# Patient Record
Sex: FEMALE | Race: WHITE | NOT HISPANIC OR LATINO | Employment: UNEMPLOYED | URBAN - METROPOLITAN AREA
[De-identification: names, ages, dates, MRNs, and addresses within clinical notes are randomized per-mention and may not be internally consistent; named-entity substitution may affect disease eponyms.]

---

## 2017-06-27 ENCOUNTER — ALLSCRIPTS OFFICE VISIT (OUTPATIENT)
Dept: OTHER | Facility: OTHER | Age: 32
End: 2017-06-27

## 2017-10-17 ENCOUNTER — TRANSCRIBE ORDERS (OUTPATIENT)
Dept: ADMINISTRATIVE | Facility: HOSPITAL | Age: 32
End: 2017-10-17

## 2017-10-17 ENCOUNTER — ALLSCRIPTS OFFICE VISIT (OUTPATIENT)
Dept: OTHER | Facility: OTHER | Age: 32
End: 2017-10-17

## 2017-10-17 ENCOUNTER — APPOINTMENT (OUTPATIENT)
Dept: LAB | Facility: MEDICAL CENTER | Age: 32
End: 2017-10-17
Payer: COMMERCIAL

## 2017-10-17 DIAGNOSIS — R50.9 FEVER: ICD-10-CM

## 2017-10-17 DIAGNOSIS — F41.9 ANXIETY DISORDER: ICD-10-CM

## 2017-10-17 DIAGNOSIS — R00.2 PALPITATIONS: ICD-10-CM

## 2017-10-17 DIAGNOSIS — R53.83 OTHER FATIGUE: ICD-10-CM

## 2017-10-17 LAB
ALBUMIN SERPL BCP-MCNC: 4.3 G/DL (ref 3.5–5)
ALP SERPL-CCNC: 50 U/L (ref 46–116)
ALT SERPL W P-5'-P-CCNC: 33 U/L (ref 12–78)
ANION GAP SERPL CALCULATED.3IONS-SCNC: 10 MMOL/L (ref 4–13)
AST SERPL W P-5'-P-CCNC: 31 U/L (ref 5–45)
BASOPHILS # BLD AUTO: 0.03 THOUSANDS/ΜL (ref 0–0.1)
BASOPHILS NFR BLD AUTO: 1 % (ref 0–1)
BILIRUB SERPL-MCNC: 0.83 MG/DL (ref 0.2–1)
BILIRUB UR QL STRIP: NEGATIVE
BUN SERPL-MCNC: 13 MG/DL (ref 5–25)
CALCIUM SERPL-MCNC: 9 MG/DL (ref 8.3–10.1)
CHLORIDE SERPL-SCNC: 103 MMOL/L (ref 100–108)
CLARITY UR: CLEAR
CO2 SERPL-SCNC: 26 MMOL/L (ref 21–32)
COLOR UR: YELLOW
CREAT SERPL-MCNC: 0.76 MG/DL (ref 0.6–1.3)
EOSINOPHIL # BLD AUTO: 0.3 THOUSAND/ΜL (ref 0–0.61)
EOSINOPHIL NFR BLD AUTO: 5 % (ref 0–6)
ERYTHROCYTE [DISTWIDTH] IN BLOOD BY AUTOMATED COUNT: 12.5 % (ref 11.6–15.1)
GFR SERPL CREATININE-BSD FRML MDRD: 103 ML/MIN/1.73SQ M
GLUCOSE SERPL-MCNC: 81 MG/DL (ref 65–140)
GLUCOSE UR STRIP-MCNC: NEGATIVE MG/DL
HCT VFR BLD AUTO: 43.7 % (ref 34.8–46.1)
HGB BLD-MCNC: 14.7 G/DL (ref 11.5–15.4)
HGB UR QL STRIP.AUTO: NEGATIVE
KETONES UR STRIP-MCNC: ABNORMAL MG/DL
LEUKOCYTE ESTERASE UR QL STRIP: NEGATIVE
LYMPHOCYTES # BLD AUTO: 2.02 THOUSANDS/ΜL (ref 0.6–4.47)
LYMPHOCYTES NFR BLD AUTO: 35 % (ref 14–44)
MAGNESIUM SERPL-MCNC: 2.3 MG/DL (ref 1.6–2.6)
MCH RBC QN AUTO: 31.2 PG (ref 26.8–34.3)
MCHC RBC AUTO-ENTMCNC: 33.6 G/DL (ref 31.4–37.4)
MCV RBC AUTO: 93 FL (ref 82–98)
MONOCYTES # BLD AUTO: 0.52 THOUSAND/ΜL (ref 0.17–1.22)
MONOCYTES NFR BLD AUTO: 9 % (ref 4–12)
NEUTROPHILS # BLD AUTO: 2.89 THOUSANDS/ΜL (ref 1.85–7.62)
NEUTS SEG NFR BLD AUTO: 50 % (ref 43–75)
NITRITE UR QL STRIP: NEGATIVE
NRBC BLD AUTO-RTO: 0 /100 WBCS
PH UR STRIP.AUTO: 6 [PH] (ref 4.5–8)
PLATELET # BLD AUTO: 185 THOUSANDS/UL (ref 149–390)
PMV BLD AUTO: 10.4 FL (ref 8.9–12.7)
POTASSIUM SERPL-SCNC: 4.3 MMOL/L (ref 3.5–5.3)
PROT SERPL-MCNC: 7.9 G/DL (ref 6.4–8.2)
PROT UR STRIP-MCNC: NEGATIVE MG/DL
RBC # BLD AUTO: 4.71 MILLION/UL (ref 3.81–5.12)
SODIUM SERPL-SCNC: 139 MMOL/L (ref 136–145)
SP GR UR STRIP.AUTO: 1.01 (ref 1–1.03)
TSH SERPL DL<=0.05 MIU/L-ACNC: 2.21 UIU/ML (ref 0.36–3.74)
UROBILINOGEN UR QL STRIP.AUTO: 0.2 E.U./DL
WBC # BLD AUTO: 5.78 THOUSAND/UL (ref 4.31–10.16)

## 2017-10-17 PROCEDURE — 83735 ASSAY OF MAGNESIUM: CPT

## 2017-10-17 PROCEDURE — 36415 COLL VENOUS BLD VENIPUNCTURE: CPT

## 2017-10-17 PROCEDURE — 86618 LYME DISEASE ANTIBODY: CPT

## 2017-10-17 PROCEDURE — 84443 ASSAY THYROID STIM HORMONE: CPT

## 2017-10-17 PROCEDURE — 80053 COMPREHEN METABOLIC PANEL: CPT

## 2017-10-17 PROCEDURE — 81003 URINALYSIS AUTO W/O SCOPE: CPT

## 2017-10-17 PROCEDURE — 85025 COMPLETE CBC W/AUTO DIFF WBC: CPT

## 2017-10-18 ENCOUNTER — TRANSCRIBE ORDERS (OUTPATIENT)
Dept: ADMINISTRATIVE | Facility: HOSPITAL | Age: 32
End: 2017-10-18

## 2017-10-18 DIAGNOSIS — R00.2 PALPITATIONS: Primary | ICD-10-CM

## 2017-10-18 NOTE — PROGRESS NOTES
Assessment  1  Heart palpitations (785 1) (R00 2)   2  Fever (780 60) (R50 9)   3  Fatigue (780 79) (R53 83)   4  Anxiety disorder (300 00) (F41 9)   5  Asthma (493 90) (J45 909)   6  Change in hearing of left ear (389 9) (H91 92)   7  Former smoker (W55 61) (N07 150)    Plan  Anxiety disorder, Fatigue, Fever, Heart palpitations    · (1) CBC/PLT/DIFF; Status:Active; Requested for:17Oct2017;    · (1) COMPREHENSIVE METABOLIC PANEL; Status:Active; Requested for:17Oct2017;    · (1) LYME ANTIBODY PROFILE W/REFLEX TO WESTERN BLOT; Status:Active; Requested for:17Oct2017;    · (1) MAGNESIUM; Status:Active; Requested for:17Oct2017;    · (1) TSH; Status:Active; Requested for:17Oct2017;   Depression with anxiety    · *VB-Depression Screening; Status:Complete - Retrospective By Protocol Authorization;    Done: 73VPD0244 03:40PM  Fever    · (1) URINALYSIS w URINE C/S REFLEX (will reflex a microscopy if leukocytes, occult  blood, or nitrites are not within normal limits); Status:Active; Requested for:17Oct2017;    · (1) URINE CULTURE; Source:Urine, Clean Catch; Status:Active; Requested  IPA:[id number]; Heart palpitations    · ECHO COMPLETE WITH CONTRAST IF INDICATED; Status:Need Information -  Financial Authorization; Requested HTK:[id number];    · EKG/ECG- POC; Status:Complete - Retrospective By Protocol Authorization;   Done:  86DTD7726 04:13PM    Discussion/Summary    1 : Palpitations: On exam today, there was no abnormalities noted  This was a historic issue, the patient did mention that her mother had MVP as well  Based on this, check echo  This may also find vegetations for the fever of unknown origin, so be entirely reasonable to continue on this course of diagnostic imaging  Fever: Patient does have a fever of unknown origin at the moment  Not sure why she is having this  Check Lyme, CBC, CMP, TSH, urinalysis  Follow-up in the future  Fatigue: Again, unsure as to cause  Check labs  Follow up after that   Anxiety: Patient does have a significant amount of anxiety at the moment, but it is likely adjustment reaction rather than true anxiety disorder  For the moment, she seems to be tolerating things relatively well, and did not wish to start medication  I would concur, and we will re-evaluate in the future  Asthma: Patient has intermittent asthma  Currently, she has not needed the Proventil in quite some time  No changes with this  Flu shot would be recommended  This is something that she can schedule in the future, as I would not wish to give her any vaccinations during a workup for fever of unknown origin  Decreased hearing on the left: Patient does not have any abnormalities in the ear canal  Will check tympanogram, but after this consider seeing audiology  Possible side effects of new medications were reviewed with the patient/guardian today  The treatment plan was reviewed with the patient/guardian  The patient/guardian understands and agrees with the treatment plan      Chief Complaint  Feeling run down lately, running low grade fever over past few days  Feels weak and shaky  Under a lot stress due to a recent unexpected death in the family  Started smoking after that but has since quit    Experiencing a lot of anxiety  positive for moderated depression  History of Present Illness  HPI: Fevers, chills, shakes at times  No SOB  No urine problems  No face tooth pain  No rashes  inconsistancies in bowels  Not regular: Loose at times, and frequent at times  There was only 1 day of constipation recently  has had significant stress due to the passing of her brother in law  She has been to a grief counselor  was 6 weeks ago  Review of Systems    Constitutional: No fever, no chills, feels well, no tiredness, no recent weight gain or loss  ENT: no ear ache, no loss of hearing, no nosebleeds or nasal discharge, no sore throat or hoarseness  Cardiovascular: Has had some symptoms in the last 6 weeks  Is arround anxiety   Has had some palpitations as well  Respiratory: no complaints of shortness of breath, no wheezing, no dyspnea on exertion, no orthopnea or PND  Active Problems  1  Allergic rhinitis (477 9) (J30 9)   2  Anxiety disorder (300 00) (F41 9)   3  Asthma (493 90) (J45 909)   4  Need for Tdap vaccination (V06 1) (Z23)    Past Medical History  1  Acute sinusitis (461 9) (J01 90)   2  History of Acute upper respiratory infection (465 9) (J06 9)   3  History of Acute viral pharyngitis (462) (J02 8,B97 89)   4  History of Fever and chills (780 60) (R50 9)   5  History of Flank pain (789 09) (R10 9)   6  History of acute bronchitis (V12 69) (Z87 09)   7  History of acute pharyngitis (V12 69) (Z87 09)   8  History of back pain (V13 59) (Z87 39)   9  History of cough   10  History of eating disorder (V11 8) (Z86 59)   11  History of headache (V13 89) (Z87 898)   12  History of pyelonephritis (V13 02) (Z87 448)   13  History of sinusitis (V12 69) (Z87 09)   14  History of sore throat (V12 60) (Z87 09)   15  History of urinary tract infection (V13 02) (Z87 440)   16  History of Puncture wound of foot (892 0) (L28 115V)  Active Problems And Past Medical History Reviewed: The active problems and past medical history were reviewed and updated today  Family History  Mother    1  Family history of mitral valve prolapse (V17 49) (Z82 49)  Father    2  Family history of Asthma (V17 5)   3  Family history of Hyperlipidemia   4  Family history of Hypertension (V17 49)  Paternal Grandfather    11  Family history of Prostate Cancer (B47 50)  Family History Reviewed: The family history was reviewed and updated today  Social History   · Former smoker (D72 07) (A71 166)   · No secondhand smoke exposure (V49 89) (Z78 9)  The social history was reviewed and updated today  The social history was reviewed and is unchanged  Surgical History  Surgical History Reviewed: The surgical history was reviewed and updated today  Current Meds   1  Azelastine HCl - 0 1 % Nasal Solution; INSTILL 2 SPRAYS IN EACH NOSTRIL ONCE   DAILY; Therapy: 69XPO2846 to (Last Rx:27Jun2017) Ordered   2  PriLOSEC OTC 20 MG Oral Tablet Delayed Release; Therapy: 59LHD4539 to Recorded   3  ProAir  (90 Base) MCG/ACT Inhalation Aerosol Solution; Therapy: (Recorded:17Oct2017) to Recorded   4  Sprintec 28 0 25-35 MG-MCG Oral Tablet; Therapy: 02XWB9686 to Recorded    The medication list was reviewed and updated today  Allergies  1  Percocet TABS    Vitals   Recorded: 19CDO2859 03:31PM   Temperature 99 3 F, Tympanic   Heart Rate 80, L Radial   Pulse Quality Regular, L Radial   Systolic 999, LUE, Sitting   Diastolic 90, LUE, Sitting   Height 5 ft 8 in   Weight 135 lb 3 2 oz   BMI Calculated 20 56   BSA Calculated 1 73     Physical Exam    Constitutional   General appearance: No acute distress, well appearing and well nourished  Pulmonary   Respiratory effort: No increased work of breathing or signs of respiratory distress  Auscultation of lungs: Clear to auscultation  Cardiovascular   Auscultation of heart: Normal rate and rhythm, normal S1 and S2, without murmurs  Carotid pulses: Normal     Abdomen   Abdomen: Non-tender, no masses  Liver and spleen: No hepatomegaly or splenomegaly  Musculoskeletal   Gait and station: Normal     Digits and nails: Normal without clubbing or cyanosis  Inspection/palpation of joints, bones, and muscles: Normal     Neurologic   Cranial nerves: Cranial nerves 2-12 intact  Reflexes: 2+ and symmetric  Sensation: No sensory loss      Psychiatric   Orientation to person, place, and time: Normal     Mood and affect: Normal          Results/Data  EKG/ECG- POC 91UPY3314 04:13PM [facility]     Test Name Result Flag Reference   EKG/ECG see scanned report       *VB-Depression Screening 90GAW6392 03:40PM [facility]     Test Name Result Flag Reference   Depression Scale Result      Depression Screen - Positive Findings     PHQ-9 Adult Depression Screening 24KEP0685 03:39PM User, Mike     Test Name Result Flag Reference   PHQ-9 Adult Depression Score 13     Over the last two weeks, how often have you been bothered by any of the following problems? Little interest or pleasure in doing things: More than half the days - 2  Feeling down, depressed, or hopeless: More than half the days - 2  Trouble falling or staying asleep, or sleeping too much: More than half the days - 2  Feeling tired or having little energy: Nearly every day - 3  Poor appetite or over eating: Nearly every day - 3  Feeling bad about yourself - or that you are a failure or have let yourself or your family down: Not at all - 0  Trouble concentrating on things, such as reading the newspaper or watching television: Several days - 1  Moving or speaking so slowly that other people could have noticed  Or the opposite -  being so fidgety or restless that you have been moving around a lot more than usual: Not at all - 0  Thoughts that you would be better off dead, or of hurting yourself in some way: Not at all - 0   PHQ-9 Adult Depression Screening Positive     PHQ-9 Difficulty Level Somewhat difficult     PHQ-9 Severity Moderate Depression         Signatures   Electronically signed by :  TAB Gill ; Oct 17 2017  4:28PM EST                       (Author)

## 2017-10-19 ENCOUNTER — GENERIC CONVERSION - ENCOUNTER (OUTPATIENT)
Dept: OTHER | Facility: OTHER | Age: 32
End: 2017-10-19

## 2017-10-19 LAB
B BURGDOR IGG SER IA-ACNC: 0.11
B BURGDOR IGM SER IA-ACNC: 0.21

## 2017-10-25 ENCOUNTER — HOSPITAL ENCOUNTER (OUTPATIENT)
Dept: NON INVASIVE DIAGNOSTICS | Facility: CLINIC | Age: 32
Discharge: HOME | End: 2017-10-25
Payer: COMMERCIAL

## 2017-10-25 ENCOUNTER — GENERIC CONVERSION - ENCOUNTER (OUTPATIENT)
Dept: OTHER | Facility: OTHER | Age: 32
End: 2017-10-25

## 2017-10-25 DIAGNOSIS — R00.2 PALPITATIONS: ICD-10-CM

## 2017-10-25 PROCEDURE — 93306 TTE W/DOPPLER COMPLETE: CPT

## 2017-10-30 ENCOUNTER — GENERIC CONVERSION - ENCOUNTER (OUTPATIENT)
Dept: OTHER | Facility: OTHER | Age: 32
End: 2017-10-30

## 2017-10-30 ENCOUNTER — GENERIC CONVERSION - ENCOUNTER (OUTPATIENT)
Dept: FAMILY MEDICINE CLINIC | Facility: CLINIC | Age: 32
End: 2017-10-30

## 2017-12-11 ENCOUNTER — GENERIC CONVERSION - ENCOUNTER (OUTPATIENT)
Dept: OTHER | Facility: OTHER | Age: 32
End: 2017-12-11

## 2018-01-08 DIAGNOSIS — M54.50 LOW BACK PAIN: ICD-10-CM

## 2018-01-09 NOTE — RESULT NOTES
Verified Results  (1) CBC/PLT/DIFF 72FRK2486 05:02PM Amos Gonzalez Order Number: [id number]     Test Name Result Flag Reference   WBC COUNT 5 78 Thousand/uL  4 31-10 16   RBC COUNT 4 71 Million/uL  3 81-5 12   HEMOGLOBIN 14 7 g/dL  11 5-15 4   HEMATOCRIT 43 7 %  34 8-46  1   MCV 93 fL  82-98   MCH 31 2 pg  26 8-34 3   MCHC 33 6 g/dL  31 4-37 4   RDW 12 5 %  11 6-15 1   MPV 10 4 fL  8 9-12 7   PLATELET COUNT 896 Thousands/uL  149-390   nRBC AUTOMATED 0 /100 WBCs     NEUTROPHILS RELATIVE PERCENT 50 %  43-75   LYMPHOCYTES RELATIVE PERCENT 35 %  14-44   MONOCYTES RELATIVE PERCENT 9 %  4-12   EOSINOPHILS RELATIVE PERCENT 5 %  0-6   BASOPHILS RELATIVE PERCENT 1 %  0-1   NEUTROPHILS ABSOLUTE COUNT 2 89 Thousands/? ??L  1 85-7 62   LYMPHOCYTES ABSOLUTE COUNT 2 02 Thousands/? ??L  0 60-4 47   MONOCYTES ABSOLUTE COUNT 0 52 Thousand/? ??L  0 17-1 22   EOSINOPHILS ABSOLUTE COUNT 0 30 Thousand/? ??L  0 00-0 61   BASOPHILS ABSOLUTE COUNT 0 03 Thousands/? ??L  0 00-0 10     (1) COMPREHENSIVE METABOLIC PANEL 02BXK9593 67:46TP Tori Hunt    Order Number: [id number]     Test Name Result Flag Reference   GLUCOSE,RANDM 81 mg/dL     If the patient is fasting, the ADA then defines impaired fasting glucose as > 100 mg/dL and diabetes as > or equal to 123 mg/dL  Specimen collection should occur prior to Sulfasalazine administration due to the potential for falsely depressed results  Specimen collection should occur prior to Sulfapyridine administration due to the potential for falsely elevated results     SODIUM 139 mmol/L  136-145   POTASSIUM 4 3 mmol/L  3 5-5 3   CHLORIDE 103 mmol/L  100-108   CARBON DIOXIDE 26 mmol/L  21-32   ANION GAP (CALC) 10 mmol/L  4-13   BLOOD UREA NITROGEN 13 mg/dL  5-25   CREATININE 0 76 mg/dL  0 60-1 30   Standardized to IDMS reference method   CALCIUM 9 0 mg/dL  8 3-10 1   BILI, TOTAL 0 83 mg/dL  0 20-1 00   ALK PHOSPHATAS 50 U/L     ALT (SGPT) 33 U/L  12-78   Specimen collection should occur prior to Sulfasalazine and/or Sulfapyridine administration due to the potential for falsely depressed results  AST(SGOT) 31 U/L  5-45   Specimen collection should occur prior to Sulfasalazine administration due to the potential for falsely depressed results  ALBUMIN 4 3 g/dL  3 5-5 0   TOTAL PROTEIN 7 9 g/dL  6 4-8 2   eGFR 103 ml/min/1 73sq m     [facility] Disease Education Program recommendations are as follows:  GFR calculation is accurate only with a steady state creatinine  Chronic Kidney disease less than 60 ml/min/1 73 sq  meters  Kidney failure less than 15 ml/min/1 73 sq  meters  (1) TSH 17Oct2017 05:02PM Davina Led   TW Order Number: [id number]     Test Name Result Flag Reference   TSH 2 210 uIU/mL  0 358-3 740   Patients undergoing fluorescein dye angiography may retain small amounts of fluorescein in the body for 48-72 hours post procedure  Samples containing fluorescein can produce falsely depressed TSH values  If the patient had this procedure,a specimen should be resubmitted post fluorescein clearance            The recommended reference ranges for TSH during pregnancy are as follows:  First trimester 0 1 to 2 5 uIU/mL  Second trimester  0 2 to 3 0 uIU/mL  Third trimester 0 3 to 3 0 uIU/m     (1) MAGNESIUM 17Oct2017 05:02PM Davina Led   TW Order Number: [id number]     Test Name Result Flag Reference   MAGNESIUM 2 3 mg/dL  1 6-2 6     (1) URINALYSIS w URINE C/S REFLEX (will reflex a microscopy if leukocytes, occult blood, or nitrites are not within normal limits) 53FXC1071 05:02PM Cosme Semen Order Number: [id number]     Test Name Result Flag Reference   COLOR Yellow     CLARITY Clear     PH UA 6 0  4 5-8 0   LEUKOCYTE ESTERASE UA Negative  Negative   NITRITE UA Negative  Negative   PROTEIN UA Negative mg/dl  Negative   GLUCOSE UA Negative mg/dl  Negative   KETONES UA Trace mg/dl A Negative   UROBILINOGEN UA 0 2 E U /dl 0  2, 1 0 E U /dl   BILIRUBIN UA Negative  Negative   BLOOD UA Negative  Negative   SPECIFIC GRAVITY UA 1 008  1 003-1 030       Plan  Anxiety disorder, Fatigue, Fever, Heart palpitations    · (1) CBC/PLT/DIFF; Status:Complete;   Done: 34MGO5767 05:02PM   · (1) COMPREHENSIVE METABOLIC PANEL; Status:Complete;   Done: 29SJQ1117  05:02PM   · (1) LYME ANTIBODY PROFILE W/REFLEX TO WESTERN BLOT; Status:Active; Requested for:17Oct2017;    · (1) MAGNESIUM; Status:Complete;   Done: 22ORL7288 05:02PM   · (1) TSH; Status:Complete;   Done: 91VOD8513 05:02PM  Depression with anxiety    · *VB-Depression Screening; Status:Complete;   Done: 88FGR7416 03:40PM  Fever    · (1) URINALYSIS w URINE C/S REFLEX (will reflex a microscopy if leukocytes, occult  blood, or nitrites are not within normal limits); Status:Complete;   Done: 85RBL6809  05:02PM   · (1) URINE CULTURE; Source:Urine, Clean Catch; Status:Active; Requested  SFJ:[id number]; Heart palpitations    · ECHO COMPLETE WITH CONTRAST IF INDICATED; Status:Need Information -  Financial Authorization;  Requested OEM:[id number];    · EKG/ECG- POC; Status:Complete;   Done: 66FFP0928 04:13PM

## 2018-01-13 VITALS
DIASTOLIC BLOOD PRESSURE: 90 MMHG | TEMPERATURE: 99.3 F | BODY MASS INDEX: 20.49 KG/M2 | HEART RATE: 80 BPM | HEIGHT: 68 IN | SYSTOLIC BLOOD PRESSURE: 136 MMHG | WEIGHT: 135.2 LBS

## 2018-01-13 VITALS
HEART RATE: 72 BPM | SYSTOLIC BLOOD PRESSURE: 108 MMHG | BODY MASS INDEX: 21.28 KG/M2 | DIASTOLIC BLOOD PRESSURE: 60 MMHG | WEIGHT: 140.38 LBS | HEIGHT: 68 IN

## 2018-01-13 NOTE — MISCELLANEOUS
Message  Pt called c/o sore throat and loss of voice  She was told to follow up in 5 days if not better  She states her fever has resolved and her SOB is no longer a issue  Per Brett Spangler and DM note pt may have Z-Eddie and start OTC Flonase as well  Pt agrees and RX sent to [phone]  kw      Active Problems    1  Acute viral pharyngitis (462) (J02 8,B97 89)   2  Allergic rhinitis (477 9) (J30 9)   3  Anxiety disorder (300 00) (F41 9)   4  Asthma (493 90) (J45 909)   5  Back pain (724 5) (M54 9)   6  Cough (786 2) (R05)   7  Depression with anxiety (300 4) (F41 8)   8  Headache (784 0) (R51)   9  Personality disorder (301 9) (F60 9)   10  Sore throat (462) (J02 9)    Current Meds   1  Benzonatate 200 MG Oral Capsule; Take one three times a day as needed for cough; Therapy: 20KUI7510 to (Evaluate:21Mar2016)  Requested for: 66TNZ2834; Last   Rx:01Mar2016 Ordered   2  PriLOSEC OTC 20 MG Oral Tablet Delayed Release; Therapy: 60KTX5603 to Recorded   3  SLPG Magic Mouthwash 1:1:1 maalox/diphenhydramine/lidocaine; 5 ml gargle q 6-8 hr   prn Sore throat; Therapy: 21UMD7655 to (Last Rx:01Mar2016) Ordered    Allergies    1   No Known Drug Allergies    Signatures   Electronically signed by : Samuel Catalan, ; Mar 10 2016  8:29AM EST                       (Author)

## 2018-01-15 ENCOUNTER — APPOINTMENT (OUTPATIENT)
Dept: PHYSICAL THERAPY | Facility: CLINIC | Age: 33
End: 2018-01-15
Payer: COMMERCIAL

## 2018-01-15 NOTE — RESULT NOTES
Verified Results  (1) LYME ANTIBODY PROFILE [facility] TO WESTERN BLOT 90JOM9123 05:02PM Abrahan Palm Order Number: [id number]     Test Name Result Flag Reference   LYME IGG 0 11  0 00-0 79   NEGATIVE(0 00-0 79)-Absence of detectable Borrelia IgG Antibodies  A negative result does not exclude the possibility of Borrelia infection  If early Lyme disease is suspected,a second sample should be collected & tested 4 weeks after initial testing  LYME IGM 0 21  0 00-0 79   NEGATIVE (0 00-0 79)-Absence of detectable Borrelia IgM antibodies  A negative result does not exclude the possibility of Borrelia infection  If early lyme disease is suspected, a second sample should be collected & tested 4 weeks after initial testing

## 2018-01-16 ENCOUNTER — GENERIC CONVERSION - ENCOUNTER (OUTPATIENT)
Dept: OTHER | Facility: OTHER | Age: 33
End: 2018-01-16

## 2018-01-16 ENCOUNTER — APPOINTMENT (OUTPATIENT)
Dept: PHYSICAL THERAPY | Facility: CLINIC | Age: 33
End: 2018-01-16
Payer: COMMERCIAL

## 2018-01-16 PROCEDURE — G8991 OTHER PT/OT GOAL STATUS: HCPCS

## 2018-01-16 PROCEDURE — G8990 OTHER PT/OT CURRENT STATUS: HCPCS

## 2018-01-16 PROCEDURE — 97140 MANUAL THERAPY 1/> REGIONS: CPT

## 2018-01-16 PROCEDURE — 97161 PT EVAL LOW COMPLEX 20 MIN: CPT

## 2018-01-17 NOTE — RESULT NOTES
Verified Results  ECHO COMPLETE WITH CONTRAST IF INDICATED 2017 06:51AM Vinod Crandall     Test Name Result Flag Reference   ECHO COMPLETE WITH CONTRAST IF INDICATED (Report)     [address]   [address], [address] [facility]   [phone]     Transthoracic Echocardiogram   2D, M-mode, Doppler, and Color Doppler     Study date: 25-Oct-2017     Patient: Chuy Giron   MR number: [id number]   Account number: [de-identified]   : 1985   Age: 27 years   Gender: Female   Status: Outpatient   Location: [address] [place] [address] and [facility]   Height: 68 in   Weight: 135 lb   BP: 136/ 90 mmHg     Indications: Palpitations     Diagnoses: R00 2 - Palpitations     Sonographer: CHELE Morales   Referring Physician: Dayana Crow MD   Group: [facility]   Interpreting Physician: Remington Hicks MD     SUMMARY     LEFT VENTRICLE:   Size was normal    Systolic function was normal  Ejection fraction was estimated to be 60 %  There were no regional wall motion abnormalities  Wall thickness was normal    Left ventricular diastolic function parameters were normal      RIGHT VENTRICLE:   The size was normal    Systolic function was normal      TRICUSPID VALVE:   There was mild regurgitation  IVC, HEPATIC VEINS:   The inferior vena cava was mildly dilated  The respirophasic change in diameter was more than 50%  HISTORY: PRIOR HISTORY: Asthma, former smoker     PROCEDURE: The study was performed in the [address]  This was a routine study  The transthoracic approach was used  The study included complete 2D imaging, M-mode, complete spectral Doppler, and color Doppler  Image   quality was adequate  LEFT VENTRICLE: Size was normal  Systolic function was normal  Ejection fraction was estimated to be 60 %  There were no regional wall motion abnormalities   Wall thickness was normal  DOPPLER: Left ventricular diastolic function parameters   were normal      RIGHT VENTRICLE: The size was normal  Systolic function was normal  Wall thickness was normal      LEFT ATRIUM: Size was normal      RIGHT ATRIUM: Size was normal      MITRAL VALVE: Valve structure was normal  There was normal leaflet separation  DOPPLER: The transmitral velocity was within the normal range  There was no evidence for stenosis  There was no significant regurgitation  AORTIC VALVE: The valve was trileaflet  Leaflets exhibited normal thickness and normal cuspal separation  DOPPLER: Transaortic velocity was within the normal range  There was no evidence for stenosis  There was no significant   regurgitation  TRICUSPID VALVE: The valve structure was normal  There was normal leaflet separation  DOPPLER: The transtricuspid velocity was within the normal range  There was no evidence for stenosis  There was mild regurgitation  Pulmonary artery   systolic pressure was within the normal range  Estimated peak PA pressure was 29 mmHg  PULMONIC VALVE: Leaflets exhibited normal thickness, no calcification, and normal cuspal separation  DOPPLER: The transpulmonic velocity was within the normal range  There was no significant regurgitation  PERICARDIUM: There was no pericardial effusion  The pericardium was normal in appearance  AORTA: The root exhibited normal size  SYSTEMIC VEINS: IVC: The inferior vena cava was mildly dilated  The respirophasic change in diameter was more than 50%       SYSTEM MEASUREMENT TABLES     2D   %FS: 30 07 %   AV Diam: 3 51 cm   EDV(Teich): 97 81 ml   EF(Cube): 65 8 %   EF(Teich): 57 37 %   ESV(Cube): 33 49 ml   ESV(Teich): 41 69 ml   IVSd: 0 74 cm   LA Area: 13 19 cm2   LA Diam: 3 14 cm   LVEDV MOD A4C: 113 3 ml   LVEF MOD A4C: 60 19 %   LVESV MOD A4C: 45 11 ml   LVIDd: 4 61 cm   LVIDs: 3 22 cm   LVLd A4C: 8 21 cm   LVLs A4C: 6 68 cm   LVPWd: 0 83 cm   RA Area: 14 17 cm2   RV Diam : 3 52 cm   SV MOD A4C: 68 19 ml   SV(Cube): 64 45 ml   SV(Teich): 56 12 ml     CW   TR Vmax: 2 33 m/s   TR maxP 71 mmHg     MM   TAPSE: 2 39 cm     PW   E': 0 11 m/s   E/E': 7 08   MV A [place]: 0 48 m/s   MV Dec [place]: 3 85 m/s2   MV DecT: 194 54 ms   MV E Los: 0 75 m/s   MV E/A Ratio: 1 55     [employer] Accredited Echocardiography Laboratory     Prepared and electronically signed by     Remington Hicks MD   Signed 25-Oct-2017 09:01:09

## 2018-01-22 ENCOUNTER — APPOINTMENT (OUTPATIENT)
Dept: PHYSICAL THERAPY | Facility: CLINIC | Age: 33
End: 2018-01-22
Payer: COMMERCIAL

## 2018-01-22 VITALS
DIASTOLIC BLOOD PRESSURE: 92 MMHG | BODY MASS INDEX: 20.52 KG/M2 | HEIGHT: 68 IN | SYSTOLIC BLOOD PRESSURE: 142 MMHG | HEART RATE: 80 BPM | WEIGHT: 135.4 LBS

## 2018-01-22 VITALS — DIASTOLIC BLOOD PRESSURE: 78 MMHG | SYSTOLIC BLOOD PRESSURE: 112 MMHG

## 2018-01-22 PROCEDURE — 97110 THERAPEUTIC EXERCISES: CPT

## 2018-01-22 PROCEDURE — 97140 MANUAL THERAPY 1/> REGIONS: CPT

## 2018-01-23 ENCOUNTER — APPOINTMENT (OUTPATIENT)
Dept: PHYSICAL THERAPY | Facility: CLINIC | Age: 33
End: 2018-01-23
Payer: COMMERCIAL

## 2018-01-23 PROCEDURE — 97140 MANUAL THERAPY 1/> REGIONS: CPT

## 2018-01-23 PROCEDURE — 97110 THERAPEUTIC EXERCISES: CPT

## 2018-01-23 PROCEDURE — 97112 NEUROMUSCULAR REEDUCATION: CPT

## 2018-01-24 VITALS
DIASTOLIC BLOOD PRESSURE: 80 MMHG | SYSTOLIC BLOOD PRESSURE: 132 MMHG | TEMPERATURE: 99.1 F | HEIGHT: 68 IN | HEART RATE: 68 BPM | WEIGHT: 143 LBS | OXYGEN SATURATION: 98 % | BODY MASS INDEX: 21.67 KG/M2

## 2018-01-24 VITALS — DIASTOLIC BLOOD PRESSURE: 70 MMHG | SYSTOLIC BLOOD PRESSURE: 100 MMHG

## 2018-01-29 ENCOUNTER — OFFICE VISIT (OUTPATIENT)
Dept: PHYSICAL THERAPY | Facility: CLINIC | Age: 33
End: 2018-01-29
Payer: COMMERCIAL

## 2018-01-29 DIAGNOSIS — M54.40 MIDLINE LOW BACK PAIN WITH SCIATICA, SCIATICA LATERALITY UNSPECIFIED, UNSPECIFIED CHRONICITY: Primary | ICD-10-CM

## 2018-01-29 PROCEDURE — 97110 THERAPEUTIC EXERCISES: CPT | Performed by: PHYSICAL MEDICINE & REHABILITATION

## 2018-01-29 PROCEDURE — 97112 NEUROMUSCULAR REEDUCATION: CPT | Performed by: PHYSICAL MEDICINE & REHABILITATION

## 2018-01-29 PROCEDURE — 97140 MANUAL THERAPY 1/> REGIONS: CPT | Performed by: PHYSICAL MEDICINE & REHABILITATION

## 2018-01-29 NOTE — PROGRESS NOTES
Daily Note     Today's date: 2018  Patient name: Berta Hackett  : 1985  MRN: [id number]  Referring provider: Melissa Ibarra MD  Dx:   Encounter Diagnosis   Name Primary?  Midline low back pain with sciatica, sciatica laterality unspecified, unspecified chronicity Yes       Start Time: 1100  Stop Time: 1200  Total time in clinic (min): 60 minutes    Subjective: Pt reports slight discomfort in the lumbosacral region  She reports increased activity at work over the weekend  Objective: See treatment diary below  Precautions: none    Daily Treatment Diary     Manual              Lumbosacral mob progressive oscillations Gr III 4 min            Lumbar rotational mob Gr V 4 min            Side lying QL stretch over bolster 2 min                                          Exercise Diary              Bike 8 min            Prayer stretch  R-C-L 3 x 30"            Cat/Cow  10 ea 5" hold            TB multifidi rotation  Green TB 10 ea, 3" hold            Multifidi walkouts Green Tb, 5" hold, x 5 ea            SLR ABD/Flex c TA contraction 15 ea            Bridges c TA contraction, hip ADD X 15, 3 sec hold                                                                                                                                                                                         Modalities                                                               Assessment: Tolerated treatment well  Patient exhibited good technqiue with therapeutic exercises and could benefit from continued PT  Reduction in Sx post tx, improved quality and quantity of movement  NM control improved when performing spinal stabilization exercise in the mirror for visual biofeedback  Plan: Continue per plan of care  and Progress treatment as tolerated

## 2018-01-30 ENCOUNTER — APPOINTMENT (OUTPATIENT)
Dept: PHYSICAL THERAPY | Facility: CLINIC | Age: 33
End: 2018-01-30
Payer: COMMERCIAL

## 2018-01-30 ENCOUNTER — OFFICE VISIT (OUTPATIENT)
Dept: PHYSICAL THERAPY | Facility: CLINIC | Age: 33
End: 2018-01-30
Payer: COMMERCIAL

## 2018-01-30 DIAGNOSIS — M54.40 MIDLINE LOW BACK PAIN WITH SCIATICA, SCIATICA LATERALITY UNSPECIFIED, UNSPECIFIED CHRONICITY: Primary | ICD-10-CM

## 2018-01-30 PROCEDURE — 97110 THERAPEUTIC EXERCISES: CPT | Performed by: PHYSICAL MEDICINE & REHABILITATION

## 2018-01-30 PROCEDURE — 97112 NEUROMUSCULAR REEDUCATION: CPT | Performed by: PHYSICAL MEDICINE & REHABILITATION

## 2018-01-30 PROCEDURE — 97140 MANUAL THERAPY 1/> REGIONS: CPT | Performed by: PHYSICAL MEDICINE & REHABILITATION

## 2018-01-30 NOTE — PROGRESS NOTES
Daily Note     Today's date: 2018  Patient name: Nataliya Obando  : 1985  MRN: [id number]  Referring provider: Silvina Mg MD  Dx:   Encounter Diagnosis   Name Primary?  Midline low back pain with sciatica, sciatica laterality unspecified, unspecified chronicity Yes       Start Time: 07  Stop Time: 0800  Total time in clinic (min): 35 minutes    Subjective: The pt reports muscular soreness in B hip flexors, however overall she is feeling good  0/10 LBP         Objective: See treatment diary below  Daily Treatment Diary      Manual                     Lumbosacral mob progressive oscillations Gr III 4 min                     Lumbar rotational mob Gr V 4 min 3 min                   Side lying QL stretch over bolster 2 min                      T/S P/A Gr IV  T4/5-T8/9    5min                                                 Exercise Diary                     Bike 8 min  8 min                   Prayer stretch  R-C-L 3 x 30"  3x 30"                   Cat/Cow  10 ea 5" hold  5 ea  5" hold                   TB multifidi rotation  Green TB 10 ea, 3" hold  blueTB 10 ea, 3" hold                   Multifidi walkouts Green Tb, 5" hold, x 5 ea  green Tb, 5" hold x 5 ea                   SLR ABD/Flex c TA contraction 15 ea  NV                   Bridges c TA contraction, hip ADD X 15, 3 sec hold  x 15, 3 sec hold                   Seated QL stretch   3 x 30"                                                                                                                                                                                                                                                                                                                         Modalities                                                                                                   Hypomobility noted in the T/S, specifically T4/5-T8/9, significant improvement in quality of movement noted post manual  No billing from 8:00-8:15 due to overlap  Assessment: Tolerated treatment well  Patient exhibited good technqiue with therapeutic exercises and could benefit from continued PT      Plan: Continue per plan of care  and continue T/S mobs

## 2018-02-05 ENCOUNTER — OFFICE VISIT (OUTPATIENT)
Dept: PHYSICAL THERAPY | Facility: CLINIC | Age: 33
End: 2018-02-05
Payer: COMMERCIAL

## 2018-02-05 DIAGNOSIS — M54.40 MIDLINE LOW BACK PAIN WITH SCIATICA, SCIATICA LATERALITY UNSPECIFIED, UNSPECIFIED CHRONICITY: Primary | ICD-10-CM

## 2018-02-05 PROCEDURE — 97110 THERAPEUTIC EXERCISES: CPT

## 2018-02-05 PROCEDURE — 97112 NEUROMUSCULAR REEDUCATION: CPT

## 2018-02-05 NOTE — PROGRESS NOTES
Daily Note     Today's date: 2018  Patient name: Farrah Whitaker  : 1985  MRN: [id number]  Referring provider: Carmen Barbour MD  Dx:   Encounter Diagnosis   Name Primary?  Midline low back pain with sciatica, sciatica laterality unspecified, unspecified chronicity Yes                  Subjective: Pt  C/o no pain pre and post treatment  Objective: See treatment diary below    Daily Treatment Diary      Manual    2/5                 Lumbosacral mob progressive oscillations Gr III 4 min                     Lumbar rotational mob Gr V 4 min 3 min Not needed                 Side lying QL stretch over bolster 2 min                      T/S P/A Gr IV  T4/5-T8/9    5min not needed                                               Exercise Diary    2/5                 Bike 8 min  8 min  8 min                 Prayer stretch  R-C-L 3 x 30"  3x 30"  3x 30"                 Cat/Cow  10 ea 5" hold  5 ea  5" hold   10ea  5" hold                 TB multifidi rotation  Green TB 10 ea, 3" hold  blueTB 10 ea, 3" hold  blueTB 10 ea, 3" hold                 Multifidi walkouts Green Tb, 5" hold, x 5 ea  green Tb, 5" hold x 5 ea  green Tb, 5" hold x 5 ea                 SLR ABD/Flex c TA contraction 15 ea  NV  15ea                 Bridges c TA contraction, hip ADD X 15, 3 sec hold  x 15, 3 sec hold   x 15, 5 sec hold                 Seated QL stretch   3 x 30"  3 x 30"                                                                                                                                                                                                                                                                                                                       Modalities                                                                                                         Assessment: Tolerated treatment well   Patient stated no back pain  for past week,  PT NAGI  assessed patient  no need for mobs today  Patient reported that she is feeling better and  that she would be able to continue with exercises at home  Consider discussing with patient NV about possible discharge and goals  Plan: Progress treatment as tolerated

## 2018-02-06 ENCOUNTER — EVALUATION (OUTPATIENT)
Dept: PHYSICAL THERAPY | Facility: CLINIC | Age: 33
End: 2018-02-06
Payer: COMMERCIAL

## 2018-02-06 DIAGNOSIS — M54.40 MIDLINE LOW BACK PAIN WITH SCIATICA, SCIATICA LATERALITY UNSPECIFIED, UNSPECIFIED CHRONICITY: Primary | ICD-10-CM

## 2018-02-06 PROCEDURE — G8990 OTHER PT/OT CURRENT STATUS: HCPCS | Performed by: PHYSICAL MEDICINE & REHABILITATION

## 2018-02-06 PROCEDURE — 97110 THERAPEUTIC EXERCISES: CPT | Performed by: PHYSICAL MEDICINE & REHABILITATION

## 2018-02-06 PROCEDURE — G8991 OTHER PT/OT GOAL STATUS: HCPCS | Performed by: PHYSICAL MEDICINE & REHABILITATION

## 2018-02-06 PROCEDURE — G8992 OTHER PT/OT  D/C STATUS: HCPCS | Performed by: PHYSICAL MEDICINE & REHABILITATION

## 2018-02-06 RX ORDER — METOPROLOL SUCCINATE AND HYDROCHLOROTHIAZIDE 12.5; 1 MG/1; MG/1
TABLET ORAL
COMMUNITY
End: 2018-02-27

## 2018-02-06 NOTE — PROGRESS NOTES
PT Re-Evaluation  and PT Discharge    Today's date: 2018  Patient name: Matilda Rowell  : 1985  MRN: [id number]  Referring provider: Darlyn Medina MD  Dx:   Encounter Diagnosis   Name Primary?  Midline low back pain with sciatica, sciatica laterality unspecified, unspecified chronicity Yes       Start Time: 1030  Stop Time: 1115  Total time in clinic (min): 45 minutes    Assessment    Assessment details: Angelo Carvajal has been attending outpatient physical therapy with Midline low back pain with sciatica, sciatica laterality unspecified, unspecified chronicity  Since the last assessment, patient demonstrates decreased pain levels, improved range of motion, improved strength, increased perception of functional ability, and increased tolerance to activity  Understanding of Dx/Px/POC: excellent  Goals  ST  Patient will report 25% decrease in pain in 4 weeks  2  Patient will demonstrate 25% improvement in ROM in 4 weeks  3  Patient will demonstrate 1/2 grade improvement in strength in 4 weeks  LT  Patient will be able to perform IADLS without restriction or pain by discharge  2  Patient will be independent in HEP by discharge  3  Patient will be able to return to recreational/work duties without restriction or pain by discharge        Plan  Patient would benefit from: skilled PT  Planned modality interventions: biofeedback, cryotherapy, TENS and thermotherapy: hydrocollator packs  Planned therapy interventions: balance, coordination, gait training, home exercise program, therapeutic training, therapeutic exercise, therapeutic activities, stretching, strengthening, patient education, neuromuscular re-education, manual therapy and joint mobilization  Treatment plan discussed with: patient  Plan details: Pt has reached maximal benefit of skilled physical therapy and will be discharged at this time to home exercise program         Subjective    Objective     Active Range of Motion Lumbar   Flexion: 78 degrees   Extension: 30 degrees   Left lateral flexion: 35 degrees   Right lateral flexion: 35 degrees     Additional Active Range of Motion Details  (bubble inclinometer at L1 spinous process)    Joint Play Hypomobile: T5, T6, T7, T8 and T9     Strength/Myotome Testing     Left Hip   Planes of Motion   Flexion: 4+  Abduction: 4+    Right Hip   Planes of Motion   Flexion: 4+  Abduction: 4+    Tests     Lumbar   Negative prone instability   Left   Negative passive SLR  Right   Negative passive SLR       Left Pelvic Girdle/Sacrum   Negative: sacral spring and thigh thrust      Additional Tests Details  SLR: 80 degrees B      Precautions: none   Daily Treatment Diary      Manual  1/29 1/30  2/5                 Lumbosacral mob progressive oscillations Gr III 4 min                     Lumbar rotational mob Gr V 4 min 3 min Not needed                 Side lying QL stretch over bolster 2 min                      T/S P/A Gr IV  T4/5-T8/9    5min not needed                                               Exercise Diary  1/29 1/30  2/5                 Bike 8 min  8 min  8 min                 Prayer stretch  R-C-L 3 x 30"  3x 30"  3x 30"                 Cat/Cow  10 ea 5" hold  5 ea  5" hold   10ea  5" hold                 TB multifidi rotation  Green TB 10 ea, 3" hold  blueTB 10 ea, 3" hold  blueTB 10 ea, 3" hold                 Multifidi walkouts Green Tb, 5" hold, x 5 ea  green Tb, 5" hold x 5 ea  green Tb, 5" hold x 5 ea                 SLR ABD/Flex c TA contraction 15 ea  NV  15ea                 Bridges c TA contraction, hip ADD X 15, 3 sec hold  x 15, 3 sec hold   x 15, 5 sec hold                 Seated QL stretch   3 x 30"  3 x 30"                                                                                                                                                                                                                                                                                                                       Modalities                                                                                                                  Flowsheet Rows    Flowsheet Row Most Recent Value   PT/OT G-Codes   Current Score  94   Projected Score  77   FOTO information reviewed  Yes   Assessment Type  Evaluation & Discharge same visit   G code set  Other PT/OT Primary   Other PT Primary Current Status ()  CJ   Other PT Primary Goal Status ()  CI

## 2018-02-12 ENCOUNTER — APPOINTMENT (OUTPATIENT)
Dept: PHYSICAL THERAPY | Facility: CLINIC | Age: 33
End: 2018-02-12
Payer: COMMERCIAL

## 2018-02-12 ENCOUNTER — TELEPHONE (OUTPATIENT)
Dept: FAMILY MEDICINE CLINIC | Facility: CLINIC | Age: 33
End: 2018-02-12

## 2018-02-12 NOTE — TELEPHONE ENCOUNTER
Pt is on Beta blocker, and does not believe is it that effect anymore  Thinks she may need to bump up dosage  She wanted to speak with a nurse  Please call Pt   Thank you

## 2018-02-12 NOTE — TELEPHONE ENCOUNTER
Spoke with pt  Pt  Was advised that we can not change this medication without an Ov  Pt  Was advised to bring her B/P readings along to OV to review   OV scheduled for 2/21/18

## 2018-02-13 ENCOUNTER — APPOINTMENT (OUTPATIENT)
Dept: PHYSICAL THERAPY | Facility: CLINIC | Age: 33
End: 2018-02-13
Payer: COMMERCIAL

## 2018-02-27 ENCOUNTER — OFFICE VISIT (OUTPATIENT)
Dept: FAMILY MEDICINE CLINIC | Facility: CLINIC | Age: 33
End: 2018-02-27
Payer: COMMERCIAL

## 2018-02-27 VITALS
BODY MASS INDEX: 21.46 KG/M2 | HEART RATE: 72 BPM | SYSTOLIC BLOOD PRESSURE: 110 MMHG | HEIGHT: 68 IN | WEIGHT: 141.6 LBS | DIASTOLIC BLOOD PRESSURE: 80 MMHG

## 2018-02-27 DIAGNOSIS — M54.5 ACUTE LOW BACK PAIN, UNSPECIFIED BACK PAIN LATERALITY, WITH SCIATICA PRESENCE UNSPECIFIED: Primary | ICD-10-CM

## 2018-02-27 DIAGNOSIS — R00.2 HEART PALPITATIONS: ICD-10-CM

## 2018-02-27 DIAGNOSIS — J45.20 MILD INTERMITTENT ASTHMA WITHOUT COMPLICATION: ICD-10-CM

## 2018-02-27 PROBLEM — H91.92 CHANGE IN HEARING OF LEFT EAR: Status: ACTIVE | Noted: 2017-10-17

## 2018-02-27 PROBLEM — M54.50 LOW BACK PAIN: Status: ACTIVE | Noted: 2018-01-08

## 2018-02-27 PROBLEM — R53.83 FATIGUE: Status: ACTIVE | Noted: 2017-10-17

## 2018-02-27 PROCEDURE — 99213 OFFICE O/P EST LOW 20 MIN: CPT | Performed by: FAMILY MEDICINE

## 2018-02-27 RX ORDER — NORGESTIMATE AND ETHINYL ESTRADIOL 0.25-0.035
1 KIT ORAL DAILY
COMMUNITY
End: 2019-12-09

## 2018-02-27 RX ORDER — AZELASTINE 1 MG/ML
2 SPRAY, METERED NASAL AS NEEDED
COMMUNITY
Start: 2017-06-27

## 2018-02-27 RX ORDER — METOPROLOL SUCCINATE 25 MG/1
25 TABLET, EXTENDED RELEASE ORAL DAILY
Qty: 90 TABLET | Refills: 3 | Status: SHIPPED | OUTPATIENT
Start: 2018-02-27 | End: 2019-03-19 | Stop reason: SDUPTHER

## 2018-02-27 RX ORDER — DIPHENOXYLATE HYDROCHLORIDE AND ATROPINE SULFATE 2.5; .025 MG/1; MG/1
1 TABLET ORAL
COMMUNITY

## 2018-02-27 RX ORDER — ALBUTEROL SULFATE 90 UG/1
AEROSOL, METERED RESPIRATORY (INHALATION)
COMMUNITY

## 2018-02-27 RX ORDER — OMEPRAZOLE 20 MG/1
TABLET, DELAYED RELEASE ORAL
COMMUNITY
Start: 2015-03-16 | End: 2018-06-28

## 2018-02-27 RX ORDER — METOPROLOL SUCCINATE 25 MG/1
TABLET, EXTENDED RELEASE ORAL
COMMUNITY
Start: 2018-01-22 | End: 2018-02-27 | Stop reason: SDUPTHER

## 2018-02-27 NOTE — ASSESSMENT & PLAN NOTE
Patient is not currently having any palpitations, however she is having some symptoms that could be part of this  Recommend increase the metoprolol to 25 mg daily  Re-evaluate in approximately 1-2 months  She has an appointment in June, that would be fine  Also, she did mention that there was some increase in reflux symptoms, so I would recommend that she change the omeprazole to daily

## 2018-02-27 NOTE — ASSESSMENT & PLAN NOTE
Low back pain is much improved after physical therapy  At this point, no further treatment  Continue with home exercises

## 2018-02-27 NOTE — PATIENT INSTRUCTIONS
Problem List Items Addressed This Visit     Asthma     Asthma is doing extremely well at this point  Continue with p r n  albuterol  Follow-up as needed  Recommend yearly flu shot  Recommend pneumonia vaccine  Relevant Medications    albuterol (PROAIR HFA) 90 mcg/act inhaler    Heart palpitations     Patient is not currently having any palpitations, however she is having some symptoms that could be part of this  Recommend increase the metoprolol to 25 mg daily  Re-evaluate in approximately 1-2 months  She has an appointment in June, that would be fine  Also, she did mention that there was some increase in reflux symptoms, so I would recommend that she change the omeprazole to daily  Relevant Medications    metoprolol succinate (TOPROL-XL) 25 mg 24 hr tablet    Low back pain - Primary     Low back pain is much improved after physical therapy  At this point, no further treatment  Continue with home exercises

## 2018-02-27 NOTE — ASSESSMENT & PLAN NOTE
Asthma is doing extremely well at this point  Continue with p r n  albuterol  Follow-up as needed  Recommend yearly flu shot  Recommend pneumonia vaccine

## 2018-02-27 NOTE — PROGRESS NOTES
Assessment/Plan:    Asthma  Asthma is doing extremely well at this point  Continue with p r n  albuterol  Follow-up as needed  Recommend yearly flu shot  Recommend pneumonia vaccine  Heart palpitations  Patient is not currently having any palpitations, however she is having some symptoms that could be part of this  Recommend increase the metoprolol to 25 mg daily  Re-evaluate in approximately 1-2 months  She has an appointment in June, that would be fine  Also, she did mention that there was some increase in reflux symptoms, so I would recommend that she change the omeprazole to daily  Low back pain  Low back pain is much improved after physical therapy  At this point, no further treatment  Continue with home exercises  Anxiety disorder  She does have stress, but has not been severe  Will follow as needed  Diagnoses and all orders for this visit:    Acute low back pain, unspecified back pain laterality, with sciatica presence unspecified    Heart palpitations    Mild intermittent asthma without complication    Other orders  -     norgestimate-ethinyl estradiol (ORTHO-CYCLEN) 0 25-35 MG-MCG per tablet; Take 1 tablet by mouth daily  -     azelastine (ASTELIN) 0 1 % nasal spray; 2 sprays into each nostril daily  -     metoprolol succinate (TOPROL-XL) 25 mg 24 hr tablet;   -     multivitamin (THERAGRAN) TABS; Take 1 tablet by mouth  -     omeprazole (PRILOSEC OTC) 20 MG tablet; Take by mouth  -     albuterol (PROAIR HFA) 90 mcg/act inhaler; Inhale          Subjective:      Patient ID: Marylene Alken is a 26 y o  female  Pt states that she feels like her BP meds are not working as well as they should, Pt states she has some issues with highs and lows  Patient reports that after physical therapy her low back pain is much better than what it was  She is noting that she has fatigue at times, especially after work and in the evenings    She also noted that her blood pressure is elevated at that time, and she wondered what to do about this  She feels like the veins in neck are constricting, and head pounding  No palpitations  With regard to her asthma, that has been extremely well controlled  No wheezing  She is not using the rescue inhaler  She does have reflux, but uses Prilosec occasionally  She is using it 2-3 times per week, but reports that she is still having some symptoms  The following portions of the patient's history were reviewed and updated as appropriate: allergies, current medications, past family history, past medical history, past social history, past surgical history and problem list     Review of Systems   Constitutional: Negative  HENT: Negative  Respiratory: Negative  Cardiovascular:        Per HPI   Gastrointestinal:        Continues with some GERD   Psychiatric/Behavioral:        Per HPI         Objective:    Vitals:    02/27/18 0801   BP: 110/80   Pulse: 72   Weight: 64 2 kg (141 lb 9 6 oz)   Height: 5' 8" (1 727 m)         /80   Pulse 72   Ht 5' 8" (1 727 m)   Wt 64 2 kg (141 lb 9 6 oz)   BMI 21 53 kg/m²          Physical Exam   Constitutional: She appears well-developed and well-nourished  HENT:   Head: Normocephalic and atraumatic  Eyes: Pupils are equal, round, and reactive to light  Neck: Normal range of motion  Cardiovascular: Normal rate and regular rhythm  Pulmonary/Chest: Effort normal and breath sounds normal  She has no wheezes  She has no rales  Nursing note and vitals reviewed

## 2018-03-13 ENCOUNTER — OFFICE VISIT (OUTPATIENT)
Dept: FAMILY MEDICINE CLINIC | Facility: CLINIC | Age: 33
End: 2018-03-13
Payer: COMMERCIAL

## 2018-03-13 VITALS
SYSTOLIC BLOOD PRESSURE: 100 MMHG | WEIGHT: 142.4 LBS | HEART RATE: 76 BPM | HEIGHT: 68 IN | BODY MASS INDEX: 21.58 KG/M2 | DIASTOLIC BLOOD PRESSURE: 70 MMHG | TEMPERATURE: 98.2 F

## 2018-03-13 DIAGNOSIS — J01.01 ACUTE RECURRENT MAXILLARY SINUSITIS: Primary | ICD-10-CM

## 2018-03-13 DIAGNOSIS — J30.89 CHRONIC NON-SEASONAL ALLERGIC RHINITIS, UNSPECIFIED TRIGGER: ICD-10-CM

## 2018-03-13 PROCEDURE — 99213 OFFICE O/P EST LOW 20 MIN: CPT | Performed by: FAMILY MEDICINE

## 2018-03-13 RX ORDER — SULFAMETHOXAZOLE AND TRIMETHOPRIM 800; 160 MG/1; MG/1
1 TABLET ORAL EVERY 12 HOURS SCHEDULED
Qty: 20 TABLET | Refills: 0 | Status: SHIPPED | OUTPATIENT
Start: 2018-03-13 | End: 2018-03-23

## 2018-03-13 RX ORDER — CETIRIZINE HYDROCHLORIDE 10 MG/1
10 TABLET ORAL DAILY
Qty: 90 TABLET | Refills: 3
Start: 2018-03-13 | End: 2022-04-22

## 2018-03-13 RX ORDER — TRIAMCINOLONE ACETONIDE 55 UG/1
2 SPRAY, METERED NASAL DAILY
Qty: 60 ACT | Refills: 0 | Status: SHIPPED | OUTPATIENT
Start: 2018-03-13 | End: 2018-04-12

## 2018-03-13 NOTE — PROGRESS NOTES
Assessment/Plan:    Allergic rhinitis  For her allergies, would recommend that she use nasal spray, continue the Zyrtec daily  With regard to nasal spray, she was given Astelin by her allergist, and also is using Ocean nasal spray  I would recommend that she add Nasacort in the short term  I would not use it beyond 1 month  We did discuss the possibility of adding Singulair if the Zyrtec and Astelin did not cover her symptoms  Diagnoses and all orders for this visit:    Acute recurrent maxillary sinusitis  Comments:  Bactrim DS, Mucinex, continue Zyrtec  Orders:  -     sulfamethoxazole-trimethoprim (BACTRIM DS) 800-160 mg per tablet; Take 1 tablet by mouth every 12 (twelve) hours for 10 days    Chronic non-seasonal allergic rhinitis, unspecified trigger  -     cetirizine (ZyrTEC) 10 mg tablet; Take 1 tablet (10 mg total) by mouth daily  -     Triamcinolone Acetonide 55 MCG/ACT AERO; 2 Act (110 mcg total) into each nostril daily for 30 days          Subjective:      Patient ID: Da Flores is a 26 y o  female  CC:  Pt c/o inflamed sinuses, nose bleeds, crackling in (l) ear, facial and teeth pain  Symptoms present off and on for 2 months  Please see the cc  She has had symptoms on and off for the last 2 months  Lasts about 2 weeks, then gets better  Dust seems to start it  She has been to allergist, and was told to take Zyrtec, but she didn't know how long she could take it  The following portions of the patient's history were reviewed and updated as appropriate: allergies, current medications, past medical history, past social history and problem list     Review of Systems   Constitutional:        Per HPI   HENT: Positive for nosebleeds, postnasal drip, rhinorrhea, sinus pain and sinus pressure  Negative for sore throat  Per HPI   Respiratory: Negative for cough and shortness of breath            Objective:    Vitals:    03/13/18 0830   BP: 100/70   BP Location: Left arm   Patient Position: Sitting   Cuff Size: Standard   Pulse: 76   Temp: 98 2 °F (36 8 °C)   TempSrc: Tympanic   Weight: 64 6 kg (142 lb 6 4 oz)   Height: 5' 8" (1 727 m)     /70 (BP Location: Left arm, Patient Position: Sitting, Cuff Size: Standard)   Pulse 76   Temp 98 2 °F (36 8 °C) (Tympanic)   Ht 5' 8" (1 727 m)   Wt 64 6 kg (142 lb 6 4 oz)   BMI 21 65 kg/m²          Physical Exam   Constitutional: She appears well-developed and well-nourished  HENT:   Head: Normocephalic and atraumatic  Right Ear: Hearing, tympanic membrane, external ear and ear canal normal    Left Ear: Hearing, tympanic membrane, external ear and ear canal normal    Nose: Mucosal edema and rhinorrhea present  No nose lacerations, sinus tenderness, nasal deformity, septal deviation or nasal septal hematoma  No epistaxis  No foreign bodies  Right sinus exhibits maxillary sinus tenderness  Right sinus exhibits no frontal sinus tenderness  Left sinus exhibits maxillary sinus tenderness  Left sinus exhibits no frontal sinus tenderness  Cardiovascular: Normal rate, regular rhythm and normal heart sounds  Pulses:       Carotid pulses are 2+ on the right side, and 2+ on the left side  Pulmonary/Chest: Effort normal and breath sounds normal  She has no wheezes  She has no rales  She exhibits no tenderness  Lymphadenopathy:     She has no cervical adenopathy  Nursing note and vitals reviewed

## 2018-03-13 NOTE — PATIENT INSTRUCTIONS
Problem List Items Addressed This Visit     Allergic rhinitis     For her allergies, would recommend that she use nasal spray, continue the Zyrtec daily  With regard to nasal spray, she was given Astelin by her allergist, and also is using Ocean nasal spray  I would recommend that she add Nasacort in the short term  I would not use it beyond 1 month  We did discuss the possibility of adding Singulair if the Zyrtec and Astelin did not cover her symptoms  Relevant Medications    cetirizine (ZyrTEC) 10 mg tablet    Triamcinolone Acetonide 55 MCG/ACT AERO      Other Visit Diagnoses     Acute recurrent maxillary sinusitis    -  Primary    Bactrim DS, Mucinex, continue Zyrtec      Relevant Medications    sulfamethoxazole-trimethoprim (BACTRIM DS) 800-160 mg per tablet

## 2018-03-13 NOTE — ASSESSMENT & PLAN NOTE
For her allergies, would recommend that she use nasal spray, continue the Zyrtec daily  With regard to nasal spray, she was given Astelin by her allergist, and also is using Ocean nasal spray  I would recommend that she add Nasacort in the short term  I would not use it beyond 1 month  We did discuss the possibility of adding Singulair if the Zyrtec and Astelin did not cover her symptoms

## 2018-04-13 ENCOUNTER — TELEPHONE (OUTPATIENT)
Dept: FAMILY MEDICINE CLINIC | Facility: CLINIC | Age: 33
End: 2018-04-13

## 2018-04-13 NOTE — TELEPHONE ENCOUNTER
SHE IS ON METATOPROLOL STARTED NEW WORK OUT PROGRAM AND WANTED TO KNOW IF THERE ARE ANY RESTRICTIONS GETTING HER HEART RATE UP ON THIS MEDICATION, PLEASE CALL PT BACK

## 2018-06-18 ENCOUNTER — OFFICE VISIT (OUTPATIENT)
Dept: FAMILY MEDICINE CLINIC | Facility: CLINIC | Age: 33
End: 2018-06-18
Payer: COMMERCIAL

## 2018-06-18 VITALS
BODY MASS INDEX: 20.16 KG/M2 | HEART RATE: 76 BPM | HEIGHT: 68 IN | SYSTOLIC BLOOD PRESSURE: 112 MMHG | WEIGHT: 133 LBS | DIASTOLIC BLOOD PRESSURE: 82 MMHG

## 2018-06-18 DIAGNOSIS — R00.2 HEART PALPITATIONS: Primary | ICD-10-CM

## 2018-06-18 DIAGNOSIS — K21.9 GASTROESOPHAGEAL REFLUX DISEASE WITHOUT ESOPHAGITIS: ICD-10-CM

## 2018-06-18 PROCEDURE — 99213 OFFICE O/P EST LOW 20 MIN: CPT | Performed by: FAMILY MEDICINE

## 2018-06-18 RX ORDER — OMEPRAZOLE 40 MG/1
40 CAPSULE, DELAYED RELEASE ORAL DAILY
Qty: 90 CAPSULE | Refills: 1 | Status: SHIPPED | OUTPATIENT
Start: 2018-06-18 | End: 2018-12-20 | Stop reason: SDUPTHER

## 2018-06-18 NOTE — PATIENT INSTRUCTIONS
Problem List Items Addressed This Visit        Digestive    GERD (gastroesophageal reflux disease)     Patient does have a significant amount of reflux symptoms  She is using omeprazole over-the-counter daily  She still has some breakthrough with that  She is talking with Gastroenterology about different diet changes, and perhaps some other follow-up that would be needed such as EGD  At the moment, would recommend increasing omeprazole to 40 mg daily  Prescription will be sent to the pharmacy  Other    Heart palpitations - Primary     Stable at the moment  She is improved versus before  Continue on the metoprolol without change  Re-evaluate in the future in approximately 6 months

## 2018-06-18 NOTE — PROGRESS NOTES
Assessment/Plan:    No problem-specific Assessment & Plan notes found for this encounter  Diagnoses and all orders for this visit:    Heart palpitations    Gastroesophageal reflux disease without esophagitis          Subjective: Follow up to chronic conditions  mjs     Patient ID: Lee Gipson is a 26 y o  female  Patient reports that she is not currently having any palpitations symptoms  She is on the metoprolol, and doing extremely well  No orthostasis, no other problems with the metoprolol  The following portions of the patient's history were reviewed and updated as appropriate: allergies, current medications and problem list     Review of Systems   Constitutional: Negative  HENT: Negative  Respiratory: Negative  Cardiovascular: Negative  Objective:      /82   Pulse 76   Ht 5' 8" (1 727 m)   Wt 60 3 kg (133 lb)   BMI 20 22 kg/m²          Physical Exam   Constitutional: She appears well-developed and well-nourished  HENT:   Head: Normocephalic and atraumatic  Cardiovascular: Normal rate, regular rhythm and normal heart sounds  Exam reveals no gallop and no friction rub  No murmur heard  Pulses:       Carotid pulses are 2+ on the right side, and 2+ on the left side  Pulmonary/Chest: Effort normal and breath sounds normal  She has no wheezes  She has no rales  She exhibits no tenderness  Nursing note and vitals reviewed

## 2018-06-18 NOTE — ASSESSMENT & PLAN NOTE
Patient does have a significant amount of reflux symptoms  She is using omeprazole over-the-counter daily  She still has some breakthrough with that  She is talking with Gastroenterology about different diet changes, and perhaps some other follow-up that would be needed such as EGD  At the moment, would recommend increasing omeprazole to 40 mg daily  Prescription will be sent to the pharmacy

## 2018-06-28 ENCOUNTER — HOSPITAL ENCOUNTER (EMERGENCY)
Facility: HOSPITAL | Age: 33
Discharge: HOME | End: 2018-06-28
Attending: EMERGENCY MEDICINE | Admitting: EMERGENCY MEDICINE
Payer: COMMERCIAL

## 2018-06-28 VITALS
DIASTOLIC BLOOD PRESSURE: 65 MMHG | OXYGEN SATURATION: 100 % | SYSTOLIC BLOOD PRESSURE: 109 MMHG | TEMPERATURE: 97 F | RESPIRATION RATE: 16 BRPM | HEART RATE: 76 BPM

## 2018-06-28 DIAGNOSIS — R55 NEAR SYNCOPE: Primary | ICD-10-CM

## 2018-06-28 LAB
ALBUMIN SERPL BCP-MCNC: 4.1 G/DL (ref 3.5–5)
ALP SERPL-CCNC: 36 U/L (ref 46–116)
ALT SERPL W P-5'-P-CCNC: 26 U/L (ref 12–78)
ANION GAP SERPL CALCULATED.3IONS-SCNC: 10 MMOL/L (ref 4–13)
AST SERPL W P-5'-P-CCNC: 26 U/L (ref 5–45)
ATRIAL RATE: 96 BPM
BASOPHILS # BLD AUTO: 0.03 THOUSANDS/ΜL (ref 0–0.1)
BASOPHILS NFR BLD AUTO: 1 % (ref 0–1)
BILIRUB DIRECT SERPL-MCNC: 0.11 MG/DL (ref 0–0.2)
BILIRUB SERPL-MCNC: 0.4 MG/DL (ref 0.2–1)
BILIRUB UR QL STRIP: NEGATIVE
BUN SERPL-MCNC: 21 MG/DL (ref 5–25)
CALCIUM SERPL-MCNC: 8.8 MG/DL (ref 8.3–10.1)
CHLORIDE SERPL-SCNC: 99 MMOL/L (ref 100–108)
CLARITY UR: CLEAR
CO2 SERPL-SCNC: 26 MMOL/L (ref 21–32)
COLOR UR: YELLOW
COLOR, POC: YELLOW
CREAT SERPL-MCNC: 0.96 MG/DL (ref 0.6–1.3)
EOSINOPHIL # BLD AUTO: 0.16 THOUSAND/ΜL (ref 0–0.61)
EOSINOPHIL NFR BLD AUTO: 3 % (ref 0–6)
ERYTHROCYTE [DISTWIDTH] IN BLOOD BY AUTOMATED COUNT: 12.9 % (ref 11.6–15.1)
EXT PREG TEST URINE: NEGATIVE
GFR SERPL CREATININE-BSD FRML MDRD: 77 ML/MIN/1.73SQ M
GLUCOSE SERPL-MCNC: 104 MG/DL (ref 65–140)
GLUCOSE UR STRIP-MCNC: NEGATIVE MG/DL
HCT VFR BLD AUTO: 41.3 % (ref 34.8–46.1)
HGB BLD-MCNC: 14 G/DL (ref 11.5–15.4)
HGB UR QL STRIP.AUTO: NEGATIVE
KETONES UR STRIP-MCNC: NEGATIVE MG/DL
LEUKOCYTE ESTERASE UR QL STRIP: NEGATIVE
LYMPHOCYTES # BLD AUTO: 1.56 THOUSANDS/ΜL (ref 0.6–4.47)
LYMPHOCYTES NFR BLD AUTO: 26 % (ref 14–44)
MAGNESIUM SERPL-MCNC: 1.6 MG/DL (ref 1.6–2.6)
MCH RBC QN AUTO: 31.8 PG (ref 26.8–34.3)
MCHC RBC AUTO-ENTMCNC: 33.9 G/DL (ref 31.4–37.4)
MCV RBC AUTO: 94 FL (ref 82–98)
MONOCYTES # BLD AUTO: 0.53 THOUSAND/ΜL (ref 0.17–1.22)
MONOCYTES NFR BLD AUTO: 9 % (ref 4–12)
NEUTROPHILS # BLD AUTO: 3.68 THOUSANDS/ΜL (ref 1.85–7.62)
NEUTS SEG NFR BLD AUTO: 62 % (ref 43–75)
NITRITE UR QL STRIP: NEGATIVE
NRBC BLD AUTO-RTO: 0 /100 WBCS
P AXIS: 33 DEGREES
PH UR STRIP.AUTO: 6.5 [PH] (ref 4.5–8)
PLATELET # BLD AUTO: 164 THOUSANDS/UL (ref 149–390)
PMV BLD AUTO: 9.9 FL (ref 8.9–12.7)
POTASSIUM SERPL-SCNC: 3.9 MMOL/L (ref 3.5–5.3)
PR INTERVAL: 100 MS
PROT SERPL-MCNC: 7.6 G/DL (ref 6.4–8.2)
PROT UR STRIP-MCNC: NEGATIVE MG/DL
QRS AXIS: 80 DEGREES
QRSD INTERVAL: 80 MS
QT INTERVAL: 360 MS
QTC INTERVAL: 454 MS
RBC # BLD AUTO: 4.4 MILLION/UL (ref 3.81–5.12)
SODIUM SERPL-SCNC: 135 MMOL/L (ref 136–145)
SP GR UR STRIP.AUTO: 1.02 (ref 1–1.03)
T WAVE AXIS: 39 DEGREES
TSH SERPL DL<=0.05 MIU/L-ACNC: 2.74 UIU/ML (ref 0.36–3.74)
UROBILINOGEN UR QL STRIP.AUTO: 0.2 E.U./DL
VENTRICULAR RATE: 96 BPM
WBC # BLD AUTO: 5.96 THOUSAND/UL (ref 4.31–10.16)

## 2018-06-28 PROCEDURE — 81025 URINE PREGNANCY TEST: CPT | Performed by: EMERGENCY MEDICINE

## 2018-06-28 PROCEDURE — 85025 COMPLETE CBC W/AUTO DIFF WBC: CPT | Performed by: EMERGENCY MEDICINE

## 2018-06-28 PROCEDURE — 36415 COLL VENOUS BLD VENIPUNCTURE: CPT | Performed by: EMERGENCY MEDICINE

## 2018-06-28 PROCEDURE — 81003 URINALYSIS AUTO W/O SCOPE: CPT

## 2018-06-28 PROCEDURE — 83735 ASSAY OF MAGNESIUM: CPT | Performed by: EMERGENCY MEDICINE

## 2018-06-28 PROCEDURE — 93010 ELECTROCARDIOGRAM REPORT: CPT | Performed by: INTERNAL MEDICINE

## 2018-06-28 PROCEDURE — 96361 HYDRATE IV INFUSION ADD-ON: CPT

## 2018-06-28 PROCEDURE — 80076 HEPATIC FUNCTION PANEL: CPT | Performed by: EMERGENCY MEDICINE

## 2018-06-28 PROCEDURE — 93005 ELECTROCARDIOGRAM TRACING: CPT

## 2018-06-28 PROCEDURE — 99285 EMERGENCY DEPT VISIT HI MDM: CPT

## 2018-06-28 PROCEDURE — 96360 HYDRATION IV INFUSION INIT: CPT

## 2018-06-28 PROCEDURE — 84443 ASSAY THYROID STIM HORMONE: CPT | Performed by: EMERGENCY MEDICINE

## 2018-06-28 PROCEDURE — 80048 BASIC METABOLIC PNL TOTAL CA: CPT | Performed by: EMERGENCY MEDICINE

## 2018-06-28 RX ADMIN — SODIUM CHLORIDE 1000 ML: 0.9 INJECTION, SOLUTION INTRAVENOUS at 16:13

## 2018-06-28 NOTE — ED PROVIDER NOTES
History  Chief Complaint   Patient presents with    Palpitations     approx 15 min ago patient at work sitting when she developed shortness of breath, lightheadedness, palpitations, and tingling down b/l arms  History provided by:  Patient   used: No    Medical Problem - Major   Location:  Near syncope while sitting at her desk  Severity:  Severe  Onset quality:  Sudden  Duration: About an hour prior to arrival   Timing:  Constant  Progression:  Partially resolved  Chronicity:  New  Relieved by:  Nothing  Worsened by:  Nothing  Ineffective treatments:  None tried  Associated symptoms: abdominal pain and diarrhea    Associated symptoms: no chest pain, no congestion, no cough, no fever, no headaches, no nausea, no shortness of breath, no sore throat and no vomiting     Patient has been having some digestive issues recently and has changed her diet  She also drinks at least 3-4 drinks a night  She has hypertension and takes Toprol XL for that  Denies drug use  States has been eating which she feels is fairly normally and did eat today  She was sitting at her desk at work  The work of arm it is rather stressful but she did not really feel stressed out  She denied chest pain or shortness of breath but did feel like her heart was beating fast   It felt worse when she stood up that she was feeling like she was going to pass out  She is complaining of numbness and tingling to the bilateral upper lower extremities  Prior to Admission Medications   Prescriptions Last Dose Informant Patient Reported? Taking?    albuterol (PROAIR HFA) 90 mcg/act inhaler  Self Yes Yes   Sig: Inhale   azelastine (ASTELIN) 0 1 % nasal spray  Self Yes Yes   Si sprays into each nostril as needed     cetirizine (ZyrTEC) 10 mg tablet  Self No Yes   Sig: Take 1 tablet (10 mg total) by mouth daily   Patient taking differently: Take 10 mg by mouth as needed     metoprolol succinate (TOPROL-XL) 25 mg 24 hr tablet Self No Yes   Sig: Take 1 tablet (25 mg total) by mouth daily   multivitamin (THERAGRAN) TABS  Self Yes Yes   Sig: Take 1 tablet by mouth   norgestimate-ethinyl estradiol (ORTHO-CYCLEN) 0 25-35 MG-MCG per tablet  Self Yes Yes   Sig: Take 1 tablet by mouth daily   omeprazole (PriLOSEC) 40 MG capsule   No Yes   Sig: Take 1 capsule (40 mg total) by mouth daily for 180 days      Facility-Administered Medications: None       Past Medical History:   Diagnosis Date    Eating disorder        History reviewed  No pertinent surgical history  Family History   Problem Relation Age of Onset    Irregular heart beat Mother         Proxysmal Atrial Tachycardia, PVC    Asthma Father     Hyperlipidemia Father     Hypertension Father     Prostate cancer Paternal Grandfather      I have reviewed and agree with the history as documented  Social History   Substance Use Topics    Smoking status: Former Smoker    Smokeless tobacco: Never Used    Alcohol use Yes        Review of Systems   Constitutional: Negative for chills and fever  HENT: Negative for congestion and sore throat  Respiratory: Negative for cough, chest tightness and shortness of breath  Cardiovascular: Positive for palpitations  Negative for chest pain and leg swelling  Gastrointestinal: Positive for abdominal pain and diarrhea  Negative for nausea and vomiting  Genitourinary: Negative for dysuria and flank pain  Neurological: Positive for light-headedness  Negative for dizziness, facial asymmetry, weakness, numbness and headaches  All other systems reviewed and are negative  Physical Exam  Physical Exam   Constitutional: She appears well-developed and well-nourished  She is cooperative  Non-toxic appearance  She does not have a sickly appearance  She does not appear ill  No distress  HENT:   Head: Normocephalic and atraumatic  Right Ear: Hearing normal  No drainage or swelling  Left Ear: Hearing normal  No drainage or swelling  Mouth/Throat: Mucous membranes are normal    Eyes: Conjunctivae and lids are normal  Right eye exhibits no discharge  Left eye exhibits no discharge  Neck: Trachea normal and normal range of motion  No JVD present  Cardiovascular: Normal rate, regular rhythm, normal heart sounds, intact distal pulses and normal pulses  Exam reveals no gallop and no friction rub  No murmur heard  Pulmonary/Chest: Effort normal and breath sounds normal  No stridor  No respiratory distress  She has no wheezes  She has no rales  Abdominal: Soft  Normal appearance and bowel sounds are normal  She exhibits no distension, no ascites and no mass  There is no hepatosplenomegaly  There is no tenderness  There is no rebound, no guarding and no CVA tenderness  No hernia  Musculoskeletal: Normal range of motion  She exhibits no edema or tenderness  Lymphadenopathy:     She has no cervical adenopathy  Right: No inguinal adenopathy present  Left: No inguinal adenopathy present  Neurological: She is alert  She has normal strength  No cranial nerve deficit or sensory deficit  She exhibits normal muscle tone  Coordination and gait normal  GCS eye subscore is 4  GCS verbal subscore is 5  GCS motor subscore is 6  Skin: Skin is warm, dry and intact  No rash noted  She is not diaphoretic  No pallor  Psychiatric: She has a normal mood and affect  Her speech is normal  Cognition and memory are normal    Nursing note and vitals reviewed        Vital Signs  ED Triage Vitals   Temperature Pulse Respirations Blood Pressure SpO2   06/28/18 1458 06/28/18 1458 06/28/18 1458 06/28/18 1458 06/28/18 1458   (!) 97 °F (36 1 °C) (!) 109 20 (!) 172/109 100 %      Temp src Heart Rate Source Patient Position - Orthostatic VS BP Location FiO2 (%)   -- 06/28/18 1458 06/28/18 1604 06/28/18 1604 --    Monitor Lying Right arm       Pain Score       06/28/18 1458       No Pain           Vitals:    06/28/18 1458 06/28/18 1604   BP: (!) 172/109 137/82   Pulse: (!) 109 89   Patient Position - Orthostatic VS:  Lying       Visual Acuity      ED Medications  Medications   sodium chloride 0 9 % bolus 1,000 mL (1,000 mL Intravenous New Bag 6/28/18 1613)       Diagnostic Studies  Results Reviewed     Procedure Component Value Units Date/Time    Basic metabolic panel [[id number]]  (Abnormal) Collected:  06/28/18 1613    Lab Status:  Final result Specimen:  Blood from Arm, Right Updated:  06/28/18 1710     Sodium 135 (L) mmol/L      Potassium 3 9 mmol/L      Chloride 99 (L) mmol/L      CO2 26 mmol/L      Anion Gap 10 mmol/L      BUN 21 mg/dL      Creatinine 0 96 mg/dL      Glucose 104 mg/dL      Calcium 8 8 mg/dL      eGFR 77 ml/min/1 73sq m     Narrative:         National Kidney Disease Education Program recommendations are as follows:  GFR calculation is accurate only with a steady state creatinine  Chronic Kidney disease less than 60 ml/min/1 73 sq  meters  Kidney failure less than 15 ml/min/1 73 sq  meters  TSH [[id number]]  (Normal) Collected:  06/28/18 1613    Lab Status:  Final result Specimen:  Blood from Arm, Right Updated:  06/28/18 1710     TSH 3RD GENERATON 2 736 uIU/mL     Narrative:         Patients undergoing fluorescein dye angiography may retain small amounts of fluorescein in the body for 48-72 hours post procedure  Samples containing fluorescein can produce falsely depressed TSH values  If the patient had this procedure,a specimen should be resubmitted post fluorescein clearance            The recommended reference ranges for TSH during pregnancy are as follows:  First trimester 0 1 to 2 5 uIU/mL  Second trimester  0 2 to 3 0 uIU/mL  Third trimester 0 3 to 3 0 uIU/m      Magnesium [[id number]]  (Normal) Collected:  06/28/18 1613    Lab Status:  Final result Specimen:  Blood from Arm, Right Updated:  06/28/18 1710     Magnesium 1 6 mg/dL     Hepatic function panel [[id number]]  (Abnormal) Collected:  06/28/18 1613    Lab Status:  Final result Specimen: Blood from Arm, Right Updated:  06/28/18 1710     Total Bilirubin 0 40 mg/dL      Bilirubin, Direct 0 11 mg/dL      Alkaline Phosphatase 36 (L) U/L      AST 26 U/L      ALT 26 U/L      Total Protein 7 6 g/dL      Albumin 4 1 g/dL     CBC and differential [[id number]] Collected:  06/28/18 1613    Lab Status:  Final result Specimen:  Blood from Arm, Right Updated:  06/28/18 1619     WBC 5 96 Thousand/uL      RBC 4 40 Million/uL      Hemoglobin 14 0 g/dL      Hematocrit 41 3 %      MCV 94 fL      MCH 31 8 pg      MCHC 33 9 g/dL      RDW 12 9 %      MPV 9 9 fL      Platelets 861 Thousands/uL      nRBC 0 /100 WBCs      Neutrophils Relative 62 %      Lymphocytes Relative 26 %      Monocytes Relative 9 %      Eosinophils Relative 3 %      Basophils Relative 1 %      Neutrophils Absolute 3 68 Thousands/µL      Lymphocytes Absolute 1 56 Thousands/µL      Monocytes Absolute 0 53 Thousand/µL      Eosinophils Absolute 0 16 Thousand/µL      Basophils Absolute 0 03 Thousands/µL     POCT pregnancy, urine [[id number]]  (Normal) Resulted:  06/28/18 1618    Lab Status:  Final result Updated:  06/28/18 1618     EXT PREG TEST UR (Ref: Negative) negative    POCT urinalysis dipstick [[id number]]  (Normal) Resulted:  06/28/18 1618    Lab Status:  Final result Specimen:  Urine Updated:  06/28/18 1618     Color, UA yellow    ED Urine Macroscopic [[id number]] Collected:  06/28/18 1622    Lab Status:  Final result Specimen:  Urine Updated:  06/28/18 1617     Color, UA Yellow     Clarity, UA Clear     pH, UA 6 5     Leukocytes, UA Negative     Nitrite, UA Negative     Protein, UA Negative mg/dl      Glucose, UA Negative mg/dl      Ketones, UA Negative mg/dl      Urobilinogen, UA 0 2 E U /dl      Bilirubin, UA Negative     Blood, UA Negative     Specific Gravity, UA 1 025    Narrative:       CLINITEK RESULT                 No orders to display              Procedures  ECG 12 Lead Documentation  Date/Time: 6/28/2018 3:25 PM  Performed by: Sonali Bear FARHAD FLOR  Authorized by: Flores Castellon     Indications / Diagnosis:  Palpitations  ECG reviewed by me, the ED Provider: yes    Patient location:  ED  Interpretation:     Interpretation: non-specific    Rate:     ECG rate:  96    ECG rate assessment: normal    Rhythm:     Rhythm: sinus rhythm    Ectopy:     Ectopy: none    QRS:     QRS axis:  Normal    QRS intervals:  Normal  Conduction:     Conduction: normal    ST segments:     ST segments:  Non-specific  T waves:     T waves: non-specific             Phone Contacts  ED Phone Contact    ED Course                               MDM  Number of Diagnoses or Management Options  Near syncope:   Diagnosis management comments: Vital signs of improved  Will fluid hydrate  I did discuss with her eliminating the alcohol portion as 4 drinks per evening may be too many for her and this could be contributing to some of her digestive issues  EKG and laboratory studies are unremarkable and I did discuss all these with her  She does work in a very stressful environment also mentioned trying to do some other alternative methods for stress relief  Amount and/or Complexity of Data Reviewed  Clinical lab tests: reviewed and ordered  Tests in the medicine section of CPT®: ordered and reviewed    Patient Progress  Patient progress: stable    CritCare Time    Disposition  Final diagnoses:   Near syncope     Time reflects when diagnosis was documented in both MDM as applicable and the Disposition within this note     Time User Action Codes Description Comment    6/28/2018  5:27 PM Tuan Amezquita Add [R55] Near syncope       ED Disposition     ED Disposition Condition Comment    Discharge  [address] discharge to home/self care      Condition at discharge: Good        Follow-up Information     Follow up With Specialties Details Why Yarelis Miller MD [facility] Schedule an appointment as soon as possible for a visit in 2 days If symptoms worsen [address]  [facility]  [phone]            Patient's Medications   Discharge Prescriptions    No medications on file     No discharge procedures on file      ED Provider  Electronically Signed by           Tracie Christine MD  06/28/18 7093

## 2018-06-28 NOTE — DISCHARGE INSTRUCTIONS
Syncope   WHAT YOU NEED TO KNOW:   Syncope is also called fainting or passing out  Syncope is a sudden, temporary loss of consciousness, followed by a fall from a standing or sitting position  Syncope ranges from not serious to a sign of a more serious condition that needs to be treated  You can control some health conditions that cause syncope  Your healthcare providers can help you create a plan to manage syncope and prevent episodes  DISCHARGE INSTRUCTIONS:   Seek care immediately if:   · You are bleeding because you hit your head when you fainted  · You suddenly have double vision, difficulty speaking, numbness, and cannot move your arms or legs  · You have chest pain and trouble breathing  · You vomit blood or material that looks like coffee grounds  · You see blood in your bowel movement  Contact your healthcare provider if:   · You have new or worsening symptoms  · You have another syncope episode  · You have a headache, fast heartbeat, or feel too dizzy to stand up  · You have questions or concerns about your condition or care  Follow up with your healthcare provider as directed:  Write down your questions so you remember to ask them during your visits  Manage syncope:   · Keep a record of your syncope episodes  Include your symptoms and your activity before and after the episode  The record can help your healthcare provider find the cause of your syncope and help you manage episodes  · Sit or lie down when needed  This includes when you feel dizzy, your throat is getting tight, and your vision changes  Raise your legs above the level of your heart  · Take slow, deep breaths if you start to breathe faster with anxiety or fear  This can help decrease dizziness and the feeling that you might faint  · Check your blood pressure often  This is important if you take medicine to lower your blood pressure   Check your blood pressure when you are lying down and when you are standing  Ask how often to check during the day  Keep a record of your blood pressure numbers  Your healthcare provider may use the record to help plan your treatment  Prevent a syncope episode:   · Move slowly and let yourself get used to one position before you move to another position  This is very important when you change from a lying or sitting position to a standing position  Take some deep breaths before you stand up from a lying position  Stand up slowly  Sudden movements may cause a fainting spell  Sit on the side of the bed or couch for a few minutes before you stand up  If you are on bedrest, try to be upright for about 2 hours each day, or as directed  Do not lock your legs if you are standing for a long period of time  Move your legs and bend your knees to keep blood flowing  · Follow your healthcare provider's recommendations  Your provider may  recommend that you drink more liquids to prevent dehydration  You may also need to have more salt to keep your blood pressure from dropping too low and causing syncope  Your provider will tell you how much liquid and sodium to have each day  · Watch for signs of low blood sugar  These include hunger, nervousness, sweating, and fast or fluttery heartbeats  Talk with your healthcare provider about ways to keep your blood sugar level steady  · Do not strain if you are constipated  You may faint if you strain to have a bowel movement  Walking is the best way to get your bowels moving  Eat foods high in fiber to make it easier to have a bowel movement  Good examples are high-fiber cereals, beans, vegetables, and whole-grain breads  Prune juice may help make bowel movements softer  · Be careful in hot weather  Heat can cause a syncope episode  Limit activity done outside on hot days  Physical activity in hot weather can lead to dehydration  This can cause an episode    © 2017 [employer] Information is for End User's use only and may not be sold, redistributed or otherwise used for commercial purposes  All illustrations and images included in CareNotes® are the copyrighted property of [employer]  or [address]  The above information is an  only  It is not intended as medical advice for individual conditions or treatments  Talk to your doctor, nurse or pharmacist before following any medical regimen to see if it is safe and effective for you

## 2018-08-27 ENCOUNTER — TELEPHONE (OUTPATIENT)
Dept: PSYCHIATRY | Facility: CLINIC | Age: 33
End: 2018-08-27

## 2018-08-27 ENCOUNTER — OFFICE VISIT (OUTPATIENT)
Dept: FAMILY MEDICINE CLINIC | Facility: CLINIC | Age: 33
End: 2018-08-27
Payer: COMMERCIAL

## 2018-08-27 VITALS
BODY MASS INDEX: 20.61 KG/M2 | SYSTOLIC BLOOD PRESSURE: 110 MMHG | DIASTOLIC BLOOD PRESSURE: 84 MMHG | WEIGHT: 136 LBS | HEIGHT: 68 IN | HEART RATE: 88 BPM

## 2018-08-27 DIAGNOSIS — F41.1 GENERALIZED ANXIETY DISORDER: Primary | ICD-10-CM

## 2018-08-27 PROCEDURE — 3008F BODY MASS INDEX DOCD: CPT | Performed by: FAMILY MEDICINE

## 2018-08-27 PROCEDURE — 99213 OFFICE O/P EST LOW 20 MIN: CPT | Performed by: FAMILY MEDICINE

## 2018-08-27 PROCEDURE — 1036F TOBACCO NON-USER: CPT | Performed by: FAMILY MEDICINE

## 2018-08-27 RX ORDER — ESCITALOPRAM OXALATE 10 MG/1
10 TABLET ORAL DAILY
Qty: 30 TABLET | Refills: 5 | Status: SHIPPED | OUTPATIENT
Start: 2018-08-27 | End: 2018-12-20 | Stop reason: SDUPTHER
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
## 2018-08-27 NOTE — PATIENT INSTRUCTIONS
Problem List Items Addressed This Visit        Other    Anxiety disorder - Primary     Patient is having significant amount of anxiety recently, including increased panic symptoms  She is not currently on medications  I recommend that she start Lexapro at 10 mg daily  Follow-up in approximately 2 weeks  She can also follow with Psychiatry or Psychology in the future  I would prefer that she see Psychology initially             Relevant Medications    escitalopram (LEXAPRO) 10 mg tablet    Other Relevant Orders    Ambulatory referral to Social Work

## 2018-08-27 NOTE — ASSESSMENT & PLAN NOTE
Patient is having significant amount of anxiety recently, including increased panic symptoms  She is not currently on medications  I recommend that she start Lexapro at 10 mg daily  Follow-up in approximately 2 weeks  She can also follow with Psychiatry or Psychology in the future  I would prefer that she see Psychology initially

## 2018-08-27 NOTE — TELEPHONE ENCOUNTER
Behavorial Health Outpatient Intake Questions    Referred by: DR ALEYDA GALLARDO    Check with provider before scheduling    Are there any developmental disabilities? No    Does the patient have hearing impairment? No    Does the patient have ICM or CTT? No    Taking injectable psychiatric medications? NoIf yes, patient can not be seen here  Has the patient ever seen or currently see a psychiatrist? Yes If yes who/when? 12 YRS AGO,[facility] DEVLIN,DEPRESSION X 1 YR    Has the patient ever seen or currently see a therapist? Yes If yes who/when? 3 YRS AGO,[facility]ARTE NICOLE X ON AND OFF X 4 YRS    How many visits did the pt have for previous psychiatric treatment?  History    Has the patient served in the [place] [address]? No    If yes, have you had combat services? No    Was the patient activated into federal active duty as a member of the national guard or reserve? No    Minor Child    Who has custody of the child? Is there a custody agreement? If there is a custody agreement remind parent that they must bring a copy to the first appt or they will not be seen  [facility] Outpatient Intake History     Presenting Problem (in patient's words) DEPRESSION,ANXIETY,PANIC ISSUES    Substance Abuse:No concerns of substance abuse are reported  Has the patient been seen here previously, either inpatient or outpatient? No outpatient    If seen as outpatient, what provider(s) did the patient see? N/A    A member of the patient's family has been in therapy here with NO    ACCEPTED as a patient Yes Appointment Date: 10/29/18  @ 10:00AM  [facility]    Referred Elsewhere?  No    Primary Care Physician: Zeferino Vega MD    PCP telephone number: [phone]    [facility] [address]  INS: [address]  ID: [id number]      GRP: [id number]  TEL: [phone]  SECONDARY:  SUB:RIKA  INS: [address]  ID: [id number]   UUR:[id number]  TEL: [phone]

## 2018-10-29 ENCOUNTER — OFFICE VISIT (OUTPATIENT)
Dept: BEHAVIORAL/MENTAL HEALTH CLINIC | Facility: CLINIC | Age: 33
End: 2018-10-29
Payer: COMMERCIAL

## 2018-10-29 DIAGNOSIS — F32.A DEPRESSION, UNSPECIFIED DEPRESSION TYPE: ICD-10-CM

## 2018-10-29 DIAGNOSIS — F41.1 GENERALIZED ANXIETY DISORDER: Primary | ICD-10-CM

## 2018-10-29 PROCEDURE — 90791 PSYCH DIAGNOSTIC EVALUATION: CPT | Performed by: SOCIAL WORKER
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
## 2018-10-29 NOTE — PSYCH
Jeannie Childersaixa Svrcek  1985       Date of Initial Treatment Plan: 10/29/18   Date of Current Treatment Plan: 10/29/18    Treatment Plan Number 1     Strengths/Personal Resources for Self Care: Resilient, creative    Diagnosis:   No diagnosis found  Area of Needs:   Grief/loss  Stress      Long Term Goal 1: Be able to cope with the losses    Target Date: 1/29/19  Completion Date: TBD         Short Term Objectives for Goal 1:       Be able to talk about it      Do not minimize what I am feeling      Use my support system    Long Term Goal 2: Be able to manage my stress    Target Date: 1/29/19  Completion Date: TBD    Short Term Objectives for Goal 2:   Set boundaries with others  Be able to identify my triggers  Use healthy coping mechanisms for stress         GOAL 1: Modality: Individual 1-2x per month   Completion Date TBD and The person(s) responsible for carrying out the plan is  Peace Shaw    GOAL 2: Modality: Individual 1-2x per month   Completion Date TBD and The person(s) responsible for carrying out the plan is  [employer]: Diagnosis and Treatment Plan explained to Ever Aguilera relates understanding diagnosis and is agreeable to Treatment Plan         Client Comments : Please share your thoughts, feelings, need and/or experiences regarding your treatment plan:       __________________________________________________________________    __________________________________________________________________    __________________________________________________________________    __________________________________________________________________    _______________________________________                Patient signature, Date Time: __________________________________________             Physician cosigner signature, Date, Time: ________________________________

## 2018-10-29 NOTE — PSYCH
Treatment Plan Tracking    # 1Treatment Plan not completed within required time limits due to: Treatment plan created verbally  PT will sign at the next session

## 2018-12-20 ENCOUNTER — OFFICE VISIT (OUTPATIENT)
Dept: FAMILY MEDICINE CLINIC | Facility: CLINIC | Age: 33
End: 2018-12-20
Payer: COMMERCIAL

## 2018-12-20 VITALS
DIASTOLIC BLOOD PRESSURE: 76 MMHG | HEART RATE: 64 BPM | SYSTOLIC BLOOD PRESSURE: 110 MMHG | HEIGHT: 68 IN | BODY MASS INDEX: 21.52 KG/M2 | WEIGHT: 142 LBS

## 2018-12-20 DIAGNOSIS — K21.9 GASTROESOPHAGEAL REFLUX DISEASE WITHOUT ESOPHAGITIS: ICD-10-CM

## 2018-12-20 DIAGNOSIS — J45.20 MILD INTERMITTENT ASTHMA WITHOUT COMPLICATION: ICD-10-CM

## 2018-12-20 DIAGNOSIS — Z23 NEED FOR INFLUENZA VACCINATION: ICD-10-CM

## 2018-12-20 DIAGNOSIS — R03.0 ELEVATED BLOOD PRESSURE READING: Primary | ICD-10-CM

## 2018-12-20 DIAGNOSIS — F41.1 GENERALIZED ANXIETY DISORDER: ICD-10-CM

## 2018-12-20 PROCEDURE — 90471 IMMUNIZATION ADMIN: CPT

## 2018-12-20 PROCEDURE — 99214 OFFICE O/P EST MOD 30 MIN: CPT | Performed by: FAMILY MEDICINE

## 2018-12-20 PROCEDURE — 90686 IIV4 VACC NO PRSV 0.5 ML IM: CPT

## 2018-12-20 PROCEDURE — 3008F BODY MASS INDEX DOCD: CPT | Performed by: FAMILY MEDICINE

## 2018-12-20 PROCEDURE — 1036F TOBACCO NON-USER: CPT | Performed by: FAMILY MEDICINE

## 2018-12-20 RX ORDER — OMEPRAZOLE 40 MG/1
40 CAPSULE, DELAYED RELEASE ORAL DAILY
Qty: 90 CAPSULE | Refills: 3 | Status: SHIPPED | OUTPATIENT
Start: 2018-12-20 | End: 2019-12-09

## 2018-12-20 RX ORDER — ESCITALOPRAM OXALATE 10 MG/1
10 TABLET ORAL DAILY
Qty: 90 TABLET | Refills: 1 | Status: SHIPPED | OUTPATIENT
Start: 2018-12-20 | End: 2019-08-03 | Stop reason: SDUPTHER

## 2018-12-20 NOTE — ASSESSMENT & PLAN NOTE
Patient's blood pressures at home with been elevated  In the office today they are quite good  Reviewed her old blood pressures, and most of those of also been excellent  Based on the fact that she is noting increased blood pressure in the evening, would recommend splitting the metoprolol to 1/2 in the morning, 1/2 in the evening  Re-evaluate in approximately 3 months  If she notices that it continues to be elevated, would consider formal diagnosis of hypertension, and consider increasing metoprolol or changing to other medications or adding other medications

## 2018-12-20 NOTE — PROGRESS NOTES
Assessment/Plan:    Anxiety disorder  Anxiety seems to be doing quite well at this point  Continue Lexapro  No changes  Asthma  Asthma is doing extremely well  She only uses the albuterol p r n     I did discuss with her about pneumonia vaccine  She declined today as she just received the flu vaccine, but will consider for the future  Elevated blood pressure reading  Patient's blood pressures at home with been elevated  In the office today they are quite good  Reviewed her old blood pressures, and most of those of also been excellent  Based on the fact that she is noting increased blood pressure in the evening, would recommend splitting the metoprolol to 1/2 in the morning, 1/2 in the evening  Re-evaluate in approximately 3 months  If she notices that it continues to be elevated, would consider formal diagnosis of hypertension, and consider increasing metoprolol or changing to other medications or adding other medications  GERD (gastroesophageal reflux disease)  Stable  Renewed omeprazole  Diagnoses and all orders for this visit:    Elevated blood pressure reading    Generalized anxiety disorder  -     escitalopram (LEXAPRO) 10 mg tablet; Take 1 tablet (10 mg total) by mouth daily for 180 days    Mild intermittent asthma without complication    Gastroesophageal reflux disease without esophagitis  -     omeprazole (PriLOSEC) 40 MG capsule; Take 1 capsule (40 mg total) by mouth daily for 180 days    Need for influenza vaccination  -     SYRINGE/SINGLE-DOSE VIAL: influenza vaccine, [id number], quadrivalent, 0 5 mL, preservative-free (AFLURIA, FLUARIX, FLULAVAL, FLUZONE)          Subjective: Follow up anxiety / depression, asthma, GERD  Pt notes that her BP has been elevated for the past week  She has been having tingling sensation in her neck which she attributes to the elevated BP  Flu immunization administered today  - [facility]     Patient ID: Jacky Chavez is a 26 y o  female  Patient is here to follow-up on Lexapro usage  Panic and anxiety of dramatically decreased while she is on the Lexapro  She did mention that occasionally she will have some symptoms at night, but they are not really problematic for her  She did mention that she notes her blood pressure to be elevated later in the day  She feels some changes during the day  Checks at night, and BP's are 140's/100's  She does take the metoprolol in the morning, does not have any problems taking it  Asthma: Has been doing well  No concerns  She has not had pneumovax  The following portions of the patient's history were reviewed and updated as appropriate: allergies, current medications, past family history, past medical history, past social history, past surgical history and problem list     Review of Systems   Constitutional: Negative  HENT: Negative  Respiratory: Negative  Cardiovascular: Negative  All other systems reviewed and are negative  Objective:      /76 (BP Location: Left arm, Patient Position: Sitting, Cuff Size: Large)   Pulse 64   Ht 5' 8" (1 727 m)   Wt 64 4 kg (142 lb)   BMI 21 59 kg/m²          Physical Exam   Constitutional: She appears well-developed and well-nourished  HENT:   Head: Normocephalic and atraumatic  Cardiovascular: Normal rate, regular rhythm and normal heart sounds  Pulses:       Carotid pulses are 2+ on the right side, and 2+ on the left side  Pulmonary/Chest: Effort normal and breath sounds normal  She has no wheezes  She has no rales  She exhibits no tenderness  Nursing note and vitals reviewed

## 2018-12-20 NOTE — PATIENT INSTRUCTIONS
Problem List Items Addressed This Visit        Digestive    GERD (gastroesophageal reflux disease)     Stable  Renewed omeprazole  Relevant Medications    omeprazole (PriLOSEC) 40 MG capsule       Respiratory    Asthma     Asthma is doing extremely well  She only uses the albuterol p r n     I did discuss with her about pneumonia vaccine  She declined today as she just received the flu vaccine, but will consider for the future  Other    Anxiety disorder     Anxiety seems to be doing quite well at this point  Continue Lexapro  No changes  Relevant Medications    escitalopram (LEXAPRO) 10 mg tablet    Elevated blood pressure reading - Primary     Patient's blood pressures at home with been elevated  In the office today they are quite good  Reviewed her old blood pressures, and most of those of also been excellent  Based on the fact that she is noting increased blood pressure in the evening, would recommend splitting the metoprolol to 1/2 in the morning, 1/2 in the evening  Re-evaluate in approximately 3 months  If she notices that it continues to be elevated, would consider formal diagnosis of hypertension, and consider increasing metoprolol or changing to other medications or adding other medications             Other Visit Diagnoses     Need for influenza vaccination        Relevant Orders    SYRINGE/SINGLE-DOSE VIAL: influenza vaccine, [id number], quadrivalent, 0 5 mL, preservative-free (AFLURIA, FLUARIX, FLULAVAL, FLUZONE) (Completed)

## 2018-12-20 NOTE — ASSESSMENT & PLAN NOTE
Asthma is doing extremely well  She only uses the albuterol p r n     I did discuss with her about pneumonia vaccine  She declined today as she just received the flu vaccine, but will consider for the future

## 2019-01-07 ENCOUNTER — OFFICE VISIT (OUTPATIENT)
Dept: FAMILY MEDICINE CLINIC | Facility: CLINIC | Age: 34
End: 2019-01-07
Payer: COMMERCIAL

## 2019-01-07 VITALS
WEIGHT: 148 LBS | HEIGHT: 68 IN | DIASTOLIC BLOOD PRESSURE: 62 MMHG | BODY MASS INDEX: 22.43 KG/M2 | SYSTOLIC BLOOD PRESSURE: 92 MMHG | HEART RATE: 72 BPM

## 2019-01-07 DIAGNOSIS — R03.0 ELEVATED BLOOD PRESSURE READING: Primary | ICD-10-CM

## 2019-01-07 PROCEDURE — 99213 OFFICE O/P EST LOW 20 MIN: CPT | Performed by: PHYSICIAN ASSISTANT

## 2019-01-07 PROCEDURE — 3008F BODY MASS INDEX DOCD: CPT | Performed by: PHYSICIAN ASSISTANT

## 2019-01-07 NOTE — ASSESSMENT & PLAN NOTE
BP improved back to the once daily usual dose of Toprol Xl 25 mg  Pt to watch her intake of salty foods as this may have been the holiday BP elevation reasoning

## 2019-01-07 NOTE — PROGRESS NOTES
Assessment/Plan:    Elevated blood pressure reading  BP improved back to the once daily usual dose of Toprol Xl 25 mg  Pt to watch her intake of salty foods as this may have been the holiday BP elevation reasoning  Diagnoses and all orders for this visit:    Elevated blood pressure reading          Subjective: Follow up re: HTN  Pt was taking 12 5 mg of Metoprolol BID but her reading were still up  She is currently taking 1 25 mg tablet and her numbers are better but she is still with the symptoms, such as flushing and feeling tight in her chest and neck  Her highest numbers are in the afternoon around 2-3pm  kw     Patient ID: Dina Goodpasture is a 26 y o  female  Patient here today to follow-up on her blood pressure readings  She did see Dr krish reno on the 20th of December with complaints of chest tightness and not feeling well with home blood pressures in the 130s over 90s  He had her start her metoprolol 25 mg bleeding and half instead of once daily to take it twice daily  She states that she did do this for period of a few weeks and it did not really seem to make a difference so she went to taking it once a day again in the morning and her blood pressure readings just the past week have come down into the 120s over 80s  She just wants to make sure that she is not missing upper body by doing the normal dosing again  She did admit to increase in weight with eating holiday foods that probably contained a lot more salt than she is used to taking in which likely did increase her blood pressure further short period of time in the past month  The following portions of the patient's history were reviewed and updated as appropriate: allergies, current medications, past family history, past medical history, past social history, past surgical history and problem list     Review of Systems   Constitutional: Negative  HENT: Negative  Eyes: Negative      Respiratory: Positive for chest tightness  Cardiovascular: Negative  Gastrointestinal: Negative  Endocrine: Negative  Genitourinary: Negative  Musculoskeletal: Negative  Skin: Negative  Allergic/Immunologic: Negative  Neurological: Negative  Hematological: Negative  Psychiatric/Behavioral: Negative  Objective:      BP 92/62 (BP Location: Left arm)   Pulse 72   Ht 5' 8" (1 727 m)   Wt 67 1 kg (148 lb)   BMI 22 50 kg/m²          Physical Exam   Constitutional: She is oriented to person, place, and time  She appears well-developed and well-nourished  No distress  HENT:   Head: Normocephalic and atraumatic  Eyes: Conjunctivae are normal  Right eye exhibits no discharge  Left eye exhibits no discharge  Neck: Neck supple  Carotid bruit is not present  Cardiovascular: Normal rate, regular rhythm and normal heart sounds  Exam reveals no gallop and no friction rub  No murmur heard  Pulmonary/Chest: Effort normal and breath sounds normal  No respiratory distress  She has no wheezes  She has no rales  Neurological: She is alert and oriented to person, place, and time  Skin: Skin is warm and dry  She is not diaphoretic  Psychiatric: She has a normal mood and affect  Judgment normal    Nursing note and vitals reviewed

## 2019-01-07 NOTE — PATIENT INSTRUCTIONS
Problem List Items Addressed This Visit        Other    Elevated blood pressure reading - Primary     BP improved back to the once daily usual dose of Toprol Xl 25 mg  Pt to watch her intake of salty foods as this may have been the holiday BP elevation reasoning

## 2019-01-14 ENCOUNTER — OFFICE VISIT (OUTPATIENT)
Dept: BEHAVIORAL/MENTAL HEALTH CLINIC | Facility: CLINIC | Age: 34
End: 2019-01-14
Payer: COMMERCIAL

## 2019-01-14 DIAGNOSIS — F41.1 GENERALIZED ANXIETY DISORDER: Primary | ICD-10-CM

## 2019-01-14 PROCEDURE — 90834 PSYTX W PT 45 MINUTES: CPT | Performed by: SOCIAL WORKER

## 2019-01-14 NOTE — PSYCH
Psychotherapy Provided: Individual Psychotherapy 45 minutes     Length of time in session: 45 minutes, follow up in 1 month    Goals addressed in session: Goal 1 and Goal 2     Pain:      none    0    Current suicide risk : Low     D- Winferd Karyna stated that she has been feeling more depressed due to the winter season  She also stated that she is having difficulty accepting her mother's boyfriend  She feels that her mother moved on too fast from her father  Processing her emotions and discussing ways for her to be able to communicate her feelings to her mother  Also discussing utilizing a light box and trying to get away one time during the winter season to help with the seasonal depression  Reviewing and signing her treatment plan  Giving supportive therapy  A- Progress - Continuing to process her emotions  P-Continue treatment    [address]: Diagnosis and Treatment Plan explained to Rosy Farrell relates understanding diagnosis and is agreeable to Treatment Plan   Yes

## 2019-01-16 ENCOUNTER — OFFICE VISIT (OUTPATIENT)
Dept: URGENT CARE | Facility: MEDICAL CENTER | Age: 34
End: 2019-01-16
Payer: COMMERCIAL

## 2019-01-16 VITALS
RESPIRATION RATE: 16 BRPM | OXYGEN SATURATION: 97 % | HEART RATE: 79 BPM | HEIGHT: 68 IN | WEIGHT: 140 LBS | SYSTOLIC BLOOD PRESSURE: 142 MMHG | TEMPERATURE: 98.5 F | BODY MASS INDEX: 21.22 KG/M2 | DIASTOLIC BLOOD PRESSURE: 96 MMHG

## 2019-01-16 DIAGNOSIS — J06.9 ACUTE URI: Primary | ICD-10-CM

## 2019-01-16 DIAGNOSIS — J02.9 SORE THROAT: ICD-10-CM

## 2019-01-16 LAB — S PYO AG THROAT QL: NEGATIVE

## 2019-01-16 PROCEDURE — S9088 SERVICES PROVIDED IN URGENT: HCPCS | Performed by: PHYSICIAN ASSISTANT

## 2019-01-16 PROCEDURE — 99213 OFFICE O/P EST LOW 20 MIN: CPT | Performed by: PHYSICIAN ASSISTANT

## 2019-01-16 PROCEDURE — 87430 STREP A AG IA: CPT | Performed by: PHYSICIAN ASSISTANT

## 2019-01-16 NOTE — PROGRESS NOTES
[address]        NAME: Kofi Jenkins is a [address] o  female  : 1985    MRN: [id number]  DATE: 2019  TIME: 4:17 PM    Assessment and Plan   Acute URI [J06 9]  1  Acute URI     2  Sore throat  POCT rapid strepA         Patient Instructions     Follow up with PCP in 3-5 days  Proceed to  ER if symptoms worsen  Chief Complaint     Chief Complaint   Patient presents with    Cold Like Symptoms     Patient relates has been sick for 3 days with a sore throat, cough, and congestion  Unsure of fever  Hoarness to voice  History of Present Illness       24-year-old female presents for 3 day complaint of cough, sore throat and congestion  Patient states her sore throat is worsening she has lost her voice  She is complaining of painful swallowing  She is not taking any over-the-counter medications  Review of Systems   Review of Systems   Constitutional: Negative  HENT: Positive for congestion and sore throat  Negative for dental problem, drooling, ear discharge, ear pain, facial swelling, hearing loss, mouth sores, nosebleeds, postnasal drip, rhinorrhea, sinus pain, sinus pressure, sneezing, tinnitus, trouble swallowing and voice change  Eyes: Negative  Respiratory: Positive for cough  Negative for apnea, choking, chest tightness, shortness of breath, wheezing and stridor  Gastrointestinal: Negative  Skin: Negative  Allergic/Immunologic: Negative            Current Medications       Current Outpatient Prescriptions:     albuterol (PROAIR HFA) 90 mcg/act inhaler, Inhale, Disp: , Rfl:     azelastine (ASTELIN) 0 1 % nasal spray, 2 sprays into each nostril as needed  , Disp: , Rfl:     cetirizine (ZyrTEC) 10 mg tablet, Take 1 tablet (10 mg total) by mouth daily (Patient taking differently: Take 10 mg by mouth as needed  ), Disp: 90 tablet, Rfl: 3    escitalopram (LEXAPRO) 10 mg tablet, Take 1 tablet (10 mg total) by mouth daily for 180 days, Disp: 90 tablet, Rfl: 1    metoprolol succinate (TOPROL-XL) 25 mg 24 hr tablet, Take 1 tablet (25 mg total) by mouth daily, Disp: 90 tablet, Rfl: 3    multivitamin (THERAGRAN) TABS, Take 1 tablet by mouth, Disp: , Rfl:     norgestimate-ethinyl estradiol (ORTHO-CYCLEN) 0 25-35 MG-MCG per tablet, Take 1 tablet by mouth daily, Disp: , Rfl:     omeprazole (PriLOSEC) 40 MG capsule, Take 1 capsule (40 mg total) by mouth daily for 180 days, Disp: 90 capsule, Rfl: 3    Current Allergies     Allergies as of 01/16/2019 - Reviewed 01/16/2019   Allergen Reaction Noted    Bee venom  02/06/2018    Codeine  07/10/2017    Oxycodone-acetaminophen Vomiting 03/25/2016            The following portions of the patient's history were reviewed and updated as appropriate: allergies, current medications, past family history, past medical history, past social history, past surgical history and problem list     Objective   /96   Pulse 79   Temp 98 5 °F (36 9 °C) (Tympanic)   Resp 16   Ht 5' 8" (1 727 m)   Wt 63 5 kg (140 lb)   LMP 01/15/2019   SpO2 97%   BMI 21 29 kg/m²        Physical Exam     Physical Exam   Constitutional: Vital signs are normal  She appears well-developed and well-nourished  No distress  HENT:   Head: Normocephalic and atraumatic  Right Ear: Hearing, tympanic membrane, external ear and ear canal normal    Left Ear: Hearing, tympanic membrane, external ear and ear canal normal    Nose: Nose normal  No mucosal edema or rhinorrhea  Right sinus exhibits no maxillary sinus tenderness and no frontal sinus tenderness  Left sinus exhibits no maxillary sinus tenderness and no frontal sinus tenderness  Mouth/Throat: Uvula is midline and mucous membranes are normal  Posterior oropharyngeal erythema present  No oropharyngeal exudate, posterior oropharyngeal edema or tonsillar abscesses  Eyes: Conjunctivae and lids are normal    Cardiovascular: Normal rate, regular rhythm and normal heart sounds      No murmur heard   Pulmonary/Chest: Effort normal and breath sounds normal  No respiratory distress  She has no decreased breath sounds  She has no wheezes  She has no rhonchi  She has no rales  Lymphadenopathy:        Head (right side): No submandibular and no tonsillar adenopathy present  Head (left side): No submandibular and no tonsillar adenopathy present  Skin: No rash noted  Nursing note and vitals reviewed

## 2019-01-16 NOTE — LETTER
January 16, 2019     Patient: Mary Penaloza   YOB: 1985   Date of Visit: 1/16/2019       To Whom It May Concern: It is my medical opinion that Edward Aleman may return to work on 01/17/2019  If you have any questions or concerns, please don't hesitate to call           Sincerely,        Rafiq Donaldson PA-C    CC: No Recipients

## 2019-01-16 NOTE — PATIENT INSTRUCTIONS
Rapid strep test is negative  Symptomatic treatment to include Tylenol or ibuprofen for pain or fever  Over-the-counter cough medication as needed  Cold Symptoms   WHAT YOU NEED TO KNOW:   A cold is an infection caused by a virus  The infection causes your upper respiratory system to become inflamed  Common symptoms of a cold include sneezing, dry throat, a stuffy nose, headache, watery eyes, and a cough  Your cough may be dry, or you may cough up mucus  You may also have muscle aches, joint pain, and tiredness  Rarely, you may have a fever  Most colds go away without treatment  DISCHARGE INSTRUCTIONS:   Return to the emergency department if:   · You have increased tiredness and weakness  · You are unable to eat  · Your heart is beating much faster than usual for you  · You see white spots in the back of your throat and your neck is swollen and sore to the touch  · You see pinpoint or larger reddish-purple dots on your skin  Contact your healthcare provider if:   · You have a fever higher than 102°F (38 9°C)  · You have new or worsening shortness of breath  · You have thick nasal drainage for more than 2 days  · Your symptoms do not improve or get worse within 5 days  · You have questions or concerns about your condition or care  Medicines: The following medicines may be suggested by your healthcare provider to decrease your cold symptoms  These medicines are available without a doctor's order  Ask which medicines to take and when to take them  Follow directions  · NSAIDs or acetaminophen  help to bring down a fever or decrease pain  · Decongestants  help decrease nasal stuffiness  · Antihistamines  help decrease sneezing and a runny nose  · Cough suppressants  help decrease how much you cough  · Expectorants  help loosen mucus so you can cough it up  · Take your medicine as directed    Contact your healthcare provider if you think your medicine is not helping or if you have side effects  Tell him of her if you are allergic to any medicine  Keep a list of the medicines, vitamins, and herbs you take  Include the amounts, and when and why you take them  Bring the list or the pill bottles to follow-up visits  Carry your medicine list with you in case of an emergency  Symptom relief: The following may help relieve cold symptoms, such as a dry throat and congestion:  · Gargle with mouthwash or warm salt water as directed  · Suck on throat lozenges or hard candy  · Use a cold or warm vaporizer or humidifier to ease your breathing  · Rest for at least 2 days and then as needed to decrease tiredness and weakness  · Use petroleum based jelly around your nostrils to decrease irritation from blowing your nose  Drink liquids:  Liquids will help thin and loosen thick mucus so you can cough it up  Liquids will also keep you hydrated  Ask your healthcare provider which liquids are best for you and how much to drink each day  Prevent the spread of germs: You can spread your cold germs to others for at least 3 days after your symptoms start  Wash your hands often  Do not share items, such as eating utensils  Cover your nose and mouth when you cough or sneeze using the crook of your elbow instead of your hands  Throw used tissues in the garbage  Do not smoke:  Smoking may worsen your symptoms and increase the length of time you feel sick  Talk with your healthcare provider if you need help to stop smoking  Follow up with your healthcare provider as directed:  Write down your questions so you remember to ask them during your visits  © 2017 [address]  Information is for End User's use only and may not be sold, redistributed or otherwise used for commercial purposes  All illustrations and images included in CareNotes® are the copyrighted property of [employer]  or Alan Hines  The above information is an  only   It is not intended as medical advice for individual conditions or treatments  Talk to your doctor, nurse or pharmacist before following any medical regimen to see if it is safe and effective for you

## 2019-02-11 ENCOUNTER — OFFICE VISIT (OUTPATIENT)
Dept: BEHAVIORAL/MENTAL HEALTH CLINIC | Facility: CLINIC | Age: 34
End: 2019-02-11
Payer: COMMERCIAL

## 2019-02-11 DIAGNOSIS — F41.1 GENERALIZED ANXIETY DISORDER: Primary | ICD-10-CM

## 2019-02-11 PROCEDURE — 90834 PSYTX W PT 45 MINUTES: CPT | Performed by: SOCIAL WORKER

## 2019-02-11 NOTE — PSYCH
Anahi Varela Marlon Rick  1985       Date of Initial Treatment Plan: 10/29/18   Date of Current Treatment Plan: 02/11/19    Treatment Plan Number  2     Strengths/Personal Resources for Self Care: Resilient, creative        Diagnosis:   No diagnosis found  Area of Needs:   Grief/loss  Stress        Long Term Goal 1: Be able to cope with the losses           Target Date: 5/11/19  Completion Date: TBD         Short Term Objectives for Goal 1:       Be able to talk about it      Do not minimize what I am feeling      Use my support system      Long Term Goal 2: Be able to manage my stress        Target Date: 5/11/19  Completion Date: TBD    Short Term Objectives for Goal 2:   Set boundaries with others  Be able to identify my triggers  Use healthy coping mechanisms for stress               GOAL 1: Modality: Individual 1-2x per month   Completion Date TBD and The person(s) responsible for carrying out the plan is  Kaitlynn Ly    GOAL 2: Modality: Individual 1-2x per month   Completion Date TBD and The person(s) responsible for carrying out the plan is  [employer]: Diagnosis and Treatment Plan explained to Chaim Ibarra relates understanding diagnosis and is agreeable to Treatment Plan         Client Comments : Please share your thoughts, feelings, need and/or experiences regarding your treatment plan:       __________________________________________________________________    __________________________________________________________________    __________________________________________________________________    __________________________________________________________________    _______________________________________                Patient signature, Date Time: __________________________________________             Physician cosigner signature, Date, Time: ________________________________

## 2019-02-11 NOTE — PSYCH
Treatment Plan Tracking    # 2Treatment Plan not completed within required time limits due to: Treatment plan created verbally, PT will sign at her next session

## 2019-02-20 DIAGNOSIS — K21.9 GASTROESOPHAGEAL REFLUX DISEASE WITHOUT ESOPHAGITIS: ICD-10-CM

## 2019-02-20 RX ORDER — OMEPRAZOLE 40 MG/1
40 CAPSULE, DELAYED RELEASE ORAL DAILY
Qty: 90 CAPSULE | Refills: 1 | Status: SHIPPED | OUTPATIENT
Start: 2019-02-20 | End: 2019-12-09 | Stop reason: SDUPTHER

## 2019-03-08 ENCOUNTER — TELEPHONE (OUTPATIENT)
Dept: PSYCHIATRY | Facility: CLINIC | Age: 34
End: 2019-03-08

## 2019-03-19 DIAGNOSIS — R00.2 HEART PALPITATIONS: ICD-10-CM

## 2019-03-19 RX ORDER — METOPROLOL SUCCINATE 25 MG/1
25 TABLET, EXTENDED RELEASE ORAL DAILY
Qty: 90 TABLET | Refills: 3 | Status: SHIPPED | OUTPATIENT
Start: 2019-03-19 | End: 2020-04-06 | Stop reason: SDUPTHER

## 2019-04-10 ENCOUNTER — TELEPHONE (OUTPATIENT)
Dept: FAMILY MEDICINE CLINIC | Facility: CLINIC | Age: 34
End: 2019-04-10

## 2019-05-06 ENCOUNTER — OFFICE VISIT (OUTPATIENT)
Dept: BEHAVIORAL/MENTAL HEALTH CLINIC | Facility: CLINIC | Age: 34
End: 2019-05-06
Payer: COMMERCIAL

## 2019-05-06 DIAGNOSIS — F41.1 GENERALIZED ANXIETY DISORDER: Primary | ICD-10-CM

## 2019-05-06 DIAGNOSIS — F33.0 MILD EPISODE OF RECURRENT MAJOR DEPRESSIVE DISORDER (HCC): ICD-10-CM

## 2019-05-06 PROCEDURE — 90834 PSYTX W PT 45 MINUTES: CPT | Performed by: SOCIAL WORKER

## 2019-07-08 ENCOUNTER — SOCIAL WORK (OUTPATIENT)
Dept: BEHAVIORAL/MENTAL HEALTH CLINIC | Facility: CLINIC | Age: 34
End: 2019-07-08

## 2019-07-08 DIAGNOSIS — F41.1 GENERALIZED ANXIETY DISORDER: Primary | ICD-10-CM

## 2019-07-08 PROCEDURE — 90834 PSYTX W PT 45 MINUTES: CPT | Performed by: SOCIAL WORKER

## 2019-07-08 NOTE — PSYCH
Psychotherapy Provided: Individual Psychotherapy 45 minutes     Length of time in session: 45 minutes, follow up in 1 month    Goals addressed in session: Goal 1 and Goal 2     Pain:      none    0    Current suicide risk : Low     D- Cody Gross presented for treatment as upset and tearful  She stated that things have been very stressful since her last session  She shared that they had to put their dog down of 15 years  She also shared that her job has been very stressful and that she has been having some issues with a coworker  She also shared that she has felt extremely depressed and has not had the desire to get out of bed although she has despite feeling this way  Discussing her feelings and the triggers that are involved  Discussing grief as a theme in her life and ways to recognized when and how she is being triggered  Also discussing use of healthy coping mechanisms for the stress in her life  She shared that on a positive note, they adopted a rescue dog which has helped with healing from her loss  reviewing and renewing her treatment plan  Giving supportive therapy  A- Progress - Continuing to process her emotions  P- Continue treatment    Behavioral Health Treatment Plan [facility]: Diagnosis and Treatment Plan explained to Shayla Sims relates understanding diagnosis and is agreeable to Treatment Plan   Yes

## 2019-08-03 DIAGNOSIS — F41.1 GENERALIZED ANXIETY DISORDER: ICD-10-CM

## 2019-08-03 RX ORDER — ESCITALOPRAM OXALATE 10 MG/1
10 TABLET ORAL DAILY
Qty: 90 TABLET | Refills: 1 | Status: SHIPPED | OUTPATIENT
Start: 2019-08-03 | End: 2019-09-03 | Stop reason: SDUPTHER

## 2019-08-12 ENCOUNTER — SOCIAL WORK (OUTPATIENT)
Dept: BEHAVIORAL/MENTAL HEALTH CLINIC | Facility: CLINIC | Age: 34
End: 2019-08-12

## 2019-08-12 DIAGNOSIS — F41.1 GENERALIZED ANXIETY DISORDER: Primary | ICD-10-CM

## 2019-08-12 PROCEDURE — 90834 PSYTX W PT 45 MINUTES: CPT | Performed by: SOCIAL WORKER

## 2019-08-12 NOTE — PSYCH
Treatment Plan Tracking    # 3Treatment Plan not completed within required time limits due to: Treatment plan created verbally  Will be signed at her next session

## 2019-08-12 NOTE — PSYCH
Psychotherapy Provided: Individual Psychotherapy 45 minutes     Length of time in session: 45 minutes, follow up in 2 week    Goals addressed in session: Goal 1 and Goal 2     Pain:      none    0    Current suicide risk : Low     D- cristy stated that she is very tired  She stated that she worked many hours last week  She also stated that work has been very stressful due to ongoing drama between her coworkers  In addition, she stated that her maternal grandmother is very ill and her 's grandmother is as well  Processing her emotions and discussing ways for her to cope with her grief  Also discussing ways for her to cope with the stress at work  Giving supportive therapy  A- Progress - Continuing to process her emotions  P-Continue treatment    [address]: Diagnosis and Treatment Plan explained to Lisha Crabtree relates understanding diagnosis and is agreeable to Treatment Plan   Yes

## 2019-08-12 NOTE — BH TREATMENT PLAN
Jeronimo Montero Svrcek  1985       Date of Initial Treatment Plan: 10/29/18   Date of Current Treatment Plan: 08/12/19    Treatment Plan Number 3     Strengths/Personal Resources for Self Care: Resilient, creative           Diagnosis:   No diagnosis found      Area of Needs:   Grief/loss  Stress           Long Term Goal 1: Be able to cope with the losses              Target Date: 5/11/19  Completion Date: TBD         Short Term Objectives for Goal 1:       Be able to talk about it      Do not minimize what I am feeling      Use my support system        Long Term Goal 2: Be able to manage my stress           Target Date: 5/11/19  Completion Date: TBD     Short Term Objectives for Goal 2:   Set boundaries with others  Be able to identify my triggers  Use healthy coping mechanisms for stress                 GOAL 1: Modality: Individual 1-2x per month   Completion Date TBD and The person(s) responsible for carrying out the plan is  Elodia Figueroa     GOAL 2: Modality: Individual 1-2x per month   Completion Date TBD and The person(s) responsible for carrying out the plan is  [address] [facility] Treatment Plan [facility]: Diagnosis and Treatment Plan explained to Bay Granados relates understanding diagnosis and is agreeable to Treatment Plan         Client Comments : Please share your thoughts, feelings, need and/or experiences regarding your treatment plan: None

## 2019-09-03 ENCOUNTER — TELEPHONE (OUTPATIENT)
Dept: FAMILY MEDICINE CLINIC | Facility: CLINIC | Age: 34
End: 2019-09-03

## 2019-09-03 DIAGNOSIS — F41.1 GENERALIZED ANXIETY DISORDER: ICD-10-CM

## 2019-09-04 RX ORDER — ESCITALOPRAM OXALATE 10 MG/1
10 TABLET ORAL DAILY
Qty: 90 TABLET | Refills: 0 | Status: SHIPPED | OUTPATIENT
Start: 2019-09-04 | End: 2019-12-09 | Stop reason: SDUPTHER

## 2019-09-16 ENCOUNTER — SOCIAL WORK (OUTPATIENT)
Dept: BEHAVIORAL/MENTAL HEALTH CLINIC | Facility: CLINIC | Age: 34
End: 2019-09-16

## 2019-09-16 DIAGNOSIS — F33.0 MILD EPISODE OF RECURRENT MAJOR DEPRESSIVE DISORDER (HCC): ICD-10-CM

## 2019-09-16 DIAGNOSIS — F41.1 GENERALIZED ANXIETY DISORDER: Primary | ICD-10-CM

## 2019-09-16 PROCEDURE — 90834 PSYTX W PT 45 MINUTES: CPT | Performed by: SOCIAL WORKER

## 2019-09-17 NOTE — PSYCH
Treatment Plan Tracking    # 3Treatment Plan not completed within required time limits due to: Treatment note not signed due to technical difficulties

## 2019-09-17 NOTE — PSYCH
Psychotherapy Provided: Individual Psychotherapy 45 minutes     Length of time in session: 45 minutes, follow up in 1 month    Goals addressed in session: Goal 1 and Goal 2     Pain:      none    0    Current suicide risk : Low     ADDY Vance stated that things have been very stressful over the last month  She stated that her maternal grandmother and her 's grandmother passed away in the same week  She stated that her employer was less than empathetic regarding this situation  She also stated that the dysfunction in the work place increased  She discussed that she was seeking other job opportunities and was able to begin a position that she thought would start later  She stated that after she gave her two weeks notice they dismissed her  Processing her emotions and discussing the dysfunction of the situation that she left  Also discussing her new opportunity and the positive aspects of it  Processing her grief an discussing ways for her to cope with her losses  Giving supportive therapy  A- progress - Josh Vance presented as somewhat stressed but upbeat  She continues to work towards completing her treatment goals  P-Continue treatment    Behavioral Health Treatment Plan [facility]: Diagnosis and Treatment Plan explained to Isaac Layne relates understanding diagnosis and is agreeable to Treatment Plan   Yes

## 2019-11-11 ENCOUNTER — SOCIAL WORK (OUTPATIENT)
Dept: BEHAVIORAL/MENTAL HEALTH CLINIC | Facility: CLINIC | Age: 34
End: 2019-11-11

## 2019-11-11 DIAGNOSIS — F41.1 GENERALIZED ANXIETY DISORDER: Primary | ICD-10-CM

## 2019-11-11 PROCEDURE — 90834 PSYTX W PT 45 MINUTES: CPT | Performed by: SOCIAL WORKER

## 2019-11-11 NOTE — PSYCH
Psychotherapy Provided: Individual Psychotherapy 45 minutes     Length of time in session: 45 minutes, follow up in 2 month    Goals addressed in session: Goal 1 and Goal 2     Pain:      none    0    Current suicide risk : Low     D- Herve Clarke stated that she has had a very difficult month  She stated that her 's grandmother passed away as well as her dog  She also stated that they were away for a wedding over the weekend which was somewhat stressful  In addition, she stated that her mother is selling her childhood home and being nasty towards her and her sister  She stated that this is typical behavior for her mother  Processing her emotions and discussing ways for her to cope with the recent losses and stressors in her life  Giving supportive therapy  A- Progress- Herve Clarke continues to work towards completing her treatment goals  P-Continue treatment    Behavioral Health Treatment Plan [facility]: Diagnosis and Treatment Plan explained to Radha Espinosa relates understanding diagnosis and is agreeable to Treatment Plan   Yes

## 2019-12-09 ENCOUNTER — OFFICE VISIT (OUTPATIENT)
Dept: FAMILY MEDICINE CLINIC | Facility: CLINIC | Age: 34
End: 2019-12-09

## 2019-12-09 VITALS
BODY MASS INDEX: 21.07 KG/M2 | WEIGHT: 139 LBS | HEIGHT: 68 IN | SYSTOLIC BLOOD PRESSURE: 104 MMHG | HEART RATE: 80 BPM | DIASTOLIC BLOOD PRESSURE: 74 MMHG

## 2019-12-09 DIAGNOSIS — K58.0 IRRITABLE BOWEL SYNDROME WITH DIARRHEA: ICD-10-CM

## 2019-12-09 DIAGNOSIS — F41.1 GENERALIZED ANXIETY DISORDER: Primary | ICD-10-CM

## 2019-12-09 DIAGNOSIS — J45.20 MILD INTERMITTENT ASTHMA WITHOUT COMPLICATION: ICD-10-CM

## 2019-12-09 DIAGNOSIS — K21.9 GASTROESOPHAGEAL REFLUX DISEASE WITHOUT ESOPHAGITIS: ICD-10-CM

## 2019-12-09 PROCEDURE — 99214 OFFICE O/P EST MOD 30 MIN: CPT | Performed by: FAMILY MEDICINE

## 2019-12-09 RX ORDER — ESCITALOPRAM OXALATE 10 MG/1
10 TABLET ORAL DAILY
Qty: 90 TABLET | Refills: 1 | Status: SHIPPED | OUTPATIENT
Start: 2019-12-09 | End: 2020-06-08

## 2019-12-09 RX ORDER — OMEPRAZOLE 40 MG/1
40 CAPSULE, DELAYED RELEASE ORAL DAILY
Qty: 90 CAPSULE | Refills: 1 | Status: SHIPPED | OUTPATIENT
Start: 2019-12-09 | End: 2022-04-22

## 2019-12-09 NOTE — ASSESSMENT & PLAN NOTE
Stable at the moment  Continue on Lexapro  Renewed prescription  Follow in 6 months  Negative SI, positive contract

## 2019-12-09 NOTE — ASSESSMENT & PLAN NOTE
Based on patient's descriptions, she does seem to have IBS with diarrhea  Would recommend consider Levsin, 1 every 4-6 hours as needed  This would be only as needed  Send in prescription for SL version

## 2019-12-09 NOTE — PATIENT INSTRUCTIONS
Problem List Items Addressed This Visit     Anxiety disorder - Primary     Stable at the moment  Continue on Lexapro  Renewed prescription  Follow in 6 months  Negative SI, positive contract  Relevant Medications    escitalopram (LEXAPRO) 10 mg tablet    Asthma     Stable at the moment  Continue on as needed inhaler  Of note, I would recommend the patient have Pneumovax  She will check her records, and then if it is over 10 years she will do that in the future  GERD (gastroesophageal reflux disease)     Stable with omeprazole  She does still have some symptoms at times  Tums as needed  Relevant Medications    omeprazole (PriLOSEC) 40 MG capsule    hyoscyamine (LEVSIN/SL) 0 125 mg SL tablet    Irritable bowel syndrome with diarrhea     Based on patient's descriptions, she does seem to have IBS with diarrhea  Would recommend consider Levsin, 1 every 4-6 hours as needed  This would be only as needed  Send in prescription for SL version           Relevant Medications    hyoscyamine (LEVSIN/SL) 0 125 mg SL tablet

## 2019-12-09 NOTE — PROGRESS NOTES
Assessment and Plan:    Problem List Items Addressed This Visit     Anxiety disorder - Primary     Stable at the moment  Continue on Lexapro  Renewed prescription  Follow in 6 months  Negative SI, positive contract  Relevant Medications    escitalopram (LEXAPRO) 10 mg tablet    Asthma     Stable at the moment  Continue on as needed inhaler  Of note, I would recommend the patient have Pneumovax  She will check her records, and then if it is over 10 years she will do that in the future  GERD (gastroesophageal reflux disease)     Stable with omeprazole  She does still have some symptoms at times  Tums as needed  Relevant Medications    omeprazole (PriLOSEC) 40 MG capsule    hyoscyamine (LEVSIN/SL) 0 125 mg SL tablet    Irritable bowel syndrome with diarrhea     Based on patient's descriptions, she does seem to have IBS with diarrhea  Would recommend consider Levsin, 1 every 4-6 hours as needed  This would be only as needed  Send in prescription for SL version  Relevant Medications    hyoscyamine (LEVSIN/SL) 0 125 mg SL tablet                 Diagnoses and all orders for this visit:    Generalized anxiety disorder  -     escitalopram (LEXAPRO) 10 mg tablet; Take 1 tablet (10 mg total) by mouth daily    Gastroesophageal reflux disease without esophagitis  -     omeprazole (PriLOSEC) 40 MG capsule; Take 1 capsule (40 mg total) by mouth daily    Mild intermittent asthma without complication    Irritable bowel syndrome with diarrhea  -     hyoscyamine (LEVSIN/SL) 0 125 mg SL tablet; Take 1 tablet (0 125 mg total) by mouth every 6 (six) hours as needed for cramping or diarrhea              Subjective:      Patient ID: Zulma Castro is a 37 y o  female  CC:    Chief Complaint   Patient presents with    Follow-up    Anxiety       HPI:    Patient is here to follow-up on multiple issues      With regard to anxiety, she has really not had any problems including panic attacks recently  She does have some minor underlying symptoms, but the Lexapro seems to be keeping things in check for the most part  With regard to her asthma, she reports she is doing relatively well  Denies any particular problems  Patient does have ProAir  She used the last time maybe a month ago  She has not needed any other inhalers in the meantime  GERD: Omeprazole  Seems to help  IBS: She did try to change diet  When diet returned, it is more issues  She is not trying medications  Gluten limited  The following portions of the patient's history were reviewed and updated as appropriate: allergies, current medications, past family history, past medical history, past social history, past surgical history and problem list       Review of Systems   Constitutional: Negative  HENT: Negative  Eyes: Negative  Respiratory: Negative  Cardiovascular: Negative  Gastrointestinal: Positive for diarrhea  Endocrine: Negative  Genitourinary: Negative  Musculoskeletal: Negative  Skin: Negative  Allergic/Immunologic: Negative  Neurological: Negative  Hematological: Negative  Psychiatric/Behavioral: Negative  Negative for suicidal ideas  Data to review:       Objective:    Vitals:    12/09/19 1303   BP: 104/74   Pulse: 80   Weight: 63 kg (139 lb)   Height: 5' 8" (1 727 m)        Physical Exam   Constitutional: She appears well-developed and well-nourished  HENT:   Head: Normocephalic and atraumatic  Cardiovascular: Normal rate, regular rhythm and normal heart sounds  Pulses:       Carotid pulses are 2+ on the right side, and 2+ on the left side  Pulmonary/Chest: Effort normal and breath sounds normal  She has no wheezes  She has no rales  She exhibits no tenderness  Nursing note and vitals reviewed

## 2019-12-09 NOTE — ASSESSMENT & PLAN NOTE
Stable at the moment  Continue on as needed inhaler  Of note, I would recommend the patient have Pneumovax  She will check her records, and then if it is over 10 years she will do that in the future

## 2019-12-10 ENCOUNTER — TELEPHONE (OUTPATIENT)
Dept: ADMINISTRATIVE | Facility: OTHER | Age: 34
End: 2019-12-10

## 2019-12-10 NOTE — TELEPHONE ENCOUNTER
----- Message from Valentine Boas, MD sent at 12/9/2019  1:29 PM EST -----  Regarding: GYN  EDEN Maldonado    Had GYN in 2018 at [facility]  Pap result, please!!     Thanks!!

## 2020-04-06 DIAGNOSIS — R00.2 HEART PALPITATIONS: ICD-10-CM

## 2020-04-06 RX ORDER — METOPROLOL SUCCINATE 25 MG/1
25 TABLET, EXTENDED RELEASE ORAL DAILY
Qty: 90 TABLET | Refills: 3 | Status: SHIPPED | OUTPATIENT
Start: 2020-04-06 | End: 2021-04-15

## 2020-06-05 DIAGNOSIS — F41.1 GENERALIZED ANXIETY DISORDER: ICD-10-CM

## 2020-06-08 RX ORDER — ESCITALOPRAM OXALATE 10 MG/1
TABLET ORAL
Qty: 90 TABLET | Refills: 1 | Status: SHIPPED | OUTPATIENT
Start: 2020-06-08 | End: 2020-12-14

## 2020-09-02 ENCOUNTER — OFFICE VISIT (OUTPATIENT)
Dept: URGENT CARE | Facility: MEDICAL CENTER | Age: 35
End: 2020-09-02
Payer: COMMERCIAL

## 2020-09-02 VITALS
TEMPERATURE: 98.9 F | BODY MASS INDEX: 22.73 KG/M2 | SYSTOLIC BLOOD PRESSURE: 146 MMHG | HEART RATE: 74 BPM | RESPIRATION RATE: 16 BRPM | WEIGHT: 150 LBS | HEIGHT: 68 IN | OXYGEN SATURATION: 96 % | DIASTOLIC BLOOD PRESSURE: 88 MMHG
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
## 2020-09-02 PROCEDURE — 12001 RPR S/N/AX/GEN/TRNK 2.5CM/<: CPT | Performed by: PHYSICIAN ASSISTANT

## 2020-09-02 PROCEDURE — G0382 LEV 3 HOSP TYPE B ED VISIT: HCPCS | Performed by: PHYSICIAN ASSISTANT

## 2020-09-02 RX ORDER — AMOXICILLIN AND CLAVULANATE POTASSIUM 875; 125 MG/1; MG/1
1 TABLET, FILM COATED ORAL EVERY 12 HOURS SCHEDULED
Qty: 14 TABLET | Refills: 0 | Status: SHIPPED | OUTPATIENT
Start: 2020-09-02 | End: 2020-09-09

## 2020-09-03 NOTE — PROGRESS NOTES
[facility]        NAME: Siri Noland is a 38 y o  female  : 1985    MRN: [id number]  DATE: 2020  TIME: 11:38 PM    Assessment and Plan   Dog bite, initial encounter [W54  0XXA]  1  Dog bite, initial encounter  amoxicillin-clavulanate (AUGMENTIN) 875-125 mg per tablet    Laceration repair         Patient Instructions     Small, 0 25 cm puncture wound was closed in office with one suture  Patient tolerated procedure well without difficulty  Patient was prescribed augmentin  Advised to take in entirety  One tablet, twice daily (every 12 hours) for the next 7 days  Take with food to prevent stomach upset  TDap UTD  Advised to monitor for signs of increasing infection  Take tylenol or ibuprofen for pain  Ice the area for swelling and pain  Follow up with PCP in 3-5 days  Proceed to  ER if symptoms worsen  Chief Complaint     Chief Complaint   Patient presents with    Dog Bite     Patient presents with a dog bite on her right wrist that happened around 9 pm  She notes that she moved suddenly and the dog bit her  History of Present Illness       UTD with tetanus  Denies numbness, tingling, decreased ROM, drainage from wound, redness, red streaking, fevers, chills  Dog is her friend's dog  UTD with vaccines  Dog Bite    The incident occurred just prior to arrival  Incident location: at friend's house  She came to the ER via personal transport  There is an injury to the right wrist  Pertinent negatives include no numbness  She is right-handed  She has received no recent medical care  Review of Systems   Review of Systems   Constitutional: Negative for chills and fever  Skin: Positive for wound (positive for puncture wound of the right wrist)  Negative for color change, pallor and rash  Neurological: Negative for numbness           Current Medications       Current Outpatient Medications:     albuterol (PROAIR HFA) 90 mcg/act inhaler, Inhale, Disp: , Rfl:   azelastine (ASTELIN) 0 1 % nasal spray, 2 sprays into each nostril as needed  , Disp: , Rfl:     escitalopram (LEXAPRO) 10 mg tablet, TAKE ONE TABLET BY MOUTH EVERY DAY, Disp: 90 tablet, Rfl: 1    metoprolol succinate (TOPROL-XL) 25 mg 24 hr tablet, Take 1 tablet (25 mg total) by mouth daily, Disp: 90 tablet, Rfl: 3    multivitamin (THERAGRAN) TABS, Take 1 tablet by mouth, Disp: , Rfl:     amoxicillin-clavulanate (AUGMENTIN) 875-125 mg per tablet, Take 1 tablet by mouth every 12 (twelve) hours for 7 days, Disp: 14 tablet, Rfl: 0    cetirizine (ZyrTEC) 10 mg tablet, Take 1 tablet (10 mg total) by mouth daily (Patient taking differently: Take 10 mg by mouth as needed  ), Disp: 90 tablet, Rfl: 3    hyoscyamine (LEVSIN/SL) 0 125 mg SL tablet, Take 1 tablet (0 125 mg total) by mouth every 6 (six) hours as needed for cramping or diarrhea, Disp: 60 tablet, Rfl: 5    omeprazole (PriLOSEC) 40 MG capsule, Take 1 capsule (40 mg total) by mouth daily, Disp: 90 capsule, Rfl: 1    Current Allergies     Allergies as of 09/02/2020 - Reviewed 09/02/2020   Allergen Reaction Noted    Bee venom  02/06/2018    Codeine  07/10/2017    Oxycodone-acetaminophen Vomiting 03/25/2016            The following portions of the patient's history were reviewed and updated as appropriate: allergies, current medications, past family history, past medical history, past social history, past surgical history and problem list      Past Medical History:   Diagnosis Date    Anxiety     Depression     Eating disorder        Past Surgical History:   Procedure Laterality Date    WISDOM TOOTH EXTRACTION         Family History   Problem Relation Age of Onset    Irregular heart beat Mother         Proxysmal Atrial Tachycardia, PVC    Asthma Father     Hyperlipidemia Father     Hypertension Father     Anxiety disorder Father     Prostate cancer Paternal Grandfather          Medications have been verified          Objective   /88 Pulse 74   Temp 98 9 °F (37 2 °C) (Tympanic)   Resp 16   Ht 5' 8" (1 727 m)   Wt 68 kg (150 lb)   SpO2 96%   BMI 22 81 kg/m²          Physical Exam     Physical Exam  Vitals signs and nursing note reviewed  Constitutional:       General: She is not in acute distress  Appearance: Normal appearance  She is not ill-appearing or toxic-appearing  Musculoskeletal:      Right wrist: She exhibits normal range of motion, no tenderness, no bony tenderness, no swelling and no effusion  Skin:     General: Skin is warm  Findings: Wound (positive for puncture wound to the right wrist  Flexor tendon exposed  Pt has TTP over wound  Actively bleeding  Sensation intact  Radial pulse +2 b/l  ) present  Comments: No surrounding erythema or edema noted  Multiple superficial excoriations noted to the radial aspect of the dorsal wrist      Neurological:      General: No focal deficit present  Mental Status: She is alert and oriented to person, place, and time  Psychiatric:         Mood and Affect: Mood normal          Behavior: Behavior normal          Laceration repair    Date/Time: 9/2/2020 11:36 PM  Performed by: Kin Gonzalez PA-C  Authorized by: Kin Gonzalez PA-C   Consent: Verbal consent obtained  Consent given by: patient  Patient identity confirmed: verbally with patient  Body area: upper extremity  Location details: right wrist  Laceration length: 0 3 cm  Foreign bodies: no foreign bodies  Tendon involvement: superficial  Nerve involvement: none  Vascular damage: no  Anesthesia: local infiltration    Anesthesia:  Local Anesthetic: lidocaine 1% without epinephrine    Wound Dehiscence:  Superficial Wound Dehiscence: simple closure      Procedure Details:  Preparation: Patient was prepped and draped in the usual sterile fashion    Irrigation solution: saline  Irrigation method: syringe  Amount of cleaning: standard  Debridement: none  Degree of undermining: none  Skin closure: 4-0 nylon  Number of sutures: 1  Technique: simple  Approximation: close  Approximation difficulty: simple  Dressing: 4x4 sterile gauze and antibiotic ointment  Patient tolerance: Patient tolerated the procedure well with no immediate complications

## 2020-09-03 NOTE — PATIENT INSTRUCTIONS
Animal Bite   WHAT YOU NEED TO KNOW:   Animal bite injuries range from shallow cuts to deep, life-threatening wounds  An animal can cut or puncture the skin when it bites  Your skin may be torn from your body  Your skin may swell or bruise even if the bite does not break the skin  Animal bites occur more often on the hands, arms, legs, and face  Bites from dogs and cats are the most common injuries  DISCHARGE INSTRUCTIONS:   Return to the emergency department if:   · You have a fever  · Your wound is red, swollen, and draining pus  · You see red streaks on the skin around the wound  · You can no longer move the bitten area  · Your heartbeat and breathing are much faster than usual     · You feel dizzy and confused  Contact your healthcare provider if:   · Your pain does not get better, even after you take pain medicine  · You have nightmares or flashbacks about the animal bite  · You have questions or concerns about your condition or care  Medicines: You may need any of the following:  · Antibiotics  prevent or treat a bacterial infection  · Prescription pain medicine  may be given  Ask how to take this medicine safely  · A tetanus vaccine  may be needed to prevent tetanus  Tetanus is a life-threatening bacterial infection that affects the nerves and muscles  The bacteria can be spread through animal bites  · A rabies vaccine  may be needed to prevent rabies  Rabies is a life-threatening viral infection  The virus can be spread through animal bites  · Take your medicine as directed  Contact your healthcare provider if you think your medicine is not helping or if you have side effects  Tell him of her if you are allergic to any medicine  Keep a list of the medicines, vitamins, and herbs you take  Include the amounts, and when and why you take them  Bring the list or the pill bottles to follow-up visits  Carry your medicine list with you in case of an emergency    Follow up with your healthcare provider in 1 to 2 days: You may need to return to have your stitches removed  Write down your questions so you remember to ask them during your visits  Self-care:   · Apply antibiotic ointment as directed  This helps prevent infection in minor skin wounds  It is available without a doctor's order  · Keep the wound clean and covered  Wash the wound every day with soap and water or germ-killing cleanser  Ask your healthcare provider about the kinds of bandages to use  · Apply ice on your wound  Ice helps decrease swelling and pain  Ice may also help prevent tissue damage  Use an ice pack, or put crushed ice in a plastic bag  Cover it with a towel and place it on your wound for 15 to 20 minutes every hour or as directed  · Elevate the wound area  Raise your wound above the level of your heart as often as you can  This will help decrease swelling and pain  Prop your wound on pillows or blankets to keep it elevated comfortably  Prevent another animal bite:   · Learn to recognize the signs of a scared or angry pet  Avoid quick, sudden movements  · Do not step between animals that are fighting  · Do not leave a pet alone with a young child  · Do not disturb an animal while it eats, sleeps, or cares for its young  · Do not approach an animal you do not know, especially one that is tied up or caged  · Stay away from animals that seem sick or act strangely  · Do not feed or capture wild animals  © 2017 [address] [employer] Information is for End User's use only and may not be sold, redistributed or otherwise used for commercial purposes  All illustrations and images included in CareNotes® are the copyrighted property of [employer] , [employer]  or Alan Hines  The above information is an  only  It is not intended as medical advice for individual conditions or treatments   Talk to your doctor, nurse or pharmacist before following any medical regimen to see if it is safe and effective for you

## 2020-09-11 ENCOUNTER — OFFICE VISIT (OUTPATIENT)
Dept: URGENT CARE | Facility: MEDICAL CENTER | Age: 35
End: 2020-09-11

## 2020-09-11 VITALS
DIASTOLIC BLOOD PRESSURE: 89 MMHG | RESPIRATION RATE: 18 BRPM | SYSTOLIC BLOOD PRESSURE: 140 MMHG | HEART RATE: 77 BPM | OXYGEN SATURATION: 98 % | BODY MASS INDEX: 22.73 KG/M2 | HEIGHT: 68 IN | TEMPERATURE: 97.7 F | WEIGHT: 150 LBS

## 2020-09-11 DIAGNOSIS — Z48.02 ENCOUNTER FOR REMOVAL OF SUTURES: Primary | ICD-10-CM

## 2020-09-11 PROCEDURE — G0383 LEV 4 HOSP TYPE B ED VISIT: HCPCS | Performed by: PHYSICIAN ASSISTANT

## 2020-09-11 NOTE — PATIENT INSTRUCTIONS
Stitches Removal   AMBULATORY CARE:   Stitches  may need to be removed in 3 to 14 days depending on the location of your wound  Your healthcare provider will use sterile forceps or tweezers to  the knot of each stitch  He will cut the stitch with scissors and pull the stitch out  You may feel a slight tug as the stitch comes out  He may place small steristrips across your wound after the stitches have been removed  These pieces of tape will peel and fall of on their own  Do not pull them off  Seek care immediately if:   · Your wound splits open  · You suddenly cannot move your injured joint  · You have sudden numbness around your wound  · You see red streaks coming from your wound  Contact your healthcare provider if:   · You have a fever and chills  · Your wound is red, warm, swollen, or leaking pus  · There is a bad smell coming from your wound  · You have increased pain in the wound area  · You have questions or concerns about your condition or care  Care for your wound:   · Clean your wound as directed  Carefully wash your wound with soap and water  Pat the area dry with a clean towel  · Protect your wound  Your wound can swell, bleed, or split open if it is stretched or bumped  You may need to wear a bandage that supports your wound until it is completely healed  · Minimize your scar  Use sunblock if your wound is exposed to the sun  Apply it every day after the stitches are removed  This will help prevent skin discoloration  Follow up with your healthcare provider as directed: You may need to return in 3 to 5 days if the stitches are on your face  Stitches on your scalp need to be removed after 7 to 14 days  Stitches over joints may remain in place up to 14 days  Write down your questions so you remember to ask them during your visits     © 2017 [employer] [employer] Information is for End User's use only and may not be sold, redistributed or otherwise used for commercial purposes  All illustrations and images included in CareNotes® are the copyrighted property of [employer]  or Alan Hines  The above information is an  only  It is not intended as medical advice for individual conditions or treatments  Talk to your doctor, nurse or pharmacist before following any medical regimen to see if it is safe and effective for you

## 2020-09-11 NOTE — PROGRESS NOTES
[facility]        NAME: Suze Chan is a 38 y o  female  : 1985    MRN: [id number]  DATE: 2020  TIME: 1:33 PM    Assessment and Plan   Encounter for removal of sutures [Z48 02]  1  Encounter for removal of sutures  Suture removal         Patient Instructions       Follow up with PCP in 3-5 days  Proceed to  ER if symptoms worsen  Chief Complaint     Chief Complaint   Patient presents with    Suture / Staple Removal     Patient is here for have one suture removed from right wrist  Placed here on 20  Suture dry and intact  No pain  No redness  History of Present Illness        57-year-old female presents for suture removal   Has sutures placed 9 days ago  No issues with suture  Denies any swelling erythema drainage  No fevers or chills  Suture / Staple Removal   The sutures were placed 7 to 10 days ago  She tried oral antibiotics since the wound repair  The treatment provided significant relief  There has been no drainage from the wound  There is no redness present  There is no swelling present  There is no pain present  She has no difficulty moving the affected extremity or digit  Review of Systems   Review of Systems   Constitutional: Negative  HENT: Negative  Eyes: Negative  Respiratory: Negative  Cardiovascular: Negative  Gastrointestinal: Negative  Musculoskeletal: Negative  Skin: Negative  Neurological: Negative            Current Medications       Current Outpatient Medications:     albuterol (PROAIR HFA) 90 mcg/act inhaler, Inhale, Disp: , Rfl:     azelastine (ASTELIN) 0 1 % nasal spray, 2 sprays into each nostril as needed  , Disp: , Rfl:     escitalopram (LEXAPRO) 10 mg tablet, TAKE ONE TABLET BY MOUTH EVERY DAY, Disp: 90 tablet, Rfl: 1    metoprolol succinate (TOPROL-XL) 25 mg 24 hr tablet, Take 1 tablet (25 mg total) by mouth daily, Disp: 90 tablet, Rfl: 3    multivitamin (THERAGRAN) TABS, Take 1 tablet by mouth, Disp: , Rfl:     cetirizine (ZyrTEC) 10 mg tablet, Take 1 tablet (10 mg total) by mouth daily (Patient taking differently: Take 10 mg by mouth as needed  ), Disp: 90 tablet, Rfl: 3    hyoscyamine (LEVSIN/SL) 0 125 mg SL tablet, Take 1 tablet (0 125 mg total) by mouth every 6 (six) hours as needed for cramping or diarrhea, Disp: 60 tablet, Rfl: 5    omeprazole (PriLOSEC) 40 MG capsule, Take 1 capsule (40 mg total) by mouth daily, Disp: 90 capsule, Rfl: 1    Current Allergies     Allergies as of 09/11/2020 - Reviewed 09/11/2020   Allergen Reaction Noted    Bee venom  02/06/2018    Codeine  07/10/2017    Oxycodone-acetaminophen Vomiting 03/25/2016            The following portions of the patient's history were reviewed and updated as appropriate: allergies, current medications, past family history, past medical history, past social history, past surgical history and problem list      Past Medical History:   Diagnosis Date    Anxiety     Depression     Eating disorder        Past Surgical History:   Procedure Laterality Date    WISDOM TOOTH EXTRACTION         Family History   Problem Relation Age of Onset    Irregular heart beat Mother         Proxysmal Atrial Tachycardia, PVC    Asthma Father     Hyperlipidemia Father     Hypertension Father     Anxiety disorder Father     Prostate cancer Paternal Grandfather          Medications have been verified  Objective   /89   Pulse 77   Temp 97 7 °F (36 5 °C) (Tympanic)   Resp 18   Ht 5' 8" (1 727 m)   Wt 68 kg (150 lb)   LMP 08/31/2020   SpO2 98%   BMI 22 81 kg/m²        Physical Exam     Physical Exam  Vitals signs and nursing note reviewed  Constitutional:       General: She is not in acute distress  Appearance: She is well-developed  HENT:      Head: Normocephalic and atraumatic        Right Ear: External ear normal       Left Ear: External ear normal       Nose: Nose normal       Mouth/Throat:      Pharynx: No oropharyngeal exudate  Eyes:      General:         Right eye: No discharge  Left eye: No discharge  Conjunctiva/sclera: Conjunctivae normal    Neck:      Musculoskeletal: Normal range of motion and neck supple  Pulmonary:      Effort: Pulmonary effort is normal  No respiratory distress  Musculoskeletal: Normal range of motion  Skin:     General: Skin is warm and dry  Findings: Laceration (  Laceration to the right forearm  Sutures clean dry and intact  Wound is well healed ) present  Neurological:      Mental Status: She is alert and oriented to person, place, and time  Suture removal    Date/Time: 9/11/2020 1:32 PM  Performed by: David Corey PA-C  Authorized by: David Corey PA-C     Patient location:  Clinic  Other Assisting Provider: No    Consent:     Consent obtained:  Verbal    Consent given by:  Patient    Risks discussed:  Bleeding, pain and wound separation    Alternatives discussed:  No treatment  Universal protocol:     Procedure explained and questions answered to patient or proxy's satisfaction: yes      Patient identity confirmed:  Verbally with patient  Location:     Laterality:  Right    Location:  Upper extremity    Upper extremity location:  Wrist    Wrist location:  R wrist  Procedure details: Tools used:  Suture removal kit    Wound appearance:  No sign(s) of infection, good wound healing and clean    Number of sutures removed:  1  Post-procedure details:     Post-removal:  Band-Aid applied    Patient tolerance of procedure:   Tolerated well, no immediate complications

## 2020-11-02 ENCOUNTER — OFFICE VISIT (OUTPATIENT)
Dept: URGENT CARE | Facility: MEDICAL CENTER | Age: 35
End: 2020-11-02
Payer: OTHER GOVERNMENT

## 2020-11-02 VITALS
OXYGEN SATURATION: 99 % | RESPIRATION RATE: 20 BRPM | HEIGHT: 68 IN | SYSTOLIC BLOOD PRESSURE: 130 MMHG | WEIGHT: 160 LBS | TEMPERATURE: 99.2 F | HEART RATE: 84 BPM | BODY MASS INDEX: 24.25 KG/M2 | DIASTOLIC BLOOD PRESSURE: 86 MMHG

## 2020-11-02 DIAGNOSIS — Z11.59 SPECIAL SCREENING EXAMINATION FOR UNSPECIFIED VIRAL DISEASE: Primary | ICD-10-CM

## 2020-11-02 PROCEDURE — U0003 INFECTIOUS AGENT DETECTION BY NUCLEIC ACID (DNA OR RNA); SEVERE ACUTE RESPIRATORY SYNDROME CORONAVIRUS 2 (SARS-COV-2) (CORONAVIRUS DISEASE [COVID-19]), AMPLIFIED PROBE TECHNIQUE, MAKING USE OF HIGH THROUGHPUT TECHNOLOGIES AS DESCRIBED BY CMS-2020-01-R: HCPCS | Performed by: PHYSICIAN ASSISTANT

## 2020-11-02 PROCEDURE — G0382 LEV 3 HOSP TYPE B ED VISIT: HCPCS | Performed by: PHYSICIAN ASSISTANT

## 2020-11-05 LAB — SARS-COV-2 RNA SPEC QL NAA+PROBE: NOT DETECTED

## 2020-12-14 DIAGNOSIS — F41.1 GENERALIZED ANXIETY DISORDER: ICD-10-CM

## 2020-12-14 RX ORDER — ESCITALOPRAM OXALATE 10 MG/1
TABLET ORAL
Qty: 90 TABLET | Refills: 0 | Status: SHIPPED | OUTPATIENT
Start: 2020-12-14 | End: 2021-02-01 | Stop reason: SDUPTHER

## 2020-12-14 NOTE — TELEPHONE ENCOUNTER
Has been 1 year since her last visit with primary care  Will renew, but she should have office visit

## 2021-02-01 ENCOUNTER — TELEMEDICINE (OUTPATIENT)
Dept: FAMILY MEDICINE CLINIC | Facility: CLINIC | Age: 36
End: 2021-02-01

## 2021-02-01 DIAGNOSIS — R03.0 ELEVATED BLOOD PRESSURE READING: ICD-10-CM

## 2021-02-01 DIAGNOSIS — R00.2 HEART PALPITATIONS: ICD-10-CM

## 2021-02-01 DIAGNOSIS — J45.20 MILD INTERMITTENT ASTHMA WITHOUT COMPLICATION: ICD-10-CM

## 2021-02-01 DIAGNOSIS — F41.1 GENERALIZED ANXIETY DISORDER: Primary | ICD-10-CM

## 2021-02-01 PROCEDURE — 99214 OFFICE O/P EST MOD 30 MIN: CPT | Performed by: FAMILY MEDICINE

## 2021-02-01 RX ORDER — ESCITALOPRAM OXALATE 10 MG/1
10 TABLET ORAL DAILY
Qty: 90 TABLET | Refills: 1 | Status: SHIPPED | OUTPATIENT
Start: 2021-02-01 | End: 2021-09-15

## 2021-02-01 NOTE — ASSESSMENT & PLAN NOTE
Minimal to no problems  Occasional use of albuterol, last was quite sometime ago  Has inhaler available

## 2021-02-01 NOTE — ASSESSMENT & PLAN NOTE
Patient is on beta-blocker, but this is for palpitations  Would recommend recheck blood pressure in the future  Follow-up at next visit in approximately 6 months  If her home blood pressures over 140/86, she should contact the   Office

## 2021-02-01 NOTE — ASSESSMENT & PLAN NOTE
Doing quite well with the Lexapro  Much less anxiety  Would like to continue  Renewed medication  Follow-up in 6 months

## 2021-02-01 NOTE — PROGRESS NOTES
Virtual Regular Visit      Assessment/Plan:    Problem List Items Addressed This Visit     Anxiety disorder - Primary      Doing quite well with the Lexapro  Much less anxiety  Would like to continue  Renewed medication  Follow-up in 6 months  Relevant Medications    escitalopram (LEXAPRO) 10 mg tablet    Asthma       Minimal to no problems  Occasional use of albuterol, last was quite sometime ago  Has inhaler available  Elevated blood pressure reading       Patient is on beta-blocker, but this is for palpitations  Would recommend recheck blood pressure in the future  Follow-up at next visit in approximately 6 months  If her home blood pressures over 140/86, she should contact the   Office  Heart palpitations       Stable  Denies any current problems  On metoprolol  No change  Reason for visit is   Chief Complaint   Patient presents with    Virtual Regular Visit   [address] [facility] Ast[facility]        Encounter provider Tomás Calhoun MD    Provider located at [address] [place] [address] PRIMARY CARE  [facility] [address]      Recent Visits  No visits were found meeting these conditions  Showing recent visits within past 7 days and meeting all other requirements     Today's Visits  Date Type Provider Dept   02/01/21 Telemedicine Tomás Calhoun MD [facility]   Showing today's visits and meeting all other requirements     Future Appointments  No visits were found meeting these conditions  Showing future appointments within next 150 days and meeting all other requirements        The patient was identified by name and date of birth  Sumi María was informed that this is a telemedicine visit and that the visit is being conducted through [employer] and patient was informed that this is not a secure, HIPAA-compliant platform  She agrees to proceed     My office door was closed  No one else was in the room  She acknowledged consent and understanding of privacy and security of the video platform  The patient has agreed to participate and understands they can discontinue the visit at any time  Patient is aware this is a billable service  Johnny Casiano is a 38 y o  female   Chief Complaint   Patient presents with    Virtual Regular Visit    Anxiety    Asthma        Anxiety: Much better with Lexapro  Has been OK overall  Neg SI  Asthma:  She is really not needed to use the inhaler  She does have 1 available, however  Elevated blood pressure: Noted previously  She has not Used a blood pressure cuff at home  Palpitations: REmians on Metoprolol  Has been OK with this  Past Medical History:   Diagnosis Date    Anxiety     Depression     Eating disorder        Past Surgical History:   Procedure Laterality Date    WISDOM TOOTH EXTRACTION         Current Outpatient Medications   Medication Sig Dispense Refill    azelastine (ASTELIN) 0 1 % nasal spray 2 sprays into each nostril as needed        cetirizine (ZyrTEC) 10 mg tablet Take 1 tablet (10 mg total) by mouth daily (Patient taking differently: Take 10 mg by mouth as needed  ) 90 tablet 3    escitalopram (LEXAPRO) 10 mg tablet Take 1 tablet (10 mg total) by mouth daily 90 tablet 1    hyoscyamine (LEVSIN/SL) 0 125 mg SL tablet Take 1 tablet (0 125 mg total) by mouth every 6 (six) hours as needed for cramping or diarrhea 60 tablet 5    metoprolol succinate (TOPROL-XL) 25 mg 24 hr tablet Take 1 tablet (25 mg total) by mouth daily 90 tablet 3    multivitamin (THERAGRAN) TABS Take 1 tablet by mouth      omeprazole (PriLOSEC) 40 MG capsule Take 1 capsule (40 mg total) by mouth daily 90 capsule 1    albuterol (PROAIR HFA) 90 mcg/act inhaler Inhale       No current facility-administered medications for this visit           Allergies   Allergen Reactions    Bee Venom     Codeine      Other reaction(s): Nausea and/or vomiting    Oxycodone-Acetaminophen Vomiting       Review of Systems   Constitutional: Negative  HENT: Negative  Eyes: Negative  Respiratory: Negative  Cardiovascular: Negative  Gastrointestinal: Negative  Endocrine: Negative  Genitourinary: Negative  Musculoskeletal: Negative  Skin: Negative  Allergic/Immunologic: Negative  Neurological: Negative  Hematological: Negative  Psychiatric/Behavioral: Negative  Video Exam    There were no vitals filed for this visit  Physical Exam  Nursing note reviewed  Constitutional:       General: She is not in acute distress  Appearance: She is well-developed  HENT:      Head: Normocephalic and atraumatic  Pulmonary:      Effort: Pulmonary effort is normal       Breath sounds: Normal breath sounds  I spent 15 minutes directly with the patient during this visit      Rell acknowledges that she has consented to an online visit or consultation  She understands that the online visit is based solely on information provided by her, and that, in the absence of a face-to-face physical evaluation by the physician, the diagnosis she receives is both limited and provisional in terms of accuracy and completeness  This is not intended to replace a full medical face-to-face evaluation by the physician  Minerva Claude understands and accepts these terms

## 2021-02-01 NOTE — PATIENT INSTRUCTIONS
Problem List Items Addressed This Visit     Anxiety disorder - Primary      Doing quite well with the Lexapro  Much less anxiety  Would like to continue  Renewed medication  Follow-up in 6 months  Relevant Medications    escitalopram (LEXAPRO) 10 mg tablet    Asthma       Minimal to no problems  Occasional use of albuterol, last was quite sometime ago  Has inhaler available  Elevated blood pressure reading       Patient is on beta-blocker, but this is for palpitations  Would recommend recheck blood pressure in the future  Follow-up at next visit in approximately 6 months  If her home blood pressures over 140/86, she should contact the   Office  Heart palpitations       Stable  Denies any current problems  On metoprolol  No change  COVID 19 Instructions    La Nena Rios was advised to limit contact with others to essential tasks such as getting food, medications, and medical care  Proper handwashing reviewed, and Hand sanitzer when washing is not available  If the patient develops symptoms of COVID 19, the patient should call the office as soon as possible  For 1711-6927 Flu season, it is strongly recommended that Flu Vaccinations be obtained  Please try to download Google Duo  Once you do download this on your phone, you will be prompted to add your phone number to the account  After that, he should receive a text from [employer], and use that code to verify your phone number  After that, you should be able to use Google Duo to receive and make video calls  Please download Microsoft Teams to your phone or computer  We will be transitioning to this platform for Video Visits  Instructions for downloading this are available from the office  We are committed to getting you vaccinated as soon as possible and will be closely following CDC and [facility]  guidelines as they are released and revised    Please refer to our COVID-19 vaccine webpage for the most up to date information on the vaccine and our distribution efforts      KosherNamgrant tn

## 2021-03-30 DIAGNOSIS — Z23 ENCOUNTER FOR IMMUNIZATION: ICD-10-CM

## 2021-04-01 ENCOUNTER — IMMUNIZATIONS (OUTPATIENT)
Dept: FAMILY MEDICINE CLINIC | Facility: HOSPITAL | Age: 36
End: 2021-04-01

## 2021-04-01 DIAGNOSIS — Z23 ENCOUNTER FOR IMMUNIZATION: Primary | ICD-10-CM

## 2021-04-01 PROCEDURE — 0011A SARS-COV-2 / COVID-19 MRNA VACCINE (MODERNA) 100 MCG: CPT

## 2021-04-01 PROCEDURE — 91301 SARS-COV-2 / COVID-19 MRNA VACCINE (MODERNA) 100 MCG: CPT

## 2021-04-02 ENCOUNTER — TELEMEDICINE (OUTPATIENT)
Dept: BEHAVIORAL/MENTAL HEALTH CLINIC | Facility: CLINIC | Age: 36
End: 2021-04-02

## 2021-04-02 DIAGNOSIS — F33.0 MILD EPISODE OF RECURRENT MAJOR DEPRESSIVE DISORDER (HCC): ICD-10-CM

## 2021-04-02 DIAGNOSIS — F41.1 GENERALIZED ANXIETY DISORDER: Primary | ICD-10-CM

## 2021-04-02 PROCEDURE — 90834 PSYTX W PT 45 MINUTES: CPT | Performed by: SOCIAL WORKER

## 2021-04-06 NOTE — PSYCH
Virtual Regular Visit      Assessment/Plan:    Problem List Items Addressed This Visit     None               Reason for visit is No chief complaint on file  Encounter provider Km Waller    Provider located at [address] [place] [id number] [phone]      Recent Visits  No visits were found meeting these conditions  Showing recent visits within past 7 days and meeting all other requirements     Future Appointments  No visits were found meeting these conditions  Showing future appointments within next 150 days and meeting all other requirements        The patient was identified by name and date of birth  Jen Britt was informed that this is a telemedicine visit and that the visit is being conducted through [employer] and patient was informed that this is a secure, HIPAA-compliant platform  She agrees to proceed     My office door was closed  No one else was in the room  She acknowledged consent and understanding of privacy and security of the video platform  The patient has agreed to participate and understands they can discontinue the visit at any time  Patient is aware this is a billable service  Subjective  Jen Britt is a 43 y o  female MICHAEL- Edilma Noguera is returning to treatment due to an increase in stress in her life  She stated that she lost her job due to [place] and made a decision  To go back to school for nursing  She stated that she has been enjoying it and is looking forward to a career change  She stated that things have been difficult in her relationship with her mother  In addition, she stated that she and her  had been having some issues regarding the fact that she was unemployed and collecting unemployment  She stated that she feels that she has been able to work things out with him   Processing her emotions and discussing the current stressors in her life  Discussing her triggers and ways for her to navigate this  Also discussing use of coping mechanisms as well as practicing self care  Creating a treatment plan  Giving supportive therapy  A- Progress- Continuing to work towards completing her treatment goals  P-Continue treatment  HPI     Past Medical History:   Diagnosis Date    Anxiety     Depression     Eating disorder        Past Surgical History:   Procedure Laterality Date    WISDOM TOOTH EXTRACTION         Current Outpatient Medications   Medication Sig Dispense Refill    albuterol (PROAIR HFA) 90 mcg/act inhaler Inhale      azelastine (ASTELIN) 0 1 % nasal spray 2 sprays into each nostril as needed        cetirizine (ZyrTEC) 10 mg tablet Take 1 tablet (10 mg total) by mouth daily (Patient taking differently: Take 10 mg by mouth as needed  ) 90 tablet 3    escitalopram (LEXAPRO) 10 mg tablet Take 1 tablet (10 mg total) by mouth daily 90 tablet 1    hyoscyamine (LEVSIN/SL) 0 125 mg SL tablet Take 1 tablet (0 125 mg total) by mouth every 6 (six) hours as needed for cramping or diarrhea 60 tablet 5    metoprolol succinate (TOPROL-XL) 25 mg 24 hr tablet Take 1 tablet (25 mg total) by mouth daily 90 tablet 3    multivitamin (THERAGRAN) TABS Take 1 tablet by mouth      omeprazole (PriLOSEC) 40 MG capsule Take 1 capsule (40 mg total) by mouth daily 90 capsule 1     No current facility-administered medications for this visit  Allergies   Allergen Reactions    Bee Venom     Codeine      Other reaction(s): Nausea and/or vomiting    Oxycodone-Acetaminophen Vomiting       Review of Systems    Video Exam    There were no vitals filed for this visit  Physical Exam     I spent 45 minutes directly with the patient during this visit      Rell acknowledges that she has consented to an online visit or consultation   She understands that the online visit is based solely on information provided by her, and that, in the absence of a face-to-face physical evaluation by the physician, the diagnosis she receives is both limited and provisional in terms of accuracy and completeness  This is not intended to replace a full medical face-to-face evaluation by the physician  Eris Handley understands and accepts these terms

## 2021-04-06 NOTE — BH TREATMENT PLAN
Isis Choudhary  1985       Date of Initial Treatment Plan: 4/2/21   Date of Current Treatment Plan: 04/06/21    Treatment Plan Number 1     Strengths/Personal Resources for Self Care: Creative, organized, loyal, caring, intelligent    Diagnosis:   1  Generalized anxiety disorder     2  Mild episode of recurrent major depressive disorder ([facility] [address] )         Area of Needs:   Grief/loss  Stress    Long Term Goal 1: Be able to cope with the losses        Target Date: 10/2/21  Completion Date: TBD         Short Term Objectives for Goal 1:       Be able to talk about it      Do not minimize what I am feeling      Use my support system      Set boundaries with mom        Long Term Goal 2: Be able to manage my stress           Target Date: 10/2/21  Completion Date: TBD     Short Term Objectives for Goal 2:   Set boundaries with others  Be able to identify my triggers  Use healthy coping mechanisms for stress                 GOAL 1: Modality: Individual 1-2x per month   Completion Date TBD and The person(s) responsible for carrying out the plan is [place]     GOAL 2: Modality: Individual 1-2x per month   Completion Date TBD and The person(s) responsible for carrying out the plan is [address]: Diagnosis and Treatment Plan explained to Karen Flores relates understanding diagnosis and is agreeable to Treatment Plan         Client Comments : Please share your thoughts, feelings, need and/or experiences regarding your treatment plan: None

## 2021-04-06 NOTE — PSYCH
Treatment Plan Tracking    # 1Treatment Plan not completed within required time limits due to: Treatment Plan done but not signed at time of office visit due to:  Plan reviewed by phone or in person  and verbal consent given due to [address] distancing

## 2021-04-15 DIAGNOSIS — R00.2 HEART PALPITATIONS: ICD-10-CM

## 2021-04-15 RX ORDER — METOPROLOL SUCCINATE 25 MG/1
TABLET, EXTENDED RELEASE ORAL
Qty: 90 TABLET | Refills: 3 | Status: SHIPPED | OUTPATIENT
Start: 2021-04-15 | End: 2022-04-19

## 2021-05-05 ENCOUNTER — IMMUNIZATIONS (OUTPATIENT)
Dept: FAMILY MEDICINE CLINIC | Facility: HOSPITAL | Age: 36
End: 2021-05-05

## 2021-05-05 DIAGNOSIS — Z23 ENCOUNTER FOR IMMUNIZATION: Primary | ICD-10-CM

## 2021-05-05 PROCEDURE — 91301 SARS-COV-2 / COVID-19 MRNA VACCINE (MODERNA) 100 MCG: CPT

## 2021-05-05 PROCEDURE — 0012A SARS-COV-2 / COVID-19 MRNA VACCINE (MODERNA) 100 MCG: CPT

## 2021-05-18 ENCOUNTER — TELEMEDICINE (OUTPATIENT)
Dept: BEHAVIORAL/MENTAL HEALTH CLINIC | Facility: CLINIC | Age: 36
End: 2021-05-18

## 2021-05-18 DIAGNOSIS — F33.0 MILD EPISODE OF RECURRENT MAJOR DEPRESSIVE DISORDER (HCC): ICD-10-CM

## 2021-05-18 DIAGNOSIS — F41.1 GENERALIZED ANXIETY DISORDER: Primary | ICD-10-CM

## 2021-05-18 PROCEDURE — 90834 PSYTX W PT 45 MINUTES: CPT | Performed by: SOCIAL WORKER

## 2021-05-18 NOTE — PSYCH
Virtual Regular Visit      Assessment/Plan:    Problem List Items Addressed This Visit     None          Goals addressed in session: Goal 1          Reason for visit is No chief complaint on file  Encounter provider Carey Lewis    Provider located at [address] [place] [id number] [phone]      Recent Visits  No visits were found meeting these conditions  Showing recent visits within past 7 days and meeting all other requirements     Future Appointments  No visits were found meeting these conditions  Showing future appointments within next 150 days and meeting all other requirements        The patient was identified by name and date of birth  Re Shook was informed that this is a telemedicine visit and that the visit is being conducted through AngioSlide and patient was informed that this is a secure, HIPAA-compliant platform  She agrees to proceed     My office door was closed  No one else was in the room  She acknowledged consent and understanding of privacy and security of the video platform  The patient has agreed to participate and understands they can discontinue the visit at any time  Patient is aware this is a billable service  Subjective  Re Shook is a 43 y o  female Annie Romo stated that things have been improving for her  She stated that she has been working PT at a friends new business to assist them and has also been continuing with school  She discussed an issues with her  regarding having been laid off last year during the pandemic  She stated that since she has been assisting at the new business his attitude towards her has changed  She stated that it upsets her that he was treating her differently during her lay off period   She stated that she also continues to be upset with her mother for how she conducted herself regarding her father's remains  Processing her emotions and discussing ways for her to communicate her feelings to her   Also discussing how she wants to set and maintain boundaries in her relationship with her mother and also create a special place for her father  Giving supportive therapy  A- Progress- Continuing to work towards completing her treatment goals  P_Continue treatment   HPI     Past Medical History:   Diagnosis Date    Anxiety     Depression     Eating disorder        Past Surgical History:   Procedure Laterality Date    WISDOM TOOTH EXTRACTION         Current Outpatient Medications   Medication Sig Dispense Refill    albuterol (PROAIR HFA) 90 mcg/act inhaler Inhale      azelastine (ASTELIN) 0 1 % nasal spray 2 sprays into each nostril as needed        cetirizine (ZyrTEC) 10 mg tablet Take 1 tablet (10 mg total) by mouth daily (Patient taking differently: Take 10 mg by mouth as needed  ) 90 tablet 3    escitalopram (LEXAPRO) 10 mg tablet Take 1 tablet (10 mg total) by mouth daily 90 tablet 1    hyoscyamine (LEVSIN/SL) 0 125 mg SL tablet Take 1 tablet (0 125 mg total) by mouth every 6 (six) hours as needed for cramping or diarrhea 60 tablet 5    metoprolol succinate (TOPROL-XL) 25 mg 24 hr tablet TAKE ONE TABLET BY MOUTH EVERY DAY 90 tablet 3    multivitamin (THERAGRAN) TABS Take 1 tablet by mouth      omeprazole (PriLOSEC) 40 MG capsule Take 1 capsule (40 mg total) by mouth daily 90 capsule 1     No current facility-administered medications for this visit  Allergies   Allergen Reactions    Bee Venom     Codeine      Other reaction(s): Nausea and/or vomiting    Oxycodone-Acetaminophen Vomiting       Review of Systems    Video Exam    There were no vitals filed for this visit      Physical Exam     I spent 45 minutes directly with the patient during this visit      Rell acknowledges that she has consented to an online visit or consultation  She understands that the online visit is based solely on information provided by her, and that, in the absence of a face-to-face physical evaluation by the physician, the diagnosis she receives is both limited and provisional in terms of accuracy and completeness  This is not intended to replace a full medical face-to-face evaluation by the physician  Moreno Velasco understands and accepts these terms

## 2021-05-24 ENCOUNTER — OFFICE VISIT (OUTPATIENT)
Dept: FAMILY MEDICINE CLINIC | Facility: CLINIC | Age: 36
End: 2021-05-24

## 2021-05-24 VITALS
TEMPERATURE: 97.4 F | DIASTOLIC BLOOD PRESSURE: 84 MMHG | WEIGHT: 171 LBS | SYSTOLIC BLOOD PRESSURE: 132 MMHG | HEIGHT: 68 IN | HEART RATE: 106 BPM | BODY MASS INDEX: 25.91 KG/M2

## 2021-05-24 DIAGNOSIS — Z00.00 ENCOUNTER FOR PHYSICAL EXAMINATION: Primary | ICD-10-CM

## 2021-05-24 PROBLEM — H91.92 CHANGE IN HEARING OF LEFT EAR: Status: RESOLVED | Noted: 2017-10-17 | Resolved: 2021-05-24

## 2021-05-24 PROCEDURE — 99395 PREV VISIT EST AGE 18-39: CPT | Performed by: PHYSICIAN ASSISTANT

## 2021-05-24 NOTE — ASSESSMENT & PLAN NOTE
Patient is going to nursing school  She needs to obtain the dates of her hepatitis vaccine series  Blood work including MMR varicella TB quant gold and hepatitis B antigen ordered  Await results to complete form

## 2021-05-24 NOTE — PROGRESS NOTES
[facility] PRIMARY [facility]ARE    NAME: Mayte Cullen  AGE: 43 y o  SEX: female  : 1985     DATE: 2021     Assessment and Plan:     Problem List Items Addressed This Visit        Other    Encounter for physical examination - Primary       Patient is going to nursing school  She needs to obtain the dates of her hepatitis vaccine series  Blood work including MMR varicella TB quant gold and hepatitis B antigen ordered  Await results to complete form  Relevant Orders    Measles/Mumps/Rubella Immunity    Varicella zoster antibody, IgG    Hepatitis B surface antigen    Quantiferon TB Gold Plus          Immunizations and preventive care screenings were discussed with patient today  Appropriate education was printed on patient's after visit summary  Counseling:  · Dental Health: discussed importance of regular tooth brushing, flossing, and dental visits  BMI Counseling: Body mass index is 26 kg/m²  The BMI is above normal  Nutrition recommendations include decreasing portion sizes, encouraging healthy choices of fruits and vegetables, decreasing fast food intake, consuming healthier snacks, limiting drinks that contain sugar, moderation in carbohydrate intake, increasing intake of lean protein, reducing intake of saturated and trans fat and reducing intake of cholesterol  No follow-ups on file  Chief Complaint:     Chief Complaint   Patient presents with    Annual Exam     pt presents in office for physical exam/ 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   History of Present Illness:     Adult Annual Physical   Patient here for a comprehensive physical exam  The patient reports no problems  Diet and Physical Activity  · Diet/Nutrition: poor diet  · Exercise: starting light exercise        Depression Screening  PHQ-9 Depression Screening    PHQ-9:   Frequency of the following problems over the past two weeks: General Health  · Sleep: sleeps poorly  · Hearing: normal - bilateral   · Vision: goes for regular eye exams  · Dental: no dental visits for >1 year  /GYN Health  · Patient is: premenopausal  · Last menstrual period: this month  · Contraceptive method: none  Review of Systems:     Review of Systems   Constitutional: Negative  HENT: Negative  Eyes: Negative  Respiratory: Negative  Cardiovascular: Negative  Gastrointestinal: Negative  Endocrine: Negative  Genitourinary: Negative  Musculoskeletal: Negative  Skin: Negative  Allergic/Immunologic: Negative  Neurological: Negative  Hematological: Negative  Psychiatric/Behavioral: Negative         Past Medical History:     Past Medical History:   Diagnosis Date    Anxiety     Depression     Eating disorder       Past Surgical History:     Past Surgical History:   Procedure Laterality Date    WISDOM TOOTH EXTRACTION        Social History:        Social History     Socioeconomic History    Marital status: /Civil Union     Spouse name: None    Number of children: None    Years of education: None    Highest education level: None   Occupational History    None   Social Needs    Financial resource strain: None    Food insecurity     Worry: None     Inability: None    Transportation needs     Medical: None     Non-medical: None   Tobacco Use    Smoking status: Former Smoker    Smokeless tobacco: Never Used   Substance and Sexual Activity    Alcohol use: Yes     Frequency: 2-4 times a month     Drinks per session: 1 or 2    Drug use: No    Sexual activity: Yes     Birth control/protection: OCP   Lifestyle    Physical activity     Days per week: None     Minutes per session: None    Stress: None   Relationships    Social connections     Talks on phone: None     Gets together: None     Attends Adventism service: None     Active member of club or organization: None     Attends meetings of clubs or organizations: None     Relationship status: None    Intimate partner violence     Fear of current or ex partner: None     Emotionally abused: None     Physically abused: None     Forced sexual activity: None   Other Topics Concern    None   Social History Narrative    None      Family History:     Family History   Problem Relation Age of Onset    Irregular heart beat Mother         Proxysmal Atrial Tachycardia, PVC    Asthma Father     Hyperlipidemia Father     Hypertension Father     Anxiety disorder Father     Prostate cancer Paternal Grandfather       Current Medications:     Current Outpatient Medications   Medication Sig Dispense Refill    albuterol (PROAIR HFA) 90 mcg/act inhaler Inhale      azelastine (ASTELIN) 0 1 % nasal spray 2 sprays into each nostril as needed        cetirizine (ZyrTEC) 10 mg tablet Take 1 tablet (10 mg total) by mouth daily (Patient taking differently: Take 10 mg by mouth as needed  ) 90 tablet 3    escitalopram (LEXAPRO) 10 mg tablet Take 1 tablet (10 mg total) by mouth daily 90 tablet 1    hyoscyamine (LEVSIN/SL) 0 125 mg SL tablet Take 1 tablet (0 125 mg total) by mouth every 6 (six) hours as needed for cramping or diarrhea 60 tablet 5    metoprolol succinate (TOPROL-XL) 25 mg 24 hr tablet TAKE ONE TABLET BY MOUTH EVERY DAY 90 tablet 3    multivitamin (THERAGRAN) TABS Take 1 tablet by mouth      omeprazole (PriLOSEC) 40 MG capsule Take 1 capsule (40 mg total) by mouth daily (Patient taking differently: Take 20 mg by mouth daily ) 90 capsule 1     No current facility-administered medications for this visit  Allergies:      Allergies   Allergen Reactions    Bee Venom     Codeine      Other reaction(s): Nausea and/or vomiting    Oxycodone-Acetaminophen Vomiting      Physical Exam:     /84 (BP Location: Left arm, Patient Position: Sitting, Cuff Size: Adult)   Pulse (!) 106   Temp (!) 97 4 °F (36 3 °C) (Temporal)   Ht 5' 8" (1 727 m)   Wt 77 6 kg (171 lb)   BMI 26 00 kg/m²     Physical Exam  Vitals signs and nursing note reviewed  Constitutional:       General: She is not in acute distress  Appearance: She is well-developed  She is not diaphoretic  HENT:      Head: Normocephalic and atraumatic  Right Ear: External ear normal       Left Ear: External ear normal       Nose: Nose normal       Mouth/Throat:      Pharynx: No oropharyngeal exudate  Eyes:      General: No scleral icterus  Right eye: No discharge  Left eye: No discharge  Conjunctiva/sclera: Conjunctivae normal       Pupils: Pupils are equal, round, and reactive to light  Neck:      Musculoskeletal: Normal range of motion and neck supple  Thyroid: No thyromegaly  Vascular: No JVD  Trachea: No tracheal deviation  Cardiovascular:      Rate and Rhythm: Normal rate and regular rhythm  Heart sounds: Normal heart sounds  No murmur  No friction rub  No gallop  Pulmonary:      Effort: Pulmonary effort is normal  No respiratory distress  Breath sounds: Normal breath sounds  No stridor  No wheezing or rales  Chest:      Chest wall: No tenderness  Abdominal:      General: Bowel sounds are normal  There is no distension  Palpations: Abdomen is soft  There is no mass  Tenderness: There is no abdominal tenderness  There is no guarding or rebound  Hernia: No hernia is present  Musculoskeletal: Normal range of motion  General: No deformity  Lymphadenopathy:      Cervical: No cervical adenopathy  Skin:     General: Skin is warm and dry  Capillary Refill: Capillary refill takes more than 3 seconds  Findings: No rash  Neurological:      Mental Status: She is alert and oriented to person, place, and time  Motor: No abnormal muscle tone  Coordination: Coordination normal       Deep Tendon Reflexes: Reflexes normal    Psychiatric:         Behavior: Behavior normal          Thought Content:  Thought content normal          Judgment: Judgment normal             BMI Counseling: Body mass index is 26 kg/m²  The BMI is above normal  Nutrition recommendations include reducing portion sizes, decreasing overall calorie intake, 3-5 servings of fruits/vegetables daily, reducing fast food intake, consuming healthier snacks, decreasing soda and/or juice intake, moderation in carbohydrate intake, increasing intake of lean protein, reducing intake of saturated fat and trans fat and reducing intake of cholesterol    Arjun Bo PA-C  [facility]

## 2021-06-21 ENCOUNTER — OFFICE VISIT (OUTPATIENT)
Dept: OBGYN CLINIC | Facility: MEDICAL CENTER | Age: 36
End: 2021-06-21
Payer: COMMERCIAL

## 2021-06-21 VITALS
SYSTOLIC BLOOD PRESSURE: 122 MMHG | WEIGHT: 170 LBS | DIASTOLIC BLOOD PRESSURE: 70 MMHG | HEIGHT: 68 IN | BODY MASS INDEX: 25.76 KG/M2

## 2021-06-21 DIAGNOSIS — Z01.419 WELL WOMAN EXAM: Primary | ICD-10-CM

## 2021-06-21 PROCEDURE — G0145 SCR C/V CYTO,THINLAYER,RESCR: HCPCS | Performed by: NURSE PRACTITIONER

## 2021-06-21 PROCEDURE — G0476 HPV COMBO ASSAY CA SCREEN: HCPCS | Performed by: NURSE PRACTITIONER

## 2021-06-21 PROCEDURE — 99385 PREV VISIT NEW AGE 18-39: CPT | Performed by: NURSE PRACTITIONER

## 2021-06-21 NOTE — PROGRESS NOTES
Subjective      Porsha Alcaraz is a 43 y o  female who presents for annual well woman exam   Last Pap smear 16 resulted NILM/ HR HPV negative  Has history of abnormal pap smears  Pap smear collected today  Periods are regular every 5-6 weeks, lasting 1-3 days  No intermenstrual bleeding, spotting, or discharge  Current contraception: none  History of abnormal Pap smear: yes   Family history of uterine or ovarian cancer: yes - maternal grandmother   Regular self breast exam: no  History of abnormal mammogram: to begin at age 34  Family history of breast cancer: no  History of abnormal lipids: no  Gardasil vaccine: unsure       Menstrual History:  OB History        0    Para   0    Term   0       0    AB   0    Living   0       SAB   0    TAB   0    Ectopic   0    Multiple   0    Live Births   0                Menarche age: 13  Patient's last menstrual period was 2021 (exact date)  Period Cycle (Days):  (5-6 weeks)  Period Duration (Days): 1-2  Period Pattern: Regular  Menstrual Flow: Moderate  Dysmenorrhea: (!) Mild  Dysmenorrhea Symptoms: Cramping    The following portions of the patient's history were reviewed and updated as appropriate: allergies, current medications, past family history, past medical history, past social history, past surgical history and problem list     Review of Systems  Pertinent items are noted in HPI        Objective      /70 (BP Location: Right arm, Patient Position: Sitting, Cuff Size: Standard)   Ht 5' 8" (1 727 m)   Wt 77 1 kg (170 lb)   LMP 2021 (Exact Date)   Breastfeeding No   BMI 25 85 kg/m²     General:   alert and oriented, in no acute distress, alert, appears stated age and cooperative   Heart: regular rate and rhythm, S1, S2 normal, no murmur, click, rub or gallop   Lungs: clear to auscultation bilaterally   Abdomen: soft, non-tender, without masses or organomegaly and nondistended   Vulva: normal, Bartholin's, Urethra, [place]'s normal, female escutcheon   Vagina: normal mucosa, normal discharge, no palpable nodules   Cervix: no bleeding following Pap, no cervical motion tenderness and no lesions   Uterus: normal size, non-tender, normal shape and consistency   Adnexa: normal adnexa and no mass, fullness, tenderness   Breast: breasts appear normal, no suspicious masses, no skin or nipple changes or axillary nodes  Assessment      @well woman@   Plan      Await pap smear results  Breast self exam technique reviewed and patient encouraged to perform self-exam monthly  Diagnosis explained in detail, including differential   Dietary diary  Discussed healthy lifestyle modifications  Educational material distributed  Follow up in 1 years  Follow up as needed  Thin prep Pap smear  breast awareness reviewed   Encouraged healthy diet, exercise and lifestyle  Encouraged follow-up with PCP as needed  Encouraged seasonal influenza vaccination  Gardasil vaccine series reviewed  Written information provided  Encouraged social distancing, good hand hygiene, avoidance of crowds and masking  Written information provided about COVID-19  Have vaccine  Will call with results  VBI-    BMI Counseling: Body mass index is 25 85 kg/m²  The BMI is above normal  Nutrition recommendations include reducing portion sizes, decreasing overall calorie intake and 3-5 servings of fruits/vegetables daily  Exercise recommendations include exercising 3-5 times per week, joining a gym and strength training exercises

## 2021-06-21 NOTE — PATIENT INSTRUCTIONS
Breast Self Exam for Women   WHAT YOU NEED TO KNOW:   What is a breast self-exam (BSE)? A BSE is a way to check your breasts for lumps and other changes  Regular BSEs can help you know how your breasts normally look and feel  Most breast lumps or changes are not cancer, but you should always have them checked by a healthcare provider  Your healthcare provider can also watch you do a BSE and can tell you if you are doing your BSE correctly  Why should I do a BSE? Breast cancer is the most common type of cancer in women  Even if you have mammograms, you may still want to do a BSE regularly  If you know how your breasts normally feel and look, it may help you know when to contact your healthcare provider  Mammograms can miss some cancers  You may find a lump during a BSE that did not show up on a mammogram   When should I do a BSE? If you have periods, you may want to do your BSE 1 week after your period ends  This is the time when your breasts may be the least swollen, lumpy, or tender  You can do regular BSEs even if you are breastfeeding or have breast implants  How should I do a BSE? · Look at your breasts in a mirror  Look at the size and shape of each breast and nipple  Check for swelling, lumps, dimpling, scaly skin, or other skin changes  Look for nipple changes, such as a nipple that is painful or beginning to pull inward  Gently squeeze both nipples and check to see if fluid (that is not breast milk) comes out of them  If you find any of these or other breast changes, contact your healthcare provider  Check your breasts while you sit or  the following 3 positions:    ? Hang your arms down at your sides  ? Raise your hands and join them behind your head  ? Put firm pressure with your hands on your hips  Bend slightly forward while you look at your breasts in the mirror  · Lie down and feel your breasts    When you lie down, your breast tissue spreads out evenly over your chest  This makes it easier for you to feel for lumps and anything that may not be normal for your breasts  Do a BSE on one breast at a time  ? Place a small pillow or towel under your left shoulder  Put your left arm behind your head  ? Use the 3 middle fingers of your right hand  Use your fingertip pads, on the top of your fingers  Your fingertip pad is the most sensitive part of your finger  ? Use small circles to feel your breast tissue  Use your fingertip pads to make dime-sized, overlapping circles on your breast and armpits  Use light, medium, and firm pressure  First, press lightly  Second, press with medium pressure to feel a little deeper into the breast  Last, use firm pressure to feel deep within your breast     ? Examine your entire breast area  Examine the breast area from above the breast to below the breast where you feel only ribs  Make small circles with your fingertips, starting in the middle of your armpit  Make circles going up and down the breast area  Continue toward your breast and all the way across it  Examine the area from your armpit all the way over to the middle of your chest (breastbone)  Stop at the middle of your chest     ? Move the pillow or towel to your right shoulder, and put your right arm behind your head  Use the 3 fingertip pads of your left hand, and repeat the above steps to do a BSE on your right breast   What else can I do to check for breast problems or cancer? Talk to your healthcare provider about mammograms  A mammogram is an x-ray of your breasts to screen for breast cancer or other problems  Your provider can tell you the benefits and risks of mammograms  The first mammogram is usually at age 39 or 48  Your provider may recommend you start at 36 or younger if your risk for breast cancer is high  Mammograms usually continue every 1 to 2 years until age 76  When should I call my doctor? · You find any lumps or changes in your breasts      · You have breast pain or fluid coming from your nipples  · You have questions or concerns or concerns about your condition or care  CARE AGREEMENT:   You have the right to help plan your care  Learn about your health condition and how it may be treated  Discuss treatment options with your healthcare providers to decide what care you want to receive  You always have the right to refuse treatment  The above information is an  only  It is not intended as medical advice for individual conditions or treatments  Talk to your doctor, nurse or pharmacist before following any medical regimen to see if it is safe and effective for you  © Copyright [employer] 2021 Information is for End User's use only and may not be sold, redistributed or otherwise used for commercial purposes  All illustrations and images included in CareNotes® are the copyrighted property of A D A M , Inc  or Karan Mansfield  Pap Smear   GENERAL INFORMATION:   What is a Pap smear? A Pap smear, or Pap test, is a procedure to check your cervix for abnormal cells  The cervix is the narrow opening at the bottom of your uterus  The cervix meets the top part of the vagina  How do I prepare for a Pap smear? The best time to schedule the test is right after your period stops  Do not have a Pap smear during your monthly period  Do not have intercourse or put anything in your vagina for 24 hours before your test    What will happen during a Pap smear? · You will lie on your back and place your feet on footrests called stirrups  Your caregiver will gently insert a device called a speculum into your vagina  The speculum is used to spread the walls of your vagina so he can see your cervix  He will use a thin brush or cotton swab to collect cells from the inside of your cervix  · Your caregiver will also collect cells from the surface of your cervix with a plastic or wooden tool called a spatula   He may also gently scrape the upper part of your vagina for a sample  The samples are placed in a container with liquid or on a glass slide  They are sent to a lab and examined for abnormal cells  How often do I need a Pap smear? Pap smears are usually done every 1 to 3 years  You may need a Pap smear more often if you have any of the following:  · Positive test result for the human papillomavirus (HPV)    · Cervical intraepithelial neoplasm or cervical cancer    · HIV    · A weak immune system    · Exposure to diethylstilbestrol (PAVAN) medicine when your mother was pregnant with you  CARE AGREEMENT:   You have the right to help plan your care  Learn about your health condition and how it may be treated  Discuss treatment options with your caregivers to decide what care you want to receive  You always have the right to refuse treatment  The above information is an  only  It is not intended as medical advice for individual conditions or treatments  Talk to your doctor, nurse or pharmacist before following any medical regimen to see if it is safe and effective for you  © 2014 [address] is for End User's use only and may not be sold, redistributed or otherwise used for commercial purposes  All illustrations and images included in CareNotes® are the copyrighted property of [employer]  or Alan Hines

## 2021-06-23 LAB
HPV HR 12 DNA CVX QL NAA+PROBE: NEGATIVE
HPV16 DNA CVX QL NAA+PROBE: NEGATIVE
HPV18 DNA CVX QL NAA+PROBE: NEGATIVE

## 2021-06-24 ENCOUNTER — TELEMEDICINE (OUTPATIENT)
Dept: BEHAVIORAL/MENTAL HEALTH CLINIC | Facility: CLINIC | Age: 36
End: 2021-06-24
Payer: COMMERCIAL

## 2021-06-24 DIAGNOSIS — F33.0 MILD EPISODE OF RECURRENT MAJOR DEPRESSIVE DISORDER (HCC): ICD-10-CM

## 2021-06-24 DIAGNOSIS — F41.1 GENERALIZED ANXIETY DISORDER: Primary | ICD-10-CM

## 2021-06-24 PROCEDURE — 90834 PSYTX W PT 45 MINUTES: CPT | Performed by: SOCIAL WORKER

## 2021-06-24 NOTE — PSYCH
Virtual Regular Visit      Assessment/Plan:    Problem List Items Addressed This Visit     None          Goals addressed in session: Goal 1          Reason for visit is   Chief Complaint   Patient presents with    Virtual Regular Visit        Encounter provider Sheryl Hdez    Provider located at [address] [place] [id number] [phone]      Recent Visits  No visits were found meeting these conditions  Showing recent visits within past 7 days and meeting all other requirements  Future Appointments  No visits were found meeting these conditions  Showing future appointments within next 150 days and meeting all other requirements       The patient was identified by name and date of birth  Donte Fountain was informed that this is a telemedicine visit and that the visit is being conducted through [address] [place] [address] Now and patient was informed that this is a secure, HIPAA-compliant platform  She agrees to proceed     My office door was closed  No one else was in the room  She acknowledged consent and understanding of privacy and security of the video platform  The patient has agreed to participate and understands they can discontinue the visit at any time  Patient is aware this is a billable service  Subjective  Donte Fountain is a 43 y o  female ADDY Lopez stated that things have been going well overall  She stated that and her  recently adopted a new dog  She stated that she has been a positive addition to their home and other dogs  She stated that 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 and is excited for it  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  She discussed ongoing issues in her relationship with her mother  Discussing ways to set and maintain boundaries as well as communicate effectively   Giving supportive therapy  A- Progress- Continuing to work towards completing her treatment goals  P-Continue treatment   HPI     Past Medical History:   Diagnosis Date    Anxiety     Depression     Eating disorder        Past Surgical History:   Procedure Laterality Date    WISDOM TOOTH EXTRACTION         Current Outpatient Medications   Medication Sig Dispense Refill    albuterol (PROAIR HFA) 90 mcg/act inhaler Inhale      azelastine (ASTELIN) 0 1 % nasal spray 2 sprays into each nostril as needed        cetirizine (ZyrTEC) 10 mg tablet Take 1 tablet (10 mg total) by mouth daily (Patient taking differently: Take 10 mg by mouth as needed  ) 90 tablet 3    escitalopram (LEXAPRO) 10 mg tablet Take 1 tablet (10 mg total) by mouth daily 90 tablet 1    hyoscyamine (LEVSIN/SL) 0 125 mg SL tablet Take 1 tablet (0 125 mg total) by mouth every 6 (six) hours as needed for cramping or diarrhea 60 tablet 5    metoprolol succinate (TOPROL-XL) 25 mg 24 hr tablet TAKE ONE TABLET BY MOUTH EVERY DAY 90 tablet 3    multivitamin (THERAGRAN) TABS Take 1 tablet by mouth      omeprazole (PriLOSEC) 40 MG capsule Take 1 capsule (40 mg total) by mouth daily (Patient taking differently: Take 20 mg by mouth daily ) 90 capsule 1     No current facility-administered medications for this visit  Allergies   Allergen Reactions    Bee Venom     Codeine      Other reaction(s): Nausea and/or vomiting    Oxycodone-Acetaminophen Vomiting       Review of Systems    Video Exam    There were no vitals filed for this visit  Physical Exam     I spent 45 minutes directly with the patient during this visit      Rell acknowledges that she has consented to an online visit or consultation   She understands that the online visit is based solely on information provided by her, and that, in the absence of a face-to-face physical evaluation by the physician, the diagnosis she receives is both limited and provisional in terms of accuracy and completeness  This is not intended to replace a full medical face-to-face evaluation by the physician  Isis Choudhary understands and accepts these terms

## 2021-06-25 ENCOUNTER — LAB (OUTPATIENT)
Dept: LAB | Facility: CLINIC | Age: 36
End: 2021-06-25
Payer: COMMERCIAL

## 2021-06-25 DIAGNOSIS — Z00.00 ROUTINE GENERAL MEDICAL EXAMINATION AT A HEALTH CARE FACILITY: ICD-10-CM

## 2021-06-25 DIAGNOSIS — Z00.00 ENCOUNTER FOR PHYSICAL EXAMINATION: ICD-10-CM

## 2021-06-25 LAB
HBV SURFACE AG SER QL: NORMAL
LAB AP GYN PRIMARY INTERPRETATION: NORMAL
Lab: NORMAL
RUBV IGG SERPL IA-ACNC: 56.1 IU/ML

## 2021-06-25 PROCEDURE — 86765 RUBEOLA ANTIBODY: CPT

## 2021-06-25 PROCEDURE — 87340 HEPATITIS B SURFACE AG IA: CPT

## 2021-06-25 PROCEDURE — 86762 RUBELLA ANTIBODY: CPT

## 2021-06-25 PROCEDURE — 36415 COLL VENOUS BLD VENIPUNCTURE: CPT

## 2021-06-25 PROCEDURE — 86787 VARICELLA-ZOSTER ANTIBODY: CPT

## 2021-06-25 PROCEDURE — 86480 TB TEST CELL IMMUN MEASURE: CPT

## 2021-06-25 PROCEDURE — 86735 MUMPS ANTIBODY: CPT

## 2021-06-28 LAB
GAMMA INTERFERON BACKGROUND BLD IA-ACNC: 0.06 IU/ML
M TB IFN-G BLD-IMP: NEGATIVE
M TB IFN-G CD4+ BCKGRND COR BLD-ACNC: -0.03 IU/ML
M TB IFN-G CD4+ BCKGRND COR BLD-ACNC: 0 IU/ML
MITOGEN IGNF BCKGRD COR BLD-ACNC: >10 IU/ML

## 2021-06-29 ENCOUNTER — TELEPHONE (OUTPATIENT)
Dept: FAMILY MEDICINE CLINIC | Facility: CLINIC | Age: 36
End: 2021-06-29

## 2021-06-29 LAB
MEV IGG SER QL: NORMAL
MUV IGG SER QL: NORMAL
VZV IGG SER IA-ACNC: NORMAL

## 2021-06-29 NOTE — RESULT ENCOUNTER NOTE
Please let pt know I have her blood titers and her form is completed  She needs to still fill in the dates of her initial 3 hepatitis vaccine dates on the form  I twill be available for her to  at our new front office

## 2021-06-29 NOTE — TELEPHONE ENCOUNTER
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

## 2021-07-27 ENCOUNTER — TELEPHONE (OUTPATIENT)
Dept: BEHAVIORAL/MENTAL HEALTH CLINIC | Facility: CLINIC | Age: 36
End: 2021-07-27

## 2021-08-02 ENCOUNTER — TELEMEDICINE (OUTPATIENT)
Dept: BEHAVIORAL/MENTAL HEALTH CLINIC | Facility: CLINIC | Age: 36
End: 2021-08-02
Payer: COMMERCIAL

## 2021-08-02 DIAGNOSIS — F41.1 GENERALIZED ANXIETY DISORDER: Primary | ICD-10-CM

## 2021-08-02 DIAGNOSIS — F33.0 MILD EPISODE OF RECURRENT MAJOR DEPRESSIVE DISORDER (HCC): ICD-10-CM

## 2021-08-02 PROCEDURE — 90834 PSYTX W PT 45 MINUTES: CPT | Performed by: SOCIAL WORKER
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
## 2021-09-30 ENCOUNTER — TELEMEDICINE (OUTPATIENT)
Dept: BEHAVIORAL/MENTAL HEALTH CLINIC | Facility: CLINIC | Age: 36
End: 2021-09-30
Payer: COMMERCIAL

## 2021-09-30 DIAGNOSIS — F33.0 MILD EPISODE OF RECURRENT MAJOR DEPRESSIVE DISORDER (HCC): ICD-10-CM

## 2021-09-30 DIAGNOSIS — F41.1 GENERALIZED ANXIETY DISORDER: Primary | ICD-10-CM

## 2021-09-30 PROCEDURE — 90834 PSYTX W PT 45 MINUTES: CPT | Performed by: SOCIAL WORKER

## 2021-09-30 NOTE — PSYCH
Treatment Plan Tracking    # 2Treatment Plan not completed within required time limits due to: Treatment Plan done but not signed at time of office visit due to:  Plan reviewed by phone or in person  and verbal consent given due to [address] distancing

## 2021-09-30 NOTE — PSYCH
Virtual Regular Visit    Verification of patient location:    Patient is located in the following state in which I hold an active license [place]      Assessment/Plan:    Problem List Items Addressed This Visit     None          Goals addressed in session: Goal 1          Reason for visit is   Chief Complaint   Patient presents with    Virtual Regular Visit        Encounter provider Kristin Hernandez    Provider located at [address] [place] [id number] [phone]      Recent Visits  No visits were found meeting these conditions  Showing recent visits within past 7 days and meeting all other requirements  Future Appointments  No visits were found meeting these conditions  Showing future appointments within next 150 days and meeting all other requirements       The patient was identified by name and date of birth  Sharif Johnson was informed that this is a telemedicine visit and that the visit is being conducted through [address] Now and patient was informed that this is a secure, HIPAA-compliant platform  She agrees to proceed     My office door was closed  No one else was in the room  She acknowledged consent and understanding of privacy and security of the video platform  The patient has agreed to participate and understands they can discontinue the visit at any time  Patient is aware this is a billable service  Subjective  Sharif Johnson is a 43 y o  female D- Francesca Dad stated that she has been doing well overall  She stated that she has been focusing mainly in school  She stated that she has been working two part time jobs but will be leaving one of the positions due to feeling overwhelmed  She discussed ongoing issues with her mother and how she has been navigating this  Continuing to discuss stress management and use of healthy coping mechanisms   Reviewing and renewing her treatment goals  Giving supportive therapy  A- Progress- Continuing to work towards competing her treatment goals   P-Continue treatment      HPI     Past Medical History:   Diagnosis Date    Anxiety     Depression     Eating disorder        Past Surgical History:   Procedure Laterality Date    WISDOM TOOTH EXTRACTION         Current Outpatient Medications   Medication Sig Dispense Refill    albuterol (PROAIR HFA) 90 mcg/act inhaler Inhale      azelastine (ASTELIN) 0 1 % nasal spray 2 sprays into each nostril as needed        cetirizine (ZyrTEC) 10 mg tablet Take 1 tablet (10 mg total) by mouth daily (Patient taking differently: Take 10 mg by mouth as needed  ) 90 tablet 3    escitalopram (LEXAPRO) 10 mg tablet TAKE ONE TABLET BY MOUTH EVERY DAY 90 tablet 1    hyoscyamine (LEVSIN/SL) 0 125 mg SL tablet Take 1 tablet (0 125 mg total) by mouth every 6 (six) hours as needed for cramping or diarrhea 60 tablet 5    metoprolol succinate (TOPROL-XL) 25 mg 24 hr tablet TAKE ONE TABLET BY MOUTH EVERY DAY 90 tablet 3    multivitamin (THERAGRAN) TABS Take 1 tablet by mouth      omeprazole (PriLOSEC) 40 MG capsule Take 1 capsule (40 mg total) by mouth daily (Patient taking differently: Take 20 mg by mouth daily ) 90 capsule 1     No current facility-administered medications for this visit  Allergies   Allergen Reactions    Bee Venom     Codeine      Other reaction(s): Nausea and/or vomiting    Oxycodone-Acetaminophen Vomiting       Review of Systems    Video Exam    There were no vitals filed for this visit  Physical Exam     I spent 45 minutes directly with the patient during this visit    VIRTUAL VISIT [address] verbally agrees to participate in [employer]   Pt is aware that [employer] could be limited without vital signs or the ability to perform a full hands-on physical exam  Candi Lord understands she or the provider may request at any time to terminate the video visit and request the patient to seek care or treatment in person

## 2021-09-30 NOTE — BH TREATMENT PLAN
Bernard Gomezde  1985       Date of Initial Treatment Plan: 4/2/21   Date of Current Treatment Plan: 09/30/21    Treatment Plan Number 2     Strengths/Personal Resources for Self Care: Creative, organized, loyal, caring, intelligent    Diagnosis:   1  Generalized anxiety disorder     2  Mild episode of recurrent major depressive disorder ([facility] [address] )         Area of Needs:   Grief/loss  Stress     Long Term Goal 1: Be able to cope with the losses        Target Date: 3/30/22  Completion Date: TBD         Short Term Objectives for Goal 1:       Be able to talk about it      Do not minimize what I am feeling      Use my support system      Set boundaries with mom        Long Term Goal 2: Be able to manage my stress           Target Date: 3/30/22  Completion Date: TBD     Short Term Objectives for Goal 2:   Set boundaries with others  Be able to identify my triggers  Use healthy coping mechanisms for stress                 GOAL 1: Modality: Individual 1-2x per month   Completion Date TBD and The person(s) responsible for carrying out the plan is [place]     GOAL 2: Modality: Individual 1-2x per month   Completion Date TBD and The person(s) responsible for carrying out the plan is [employer]: Diagnosis and Treatment Plan explained to Stevan Montesinos relates understanding diagnosis and is agreeable to Treatment Plan         Client Comments : Please share your thoughts, feelings, need and/or experiences regarding your treatment plan: None

## 2021-10-28 ENCOUNTER — TELEMEDICINE (OUTPATIENT)
Dept: BEHAVIORAL/MENTAL HEALTH CLINIC | Facility: CLINIC | Age: 36
End: 2021-10-28
Payer: COMMERCIAL

## 2021-10-28 DIAGNOSIS — F33.0 MILD EPISODE OF RECURRENT MAJOR DEPRESSIVE DISORDER (HCC): ICD-10-CM

## 2021-10-28 DIAGNOSIS — F41.1 GENERALIZED ANXIETY DISORDER: Primary | ICD-10-CM

## 2021-10-28 PROCEDURE — 90834 PSYTX W PT 45 MINUTES: CPT | Performed by: SOCIAL WORKER

## 2021-11-29 ENCOUNTER — TELEPHONE (OUTPATIENT)
Dept: BEHAVIORAL/MENTAL HEALTH CLINIC | Facility: CLINIC | Age: 36
End: 2021-11-29

## 2021-11-30 ENCOUNTER — TELEPHONE (OUTPATIENT)
Dept: PSYCHIATRY | Facility: CLINIC | Age: 36
End: 2021-11-30

## 2021-12-07 ENCOUNTER — TELEPHONE (OUTPATIENT)
Dept: PSYCHIATRY | Facility: CLINIC | Age: 36
End: 2021-12-07

## 2021-12-09 ENCOUNTER — TELEPHONE (OUTPATIENT)
Dept: BEHAVIORAL/MENTAL HEALTH CLINIC | Facility: CLINIC | Age: 36
End: 2021-12-09

## 2021-12-17 ENCOUNTER — TELEMEDICINE (OUTPATIENT)
Dept: BEHAVIORAL/MENTAL HEALTH CLINIC | Facility: CLINIC | Age: 36
End: 2021-12-17
Payer: COMMERCIAL

## 2021-12-17 DIAGNOSIS — F33.0 MILD EPISODE OF RECURRENT MAJOR DEPRESSIVE DISORDER (HCC): ICD-10-CM

## 2021-12-17 DIAGNOSIS — F41.1 GENERALIZED ANXIETY DISORDER: Primary | ICD-10-CM

## 2021-12-17 PROCEDURE — 90834 PSYTX W PT 45 MINUTES: CPT | Performed by: SOCIAL WORKER

## 2021-12-29 DIAGNOSIS — Z20.822 CLOSE EXPOSURE TO COVID-19 VIRUS: Primary | ICD-10-CM

## 2021-12-29 PROCEDURE — U0005 INFEC AGEN DETEC AMPLI PROBE: HCPCS | Performed by: FAMILY MEDICINE

## 2021-12-29 PROCEDURE — U0003 INFECTIOUS AGENT DETECTION BY NUCLEIC ACID (DNA OR RNA); SEVERE ACUTE RESPIRATORY SYNDROME CORONAVIRUS 2 (SARS-COV-2) (CORONAVIRUS DISEASE [COVID-19]), AMPLIFIED PROBE TECHNIQUE, MAKING USE OF HIGH THROUGHPUT TECHNOLOGIES AS DESCRIBED BY CMS-2020-01-R: HCPCS | Performed by: FAMILY MEDICINE

## 2022-01-20 ENCOUNTER — TELEMEDICINE (OUTPATIENT)
Dept: BEHAVIORAL/MENTAL HEALTH CLINIC | Facility: CLINIC | Age: 37
End: 2022-01-20

## 2022-01-20 DIAGNOSIS — F33.0 MILD EPISODE OF RECURRENT MAJOR DEPRESSIVE DISORDER (HCC): ICD-10-CM

## 2022-01-20 DIAGNOSIS — F41.1 GENERALIZED ANXIETY DISORDER: Primary | ICD-10-CM

## 2022-01-20 PROCEDURE — 90834 PSYTX W PT 45 MINUTES: CPT | Performed by: SOCIAL WORKER

## 2022-01-20 NOTE — PSYCH
Virtual Regular Visit    Verification of patient location:    Patient is located in the following state in which I hold an active license [place]      Assessment/Plan:    Problem List Items Addressed This Visit     None          Goals addressed in session: Goal 1          Reason for visit is   Chief Complaint   Patient presents with    Virtual Regular Visit        Encounter provider Valente Al    Provider located at [address] [place] [id number] [phone]      Recent Visits  No visits were found meeting these conditions  Showing recent visits within past 7 days and meeting all other requirements  Future Appointments  No visits were found meeting these conditions  Showing future appointments within next 150 days and meeting all other requirements       The patient was identified by name and date of birth  Irlanda Mackenzie was informed that this is a telemedicine visit and that the visit is being conducted through [facility] and patient was informed this is a secure, HIPAA-complaint platform  She agrees to proceed     My office door was closed  The patient was notified the following individuals were present in the room new staff shadowing  She acknowledged consent and understanding of privacy and security of the video platform  The patient has agreed to participate and understands they can discontinue the visit at any time  Patient is aware this is a billable service  Subjective  Irlanda Mackenzie is a 43 y o  female D- Yomi Mews stated that things have been very janee in her relationship with he  over the last month  She stated that they continued to argue and finally were able to sit down and communicate regarding each of their perception of the issues  She discussed each of their perceptions and their potential path forward   Discussing ways for her to increase her communication with him as well as being able to set boundaries and not feel as though she is "walking on egg shells"  Discussing how their histories affect their relationship and ways to navigate this  Giving supportive therapy  A- Progress- continuing to work towards completing her treatment goals  P-Continue treatment   HPI     Past Medical History:   Diagnosis Date    Anxiety     Depression     Eating disorder        Past Surgical History:   Procedure Laterality Date    WISDOM TOOTH EXTRACTION         Current Outpatient Medications   Medication Sig Dispense Refill    albuterol (PROAIR HFA) 90 mcg/act inhaler Inhale      azelastine (ASTELIN) 0 1 % nasal spray 2 sprays into each nostril as needed        cetirizine (ZyrTEC) 10 mg tablet Take 1 tablet (10 mg total) by mouth daily (Patient taking differently: Take 10 mg by mouth as needed  ) 90 tablet 3    escitalopram (LEXAPRO) 10 mg tablet TAKE ONE TABLET BY MOUTH EVERY DAY 90 tablet 1    hyoscyamine (LEVSIN/SL) 0 125 mg SL tablet Take 1 tablet (0 125 mg total) by mouth every 6 (six) hours as needed for cramping or diarrhea 60 tablet 5    metoprolol succinate (TOPROL-XL) 25 mg 24 hr tablet TAKE ONE TABLET BY MOUTH EVERY DAY 90 tablet 3    multivitamin (THERAGRAN) TABS Take 1 tablet by mouth      omeprazole (PriLOSEC) 40 MG capsule Take 1 capsule (40 mg total) by mouth daily (Patient taking differently: Take 20 mg by mouth daily ) 90 capsule 1     No current facility-administered medications for this visit  Allergies   Allergen Reactions    Bee Venom     Codeine      Other reaction(s): Nausea and/or vomiting    Oxycodone-Acetaminophen Vomiting       Review of Systems    Video Exam    There were no vitals filed for this visit  Physical Exam     I spent 45 minutes directly with the patient during this visit    VIRTUAL VISIT [facility] verbally agrees to participate in [employer]   Pt is aware that Virtual Care Services could be limited without vital signs or the ability to perform a full hands-on physical Darcyne Ronnie understands she or the provider may request at any time to terminate the video visit and request the patient to seek care or treatment in person

## 2022-02-18 ENCOUNTER — TELEMEDICINE (OUTPATIENT)
Dept: BEHAVIORAL/MENTAL HEALTH CLINIC | Facility: CLINIC | Age: 37
End: 2022-02-18

## 2022-02-18 DIAGNOSIS — F33.0 MILD EPISODE OF RECURRENT MAJOR DEPRESSIVE DISORDER (HCC): ICD-10-CM

## 2022-02-18 DIAGNOSIS — F41.1 GENERALIZED ANXIETY DISORDER: Primary | ICD-10-CM

## 2022-02-18 PROCEDURE — 90834 PSYTX W PT 45 MINUTES: CPT | Performed by: SOCIAL WORKER

## 2022-02-18 NOTE — PSYCH
Virtual Regular Visit    Verification of patient location:    Patient is located in the following state in which I hold an active license [place]      Assessment/Plan:    Problem List Items Addressed This Visit     None          Goals addressed in session: Goal 1          Reason for visit is   Chief Complaint   Patient presents with    Virtual Regular Visit        Encounter provider Michaela Asher    Provider located at [address] [place] [id number] [phone]      Recent Visits  No visits were found meeting these conditions  Showing recent visits within past 7 days and meeting all other requirements  Future Appointments  No visits were found meeting these conditions  Showing future appointments within next 150 days and meeting all other requirements       The patient was identified by name and date of birth  Mayte Clulen was informed that this is a telemedicine visit and that the visit is being conducted through [facility] and patient was informed this is a secure, HIPAA-complaint platform  She agrees to proceed     My office door was closed  No one else was in the room  She acknowledged consent and understanding of privacy and security of the video platform  The patient has agreed to participate and understands they can discontinue the visit at any time  Patient is aware this is a billable service  Subjective  Mayte Cullen is a 43 y o  female ADDY Valerio stated that she and her  have been diagnosed with COVID  She stated that she has felt poorly for the past two days  She discussed ongoing issues in her relationship wit her  related to finances and his belief that she is attempting to be manipulative  She also discussed his anger towards her if tasks are not completed in his desired time line   Processing her emotions and discussing ways for her to continue to communicate wit her   Also discussing setting boundaries when his behavior becomes negative and accusatory  Discussing ways for her to continue to navigate these issues and be able to problem solve  Giving supportive therapy  A- Progress- Continuing to work towards completing her treatment goals  P-Continue treatment   HPI     Past Medical History:   Diagnosis Date    Anxiety     Depression     Eating disorder        Past Surgical History:   Procedure Laterality Date    WISDOM TOOTH EXTRACTION         Current Outpatient Medications   Medication Sig Dispense Refill    albuterol (PROAIR HFA) 90 mcg/act inhaler Inhale      azelastine (ASTELIN) 0 1 % nasal spray 2 sprays into each nostril as needed        cetirizine (ZyrTEC) 10 mg tablet Take 1 tablet (10 mg total) by mouth daily (Patient taking differently: Take 10 mg by mouth as needed  ) 90 tablet 3    escitalopram (LEXAPRO) 10 mg tablet TAKE ONE TABLET BY MOUTH EVERY DAY 90 tablet 1    hyoscyamine (LEVSIN/SL) 0 125 mg SL tablet Take 1 tablet (0 125 mg total) by mouth every 6 (six) hours as needed for cramping or diarrhea 60 tablet 5    metoprolol succinate (TOPROL-XL) 25 mg 24 hr tablet TAKE ONE TABLET BY MOUTH EVERY DAY 90 tablet 3    multivitamin (THERAGRAN) TABS Take 1 tablet by mouth      omeprazole (PriLOSEC) 40 MG capsule Take 1 capsule (40 mg total) by mouth daily (Patient taking differently: Take 20 mg by mouth daily ) 90 capsule 1     No current facility-administered medications for this visit  Allergies   Allergen Reactions    Bee Venom     Codeine      Other reaction(s): Nausea and/or vomiting    Oxycodone-Acetaminophen Vomiting       Review of Systems    Video Exam    There were no vitals filed for this visit  Physical Exam     I spent 45 minutes directly with the patient during this visit    VIRTUAL VISIT [facility] verbally agrees to participate in [employer]   Pt is aware that Virtual Care Services could be limited without vital signs or the ability to perform a full hands-on physical Roger Reef understands she or the provider may request at any time to terminate the video visit and request the patient to seek care or treatment in person

## 2022-04-18 ENCOUNTER — TELEPHONE (OUTPATIENT)
Dept: FAMILY MEDICINE CLINIC | Facility: CLINIC | Age: 37
End: 2022-04-18

## 2022-04-18 DIAGNOSIS — R00.2 HEART PALPITATIONS: ICD-10-CM

## 2022-04-18 DIAGNOSIS — R03.0 ELEVATED BLOOD PRESSURE READING: Primary | ICD-10-CM

## 2022-04-18 DIAGNOSIS — Z13.220 LIPID SCREENING: ICD-10-CM

## 2022-04-18 DIAGNOSIS — F41.1 GENERALIZED ANXIETY DISORDER: ICD-10-CM

## 2022-04-18 NOTE — TELEPHONE ENCOUNTER
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  Thanks

## 2022-04-19 ENCOUNTER — TELEMEDICINE (OUTPATIENT)
Dept: BEHAVIORAL/MENTAL HEALTH CLINIC | Facility: CLINIC | Age: 37
End: 2022-04-19
Payer: COMMERCIAL

## 2022-04-19 DIAGNOSIS — F41.1 GENERALIZED ANXIETY DISORDER: Primary | ICD-10-CM

## 2022-04-19 PROCEDURE — 90834 PSYTX W PT 45 MINUTES: CPT | Performed by: SOCIAL WORKER

## 2022-04-19 NOTE — BH TREATMENT PLAN
Nic Silver  1985       Date of Initial Treatment Plan: 4/2/21   Date of Current Treatment Plan: 04/19/22    Treatment Plan Number 3     Strengths/Personal Resources for Self Care: Creative, organized, loyal, caring, intelligent    Diagnosis:   1  Generalized anxiety disorder       Area of Needs:   Grief/loss  Stress     Long Term Goal 1: Be able to cope with the losses        Target Date: 10/19/22  Completion Date: TBD         Short Term Objectives for Goal 1:       Be able to talk about it      Do not minimize what I am feeling      Use my support system      Set boundaries with mom        Long Term Goal 2: Be able to manage my stress           Target Date: 10/19/22  Completion Date: TBD     Short Term Objectives for Goal 2:   Set boundaries with others  Be able to identify my triggers  Use healthy coping mechanisms for stress                 GOAL 1: Modality: Individual 1-2x per month   Completion Date TBD and The person(s) responsible for carrying out the plan is [place]     GOAL 2: Modality: Individual 1-2x per month   Completion Date TBD and The person(s) responsible for carrying out the plan is [employer]: Diagnosis and Treatment Plan explained to Toribio Bernard relates understanding diagnosis and is agreeable to Treatment Plan         Client Comments : Please share your thoughts, feelings, need and/or experiences regarding your treatment plan: None

## 2022-04-19 NOTE — TELEPHONE ENCOUNTER
Problem List Items Addressed This Visit     Anxiety disorder    Relevant Orders    Comprehensive metabolic panel    CBC and differential    TSH, 3rd generation    Elevated blood pressure reading - Primary    Relevant Orders    Comprehensive metabolic panel    CBC and differential    TSH, 3rd generation    Heart palpitations    Relevant Orders    Comprehensive metabolic panel    CBC and differential    TSH, 3rd generation      Other Visit Diagnoses     Lipid screening        Relevant Orders    Lipid Panel with Direct LDL reflex

## 2022-04-19 NOTE — PSYCH
Treatment Plan Tracking    # 3 Treatment Plan not completed within required time limits due to: Treatment Plan [facility] done but not signed at time of office visit due to:  Plan reviewed by phone or in person  and verbal consent given due to [employer] social chi Late due to scheduling

## 2022-04-19 NOTE — PSYCH
Virtual Regular Visit    Verification of patient location:    Patient is located in the following state in which I hold an active license [place]      Assessment/Plan:    Problem List Items Addressed This Visit     None          Goals addressed in session: Goal 1          Reason for visit is   Chief Complaint   Patient presents with    Virtual Regular Visit        Encounter provider Demario Campbell    Provider located at [address] [place] [address] Easton Gonzalez [address] [id number] [phone]      Recent Visits  No visits were found meeting these conditions  Showing recent visits within past 7 days and meeting all other requirements  Future Appointments  No visits were found meeting these conditions  Showing future appointments within next 150 days and meeting all other requirements       The patient was identified by name and date of birth  Zulma Castro was informed that this is a telemedicine visit and that the visit is being conducted through [facility] and patient was informed this is a secure, HIPAA-complaint platform  She agrees to proceed     My office door was closed  No one else was in the room  She acknowledged consent and understanding of privacy and security of the video platform  The patient has agreed to participate and understands they can discontinue the visit at any time  Patient is aware this is a billable service  Subjective  Zulma Castro is a 42 y o  female MICHAEL- Linda Oar stated that she has been ill for several weeks  She stated that she has been under a large amount of stress with school  She shared that she and her  have been communicating well and that things are better between them  She stated that he has been more kind and loving towards her  She stated that they had move forward with the home equity loan which hs helped then to pay off their debt   She stated that she has been making a conscious effort to be supportive even when he is not in the best of moods  She feels that her efforts have helped in moving down a more positive path with their relationship  She also discussed her relationships with her family members and feels that they have also been more positive  Continuing to discuss building and maintain healthy relationships in her life and also continuing to practice self care  Reviewing and renewing her treatment goals  Giving supportive therapy  A- Progress- Continuing to work towards completing her treatment goals  P- Continue treatment   HPI     Past Medical History:   Diagnosis Date    Anxiety     Depression     Eating disorder        Past Surgical History:   Procedure Laterality Date    WISDOM TOOTH EXTRACTION         Current Outpatient Medications   Medication Sig Dispense Refill    albuterol (PROAIR HFA) 90 mcg/act inhaler Inhale      azelastine (ASTELIN) 0 1 % nasal spray 2 sprays into each nostril as needed        cetirizine (ZyrTEC) 10 mg tablet Take 1 tablet (10 mg total) by mouth daily (Patient taking differently: Take 10 mg by mouth as needed  ) 90 tablet 3    escitalopram (LEXAPRO) 10 mg tablet TAKE ONE TABLET BY MOUTH EVERY DAY 90 tablet 0    hyoscyamine (LEVSIN/SL) 0 125 mg SL tablet Take 1 tablet (0 125 mg total) by mouth every 6 (six) hours as needed for cramping or diarrhea 60 tablet 5    metoprolol succinate (TOPROL-XL) 25 mg 24 hr tablet TAKE ONE TABLET BY MOUTH EVERY DAY 90 tablet 3    multivitamin (THERAGRAN) TABS Take 1 tablet by mouth      omeprazole (PriLOSEC) 40 MG capsule Take 1 capsule (40 mg total) by mouth daily (Patient taking differently: Take 20 mg by mouth daily ) 90 capsule 1     No current facility-administered medications for this visit          Allergies   Allergen Reactions    Bee Venom     Codeine      Other reaction(s): Nausea and/or vomiting    Oxycodone-Acetaminophen Vomiting       Review of Systems    Video Exam    There were no vitals filed for this visit  Physical Exam     I spent 30 minutes directly with the patient during this visit    VIRTUAL VISIT [address] verbally agrees to participate in [employer]  Pt is aware that [employer] could be limited without vital signs or the ability to perform a full hands-on physical José Miguel Connor understands she or the provider may request at any time to terminate the video visit and request the patient to seek care or treatment in person

## 2022-04-21 ENCOUNTER — APPOINTMENT (OUTPATIENT)
Dept: LAB | Facility: CLINIC | Age: 37
End: 2022-04-21
Payer: COMMERCIAL

## 2022-04-21 DIAGNOSIS — F41.1 GENERALIZED ANXIETY DISORDER: ICD-10-CM

## 2022-04-21 DIAGNOSIS — Z13.220 LIPID SCREENING: ICD-10-CM

## 2022-04-21 DIAGNOSIS — R00.2 HEART PALPITATIONS: ICD-10-CM

## 2022-04-21 DIAGNOSIS — R03.0 ELEVATED BLOOD PRESSURE READING: ICD-10-CM

## 2022-04-21 LAB
ALBUMIN SERPL BCP-MCNC: 3.9 G/DL (ref 3.5–5)
ALP SERPL-CCNC: 76 U/L (ref 46–116)
ALT SERPL W P-5'-P-CCNC: 33 U/L (ref 12–78)
ANION GAP SERPL CALCULATED.3IONS-SCNC: 5 MMOL/L (ref 4–13)
AST SERPL W P-5'-P-CCNC: 33 U/L (ref 5–45)
BASOPHILS # BLD AUTO: 0.03 THOUSANDS/ΜL (ref 0–0.1)
BASOPHILS NFR BLD AUTO: 1 % (ref 0–1)
BILIRUB SERPL-MCNC: 0.49 MG/DL (ref 0.2–1)
BUN SERPL-MCNC: 14 MG/DL (ref 5–25)
CALCIUM SERPL-MCNC: 9 MG/DL (ref 8.3–10.1)
CHLORIDE SERPL-SCNC: 105 MMOL/L (ref 100–108)
CHOLEST SERPL-MCNC: 202 MG/DL
CO2 SERPL-SCNC: 28 MMOL/L (ref 21–32)
CREAT SERPL-MCNC: 0.84 MG/DL (ref 0.6–1.3)
EOSINOPHIL # BLD AUTO: 0.25 THOUSAND/ΜL (ref 0–0.61)
EOSINOPHIL NFR BLD AUTO: 4 % (ref 0–6)
ERYTHROCYTE [DISTWIDTH] IN BLOOD BY AUTOMATED COUNT: 12.2 % (ref 11.6–15.1)
GFR SERPL CREATININE-BSD FRML MDRD: 87 ML/MIN/1.73SQ M
GLUCOSE P FAST SERPL-MCNC: 125 MG/DL (ref 65–99)
HCT VFR BLD AUTO: 48 % (ref 34.8–46.1)
HDLC SERPL-MCNC: 81 MG/DL
HGB BLD-MCNC: 16.2 G/DL (ref 11.5–15.4)
IMM GRANULOCYTES # BLD AUTO: 0.03 THOUSAND/UL (ref 0–0.2)
IMM GRANULOCYTES NFR BLD AUTO: 1 % (ref 0–2)
LDLC SERPL CALC-MCNC: 105 MG/DL (ref 0–100)
LYMPHOCYTES # BLD AUTO: 1.73 THOUSANDS/ΜL (ref 0.6–4.47)
LYMPHOCYTES NFR BLD AUTO: 28 % (ref 14–44)
MCH RBC QN AUTO: 33.1 PG (ref 26.8–34.3)
MCHC RBC AUTO-ENTMCNC: 33.8 G/DL (ref 31.4–37.4)
MCV RBC AUTO: 98 FL (ref 82–98)
MONOCYTES # BLD AUTO: 0.65 THOUSAND/ΜL (ref 0.17–1.22)
MONOCYTES NFR BLD AUTO: 10 % (ref 4–12)
NEUTROPHILS # BLD AUTO: 3.59 THOUSANDS/ΜL (ref 1.85–7.62)
NEUTS SEG NFR BLD AUTO: 56 % (ref 43–75)
NRBC BLD AUTO-RTO: 0 /100 WBCS
PLATELET # BLD AUTO: 260 THOUSANDS/UL (ref 149–390)
PMV BLD AUTO: 10 FL (ref 8.9–12.7)
POTASSIUM SERPL-SCNC: 4 MMOL/L (ref 3.5–5.3)
PROT SERPL-MCNC: 7.5 G/DL (ref 6.4–8.2)
RBC # BLD AUTO: 4.9 MILLION/UL (ref 3.81–5.12)
SODIUM SERPL-SCNC: 138 MMOL/L (ref 136–145)
TRIGL SERPL-MCNC: 80 MG/DL
TSH SERPL DL<=0.05 MIU/L-ACNC: 1.06 UIU/ML (ref 0.45–4.5)
WBC # BLD AUTO: 6.28 THOUSAND/UL (ref 4.31–10.16)

## 2022-04-21 PROCEDURE — 84443 ASSAY THYROID STIM HORMONE: CPT

## 2022-04-21 PROCEDURE — 80061 LIPID PANEL: CPT

## 2022-04-21 PROCEDURE — 36415 COLL VENOUS BLD VENIPUNCTURE: CPT

## 2022-04-21 PROCEDURE — 80053 COMPREHEN METABOLIC PANEL: CPT

## 2022-04-21 PROCEDURE — 85025 COMPLETE CBC W/AUTO DIFF WBC: CPT

## 2022-04-22 ENCOUNTER — OFFICE VISIT (OUTPATIENT)
Dept: FAMILY MEDICINE CLINIC | Facility: CLINIC | Age: 37
End: 2022-04-22
Payer: COMMERCIAL

## 2022-04-22 VITALS
HEIGHT: 68 IN | RESPIRATION RATE: 16 BRPM | WEIGHT: 165.4 LBS | SYSTOLIC BLOOD PRESSURE: 122 MMHG | TEMPERATURE: 98.2 F | HEART RATE: 99 BPM | DIASTOLIC BLOOD PRESSURE: 90 MMHG | BODY MASS INDEX: 25.07 KG/M2

## 2022-04-22 DIAGNOSIS — R73.9 HYPERGLYCEMIA: ICD-10-CM

## 2022-04-22 DIAGNOSIS — J06.9 ACUTE URI: ICD-10-CM

## 2022-04-22 DIAGNOSIS — R00.2 HEART PALPITATIONS: ICD-10-CM

## 2022-04-22 DIAGNOSIS — K21.9 GASTROESOPHAGEAL REFLUX DISEASE WITHOUT ESOPHAGITIS: ICD-10-CM

## 2022-04-22 DIAGNOSIS — F32.9 MAJOR DEPRESSIVE DISORDER WITH CURRENT ACTIVE EPISODE, UNSPECIFIED DEPRESSION EPISODE SEVERITY, UNSPECIFIED WHETHER RECURRENT: ICD-10-CM

## 2022-04-22 DIAGNOSIS — J45.20 MILD INTERMITTENT ASTHMA WITHOUT COMPLICATION: ICD-10-CM

## 2022-04-22 DIAGNOSIS — K58.0 IRRITABLE BOWEL SYNDROME WITH DIARRHEA: ICD-10-CM

## 2022-04-22 DIAGNOSIS — F41.1 GENERALIZED ANXIETY DISORDER: Primary | ICD-10-CM

## 2022-04-22 DIAGNOSIS — J30.89 NON-SEASONAL ALLERGIC RHINITIS, UNSPECIFIED TRIGGER: ICD-10-CM

## 2022-04-22 PROBLEM — Z86.16 HISTORY OF COVID-19: Status: ACTIVE | Noted: 2022-04-22

## 2022-04-22 LAB — SL AMB POCT HEMOGLOBIN AIC: 4.8 (ref ?–6.5)

## 2022-04-22 PROCEDURE — 83036 HEMOGLOBIN GLYCOSYLATED A1C: CPT | Performed by: PHYSICIAN ASSISTANT

## 2022-04-22 PROCEDURE — 99214 OFFICE O/P EST MOD 30 MIN: CPT | Performed by: PHYSICIAN ASSISTANT

## 2022-04-22 RX ORDER — ESCITALOPRAM OXALATE 10 MG/1
10 TABLET ORAL DAILY
Qty: 90 TABLET | Refills: 3 | Status: SHIPPED | OUTPATIENT
Start: 2022-04-22

## 2022-04-22 RX ORDER — CEFUROXIME AXETIL 250 MG/1
250 TABLET ORAL EVERY 12 HOURS SCHEDULED
Qty: 20 TABLET | Refills: 0 | Status: SHIPPED | OUTPATIENT
Start: 2022-04-22 | End: 2022-05-02

## 2022-04-22 RX ORDER — METOPROLOL SUCCINATE 25 MG/1
25 TABLET, EXTENDED RELEASE ORAL DAILY
Qty: 90 TABLET | Refills: 3 | Status: SHIPPED | OUTPATIENT
Start: 2022-04-22

## 2022-04-22 NOTE — PROGRESS NOTES
Assessment and Plan:    Problem List Items Addressed This Visit        Digestive    GERD (gastroesophageal reflux disease)     Stable on daily PPI  Urged to go every other day if able  Irritable bowel syndrome with diarrhea     Stable with rare as needed Levsin  Respiratory    Allergic rhinitis     Continue as needed Zyrtec and Astelin         Asthma     Very stable with only infrequent as needed rescue inhaler  Acute URI     Most likely early sinusitis as has colored mucus and negative covid  Tried and failed all OTC cold meds  Ceftin 250 mg as directed  Avoid decongestants as this is elevating patient's diastolic BP  Relevant Medications    cefuroxime (CEFTIN) 250 mg tablet       Other    Anxiety disorder - Primary     Very stable on Lexapro 10 mg once daily  Refills given  Relevant Medications    escitalopram (LEXAPRO) 10 mg tablet    Heart palpitations     Stable with as needed Toprol  CMP reviewed refills given  Check 6 months  Relevant Medications    metoprolol succinate (TOPROL-XL) 25 mg 24 hr tablet    Depression     No SI or HI stable on Lexapro 10 mg once daily  Refills given  Relevant Medications    escitalopram (LEXAPRO) 10 mg tablet    Hyperglycemia     Patient with a fasting sugar of 125 with an A1c fingerstick today of 4 8  This is very unusual for the patient although she did gain 40 lb after losing her job and is working on getting this back down  Relevant Orders    POCT hemoglobin A1c (Completed)    Hemoglobin A1C    Glucose, fasting                 Diagnoses and all orders for this visit:    Generalized anxiety disorder  -     escitalopram (LEXAPRO) 10 mg tablet;  Take 1 tablet (10 mg total) by mouth daily    Mild intermittent asthma without complication    Major depressive disorder with current active episode, unspecified depression episode severity, unspecified whether recurrent    Heart palpitations  -     metoprolol succinate (TOPROL-XL) 25 mg 24 hr tablet; Take 1 tablet (25 mg total) by mouth daily    Gastroesophageal reflux disease without esophagitis    Irritable bowel syndrome with diarrhea    Non-seasonal allergic rhinitis, unspecified trigger    Hyperglycemia  -     POCT hemoglobin A1c  -     Hemoglobin A1C; Future  -     Glucose, fasting; Future    Acute URI  -     cefuroxime (CEFTIN) 250 mg tablet; Take 1 tablet (250 mg total) by mouth every 12 (twelve) hours for 10 days            Subjective:      Patient ID: Norman Whitehead is a 42 y o  female  CC:    Chief Complaint   Patient presents with    Follow-up     Patient in office for follow up    Cold Like Symptoms     Patient here for cold like symptoms of cough with phleghm, sore throat, post nasal drip, sinus pressure behind nose, sinuss pain behind nose and congestion on set [place]       HPI:      Norman Whitehead is here for chronic conditions f/u including the diagnosis of Generalized anxiety disorder  (primary encounter diagnosis)  Mild intermittent asthma without complication  Major depressive disorder with current active episode, unspecified depression episode severity, unspecified whether recurrent  Heart palpitations  Gastroesophageal reflux disease without esophagitis  Irritable bowel syndrome with diarrhea  Non-seasonal allergic rhinitis, unspecified trigger  Hyperglycemia  Acute uri   Pt  states they are taking all medications as directed without complaints or side effects   Pt  had labs done prior to today's visit which included Recent Results (from the past 672 hour(s))  -Comprehensive metabolic panel:   Collection Time: 04/21/22 11:37 AM       Result                      Value             Ref Range           Sodium                      138               136 - 145 mm*       Potassium                   4 0               3 5 - 5 3 mm*       Chloride                    105               100 - 108 mm*       CO2                         28 21 - 32 mmol*       ANION GAP                   5                 4 - 13 mmol/L       BUN                         14                5 - 25 mg/dL        Creatinine                  0 84              0 60 - 1 30 *       Glucose, Fasting            125 (H)           65 - 99 mg/dL       Calcium                     9 0               8 3 - 10 1 m*       AST                         33                5 - 45 U/L          ALT                         33                12 - 78 U/L         Alkaline Phosphatase        76                46 - 116 U/L        Total Protein               7 5               6 4 - 8 2 g/*       Albumin                     3 9               3 5 - 5 0 g/*       Total Bilirubin             0 49              0 20 - 1 00 *       eGFR                        87                ml/min/1 73s*  -CBC and differential:   Collection Time: 04/21/22 11:37 AM       Result                      Value             Ref Range           WBC                         6 28              4 31 - 10 16*       RBC                         4 90              3 81 - 5 12 *       Hemoglobin                  16 2 (H)          11 5 - 15 4 *       Hematocrit                  48 0 (H)          34 8 - 46 1 %       MCV                         98                82 - 98 fL          MCH                         33 1              26 8 - 34 3 *       MCHC                        33 8              31 4 - 37 4 *       RDW                         12 2              11 6 - 15 1 %       MPV                         10 0              8 9 - 12 7 fL       Platelets                   260               149 - 390 Th*       nRBC                        0                 /100 WBCs           Neutrophils Relative        56                43 - 75 %           Immat GRANS %               1                 0 - 2 %             Lymphocytes Relative        28                14 - 44 %           Monocytes Relative          10                4 - 12 % Eosinophils Relative        4                 0 - 6 %             Basophils Relative          1                 0 - 1 %             Neutrophils Absolute        3 59              1 85 - 7 62 *       Immature Grans Absolute     0 03              0 00 - 0 20 *       Lymphocytes Absolute        1 73              0 60 - 4 47 *       Monocytes Absolute          0 65              0 17 - 1 22 *       Eosinophils Absolute        0 25              0 00 - 0 61 *       Basophils Absolute          0 03              0 00 - 0 10 *  -Lipid Panel with Direct LDL reflex:   Collection Time: 04/21/22 11:37 AM       Result                      Value             Ref Range           Cholesterol                 202 (H)           See Comment *       Triglycerides               80                See Comment *       HDL, Direct                 81                >=50 mg/dL          LDL Calculated              105 (H)           0 - 100 mg/dL  -TSH, 3rd generation:   Collection Time: 04/21/22 11:37 AM       Result                      Value             Ref Range           TSH 3RD GENERATON           1 060             0 450 - 4 50*  -POCT hemoglobin A1c:   Collection Time: 04/22/22  9:16 AM       Result                      Value             Ref Range           Hemoglobin A1C              4 8               6 5                  The following portions of the patient's history were reviewed and updated as appropriate: allergies, current medications, past family history, past medical history, past social history, past surgical history and problem list       Review of Systems   Constitutional: Negative  Negative for appetite change, chills, diaphoresis, fatigue and fever  HENT: Positive for postnasal drip, sinus pressure, sinus pain and sore throat  Negative for congestion, ear pain, facial swelling, rhinorrhea, trouble swallowing and voice change  Eyes: Negative  Respiratory: Positive for cough   Negative for chest tightness, shortness of breath and wheezing  Cardiovascular: Negative  Negative for chest pain  Gastrointestinal: Negative  Negative for abdominal pain, diarrhea, nausea and vomiting  Endocrine: Negative  Genitourinary: Negative  Musculoskeletal: Negative  Negative for myalgias  Skin: Negative  Allergic/Immunologic: Negative  Neurological: Negative  Negative for headaches  Hematological: Negative  Psychiatric/Behavioral: Negative  Data to review:       Objective:    Vitals:    04/22/22 0906   BP: 122/90   BP Location: Left arm   Patient Position: Sitting   Cuff Size: Adult   Pulse: 99   Resp: 16   Temp: 98 2 °F (36 8 °C)   TempSrc: Temporal   Weight: 75 kg (165 lb 6 4 oz)   Height: 5' 8" (1 727 m)        Physical Exam  Vitals and nursing note reviewed  Constitutional:       Appearance: Normal appearance  She is well-developed  HENT:      Head: Normocephalic and atraumatic  Right Ear: Hearing, tympanic membrane, ear canal and external ear normal       Left Ear: Hearing, tympanic membrane, ear canal and external ear normal       Nose: Mucosal edema and congestion present  Comments: Turbinates erythematous and raw looking  No exudate     Mouth/Throat:      Mouth: Mucous membranes are moist       Pharynx: Oropharynx is clear  Eyes:      General: Lids are normal       Conjunctiva/sclera: Conjunctivae normal       Pupils: Pupils are equal, round, and reactive to light  Cardiovascular:      Rate and Rhythm: Normal rate and regular rhythm  Heart sounds: No murmur heard  Pulmonary:      Effort: Pulmonary effort is normal       Breath sounds: Normal breath sounds  Lymphadenopathy:      Cervical: Cervical adenopathy present  Skin:     General: Skin is warm and dry  Neurological:      General: No focal deficit present  Mental Status: She is alert  Coordination: Coordination is intact     Psychiatric:         Mood and Affect: Mood normal          Behavior: Behavior normal  Behavior is cooperative  Thought Content: Thought content normal          Judgment: Judgment normal              BMI Counseling: Body mass index is 25 15 kg/m²  The BMI is above normal  Nutrition recommendations include reducing portion sizes, decreasing overall calorie intake, 3-5 servings of fruits/vegetables daily, reducing fast food intake, consuming healthier snacks, decreasing soda and/or juice intake, moderation in carbohydrate intake, increasing intake of lean protein, reducing intake of saturated fat and trans fat and reducing intake of cholesterol

## 2022-04-22 NOTE — ASSESSMENT & PLAN NOTE
Most likely early sinusitis as has colored mucus and negative covid  Tried and failed all OTC cold meds  Ceftin 250 mg as directed  Avoid decongestants as this is elevating patient's diastolic BP

## 2022-04-22 NOTE — ASSESSMENT & PLAN NOTE
Patient with a fasting sugar of 125 with an A1c fingerstick today of 4 8  This is very unusual for the patient although she did gain 40 lb after losing her job and is working on getting this back down

## 2022-04-22 NOTE — PATIENT INSTRUCTIONS
Problem List Items Addressed This Visit        Digestive    GERD (gastroesophageal reflux disease)    Irritable bowel syndrome with diarrhea       Respiratory    Allergic rhinitis    Asthma    Acute URI     Most likely early sinusitis as has colored mucus and negative covid  Tried and failed all OTC cold meds  Ceftin 250 mg as directed            Relevant Medications    cefuroxime (CEFTIN) 250 mg tablet       Other    Anxiety disorder - Primary    Heart palpitations    Depression    Hyperglycemia    Relevant Orders    POCT hemoglobin A1c (Completed)    Hemoglobin A1C    Glucose, fasting
normal...

## 2022-05-03 ENCOUNTER — CLINICAL SUPPORT (OUTPATIENT)
Dept: FAMILY MEDICINE CLINIC | Facility: CLINIC | Age: 37
End: 2022-05-03
Payer: COMMERCIAL

## 2022-05-03 DIAGNOSIS — Z11.1 SCREENING FOR TUBERCULOSIS: Primary | ICD-10-CM

## 2022-05-03 PROCEDURE — 86580 TB INTRADERMAL TEST: CPT

## 2022-06-21 ENCOUNTER — TELEMEDICINE (OUTPATIENT)
Dept: BEHAVIORAL/MENTAL HEALTH CLINIC | Facility: CLINIC | Age: 37
End: 2022-06-21
Payer: COMMERCIAL

## 2022-06-21 DIAGNOSIS — F41.1 GENERALIZED ANXIETY DISORDER: Primary | ICD-10-CM

## 2022-06-21 DIAGNOSIS — F32.9 MAJOR DEPRESSIVE DISORDER WITH CURRENT ACTIVE EPISODE, UNSPECIFIED DEPRESSION EPISODE SEVERITY, UNSPECIFIED WHETHER RECURRENT: ICD-10-CM

## 2022-06-21 PROCEDURE — 90834 PSYTX W PT 45 MINUTES: CPT | Performed by: SOCIAL WORKER

## 2022-06-21 NOTE — PSYCH
Virtual Regular Visit    Verification of patient location:    Patient is located in the following state in which I hold an active license [place]      Assessment/Plan:    Problem List Items Addressed This Visit        Other    Anxiety disorder - Primary    Depression          Goals addressed in session: Goal 1          Reason for visit is   Chief Complaint   Patient presents with    Virtual Regular Visit        Encounter provider Jamin Whaley    Provider located at [address] [place] [id number]  [phone]      Recent Visits  No visits were found meeting these conditions  Showing recent visits within past 7 days and meeting all other requirements  Future Appointments  No visits were found meeting these conditions  Showing future appointments within next 150 days and meeting all other requirements       The patient was identified by name and date of birth  Lisha Pandey was informed that this is a telemedicine visit and that the visit is being conducted through [facility] and patient was informed this is a secure, HIPAA-complaint platform  She agrees to proceed     My office door was closed  No one else was in the room  She acknowledged consent and understanding of privacy and security of the video platform  The patient has agreed to participate and understands they can discontinue the visit at any time  Patient is aware this is a billable service  Subjective  Lisha Pandey is a 42 y o  female [employer] stated that she has been working through various stressors in her life  She stated that a positive development is that her  has a new job that he is very happy with that also affords them excellent health benefits  She stated that she is currently working two part time positions for the summer   She stated that when she returns to school in the fall classes will be in person and she has to commute almost an hour away  She discussed her concern with the in person learning related to her anxiety  Discussing exploring accommodations through the college  She also discussed issues with caloric intake and her eating disorder symptoms increasing  Discussing strategies for her to combat this and also discussing the triggers  She also discussed her ongoing sadness regarding her father's loss and her inability to be near his remains due to her mother selling the family home  Discussing ways to navigate this and ides for a memorial  Giving positive feedback and supportive therapy  A- Progress- Continuing to work towards completing her treatment goals  P-Continue treatment   HPI     Past Medical History:   Diagnosis Date    Anxiety     Depression     Eating disorder        Past Surgical History:   Procedure Laterality Date    WISDOM TOOTH EXTRACTION         Current Outpatient Medications   Medication Sig Dispense Refill    albuterol (PROAIR HFA) 90 mcg/act inhaler Inhale      azelastine (ASTELIN) 0 1 % nasal spray 2 sprays into each nostril as needed        cetirizine (ZyrTEC) 10 mg tablet Take 1 tablet (10 mg total) by mouth daily (Patient taking differently: Take 10 mg by mouth as needed  ) 90 tablet 3    escitalopram (LEXAPRO) 10 mg tablet Take 1 tablet (10 mg total) by mouth daily 90 tablet 3    hyoscyamine (LEVSIN/SL) 0 125 mg SL tablet Take 1 tablet (0 125 mg total) by mouth every 6 (six) hours as needed for cramping or diarrhea 60 tablet 5    metoprolol succinate (TOPROL-XL) 25 mg 24 hr tablet Take 1 tablet (25 mg total) by mouth daily 90 tablet 3    multivitamin (THERAGRAN) TABS Take 1 tablet by mouth      omeprazole (PriLOSEC) 40 MG capsule Take 1 capsule (40 mg total) by mouth daily (Patient taking differently: Take 20 mg by mouth daily ) 90 capsule 1     No current facility-administered medications for this visit          Allergies   Allergen Reactions    Bee Venom     Codeine      Other reaction(s): Nausea and/or vomiting    Oxycodone-Acetaminophen Vomiting       Review of Systems    Video Exam    There were no vitals filed for this visit  Physical Exam     I spent 45 minutes directly with the patient during this visit    VIRTUAL VISIT [address] verbally agrees to participate in [employer]  Pt is aware that [employer] could be limited without vital signs or the ability to perform a full hands-on physical Equilla Able understands she or the provider may request at any time to terminate the video visit and request the patient to seek care or treatment in person

## 2022-07-12 ENCOUNTER — TELEMEDICINE (OUTPATIENT)
Dept: BEHAVIORAL/MENTAL HEALTH CLINIC | Facility: CLINIC | Age: 37
End: 2022-07-12
Payer: COMMERCIAL

## 2022-07-12 DIAGNOSIS — F41.1 GENERALIZED ANXIETY DISORDER: Primary | ICD-10-CM

## 2022-07-12 DIAGNOSIS — F32.9 MAJOR DEPRESSIVE DISORDER WITH CURRENT ACTIVE EPISODE, UNSPECIFIED DEPRESSION EPISODE SEVERITY, UNSPECIFIED WHETHER RECURRENT: ICD-10-CM

## 2022-07-12 PROCEDURE — 90834 PSYTX W PT 45 MINUTES: CPT | Performed by: SOCIAL WORKER

## 2022-07-12 NOTE — PSYCH
Virtual Regular Visit    Verification of patient location:    Patient is located in the following state in which I hold an active license [place]      Assessment/Plan:    Problem List Items Addressed This Visit        Other    Anxiety disorder - Primary    Depression          Goals addressed in session: Goal 1          Reason for visit is   Chief Complaint   Patient presents with    Virtual Regular Visit        Encounter provider Ruchi Medina    Provider located at [facility] [place] [address] [address]  [phone]      Recent Visits  No visits were found meeting these conditions  Showing recent visits within past 7 days and meeting all other requirements  Today's Visits  Date Type Provider Dept   07/12/22 [facility] today's visits and meeting all other requirements  Future Appointments  No visits were found meeting these conditions  Showing future appointments within next 150 days and meeting all other requirements       The patient was identified by name and date of birth  Javier Beck was informed that this is a telemedicine visit and that the visit is being conducted through [facility] and patient was informed this is a secure, HIPAA-complaint platform  She agrees to proceed     My office door was closed  No one else was in the room  She acknowledged consent and understanding of privacy and security of the video platform  The patient has agreed to participate and understands they can discontinue the visit at any time  Patient is aware this is a billable service  Subjective  Javier Beck is a 42 y o  female [facility] stated that she is preparing to attend her sister's bachelorette party this weekend   She stated that she feels that sometimes her sister does not understand her financial circumstances and becomes frustrated  She discussed that she and her  continue to work on their communication and that she has continued to work two jobs during her summer break from school  She discussed the upcoming semester and her challenge of having to commute over an hour away several times per week  Discussing ways for her to continue to manage her stress as well as continued to pay attention to her own needs  Giving supportive therapy  A- Progress- Continuing to work towards completing her treatment goals  P_Continue treatment   HPI     Past Medical History:   Diagnosis Date    Anxiety     Depression     Eating disorder        Past Surgical History:   Procedure Laterality Date    WISDOM TOOTH EXTRACTION         Current Outpatient Medications   Medication Sig Dispense Refill    albuterol (PROAIR HFA) 90 mcg/act inhaler Inhale      azelastine (ASTELIN) 0 1 % nasal spray 2 sprays into each nostril as needed        cetirizine (ZyrTEC) 10 mg tablet Take 1 tablet (10 mg total) by mouth daily (Patient taking differently: Take 10 mg by mouth as needed  ) 90 tablet 3    escitalopram (LEXAPRO) 10 mg tablet Take 1 tablet (10 mg total) by mouth daily 90 tablet 3    hyoscyamine (LEVSIN/SL) 0 125 mg SL tablet Take 1 tablet (0 125 mg total) by mouth every 6 (six) hours as needed for cramping or diarrhea 60 tablet 5    metoprolol succinate (TOPROL-XL) 25 mg 24 hr tablet Take 1 tablet (25 mg total) by mouth daily 90 tablet 3    multivitamin (THERAGRAN) TABS Take 1 tablet by mouth      omeprazole (PriLOSEC) 40 MG capsule Take 1 capsule (40 mg total) by mouth daily (Patient taking differently: Take 20 mg by mouth daily ) 90 capsule 1     No current facility-administered medications for this visit          Allergies   Allergen Reactions    Bee Venom     Codeine      Other reaction(s): Nausea and/or vomiting    Oxycodone-Acetaminophen Vomiting       Review of Systems    Video Exam    There were no vitals filed for this visit  Physical Exam     I spent 40 minutes directly with the patient during this visit    VIRTUAL VISIT [address] verbally agrees to participate in [employer]  Pt is aware that [employer] could be limited without vital signs or the ability to perform a full hands-on physical Alexey Marquis understands she or the provider may request at any time to terminate the video visit and request the patient to seek care or treatment in person

## 2022-08-08 ENCOUNTER — TELEMEDICINE (OUTPATIENT)
Dept: BEHAVIORAL/MENTAL HEALTH CLINIC | Facility: CLINIC | Age: 37
End: 2022-08-08
Payer: COMMERCIAL

## 2022-08-08 DIAGNOSIS — F32.9 MAJOR DEPRESSIVE DISORDER WITH CURRENT ACTIVE EPISODE, UNSPECIFIED DEPRESSION EPISODE SEVERITY, UNSPECIFIED WHETHER RECURRENT: ICD-10-CM

## 2022-08-08 DIAGNOSIS — F41.1 GENERALIZED ANXIETY DISORDER: Primary | ICD-10-CM

## 2022-08-08 PROCEDURE — 90834 PSYTX W PT 45 MINUTES: CPT | Performed by: SOCIAL WORKER
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
## 2022-09-01 ENCOUNTER — TELEPHONE (OUTPATIENT)
Dept: BEHAVIORAL/MENTAL HEALTH CLINIC | Facility: CLINIC | Age: 37
End: 2022-09-01

## 2022-10-11 PROBLEM — J06.9 ACUTE URI: Status: RESOLVED | Noted: 2022-04-22 | Resolved: 2022-10-11

## 2022-10-12 ENCOUNTER — TELEMEDICINE (OUTPATIENT)
Dept: BEHAVIORAL/MENTAL HEALTH CLINIC | Facility: CLINIC | Age: 37
End: 2022-10-12
Payer: COMMERCIAL

## 2022-10-12 DIAGNOSIS — F41.1 GENERALIZED ANXIETY DISORDER: Primary | ICD-10-CM

## 2022-10-12 DIAGNOSIS — F32.9 MAJOR DEPRESSIVE DISORDER WITH CURRENT ACTIVE EPISODE, UNSPECIFIED DEPRESSION EPISODE SEVERITY, UNSPECIFIED WHETHER RECURRENT: ICD-10-CM

## 2022-10-12 PROCEDURE — 90834 PSYTX W PT 45 MINUTES: CPT | Performed by: SOCIAL WORKER

## 2022-10-12 NOTE — PSYCH
Virtual Regular Visit    Verification of patient location:    Patient is located in the following state in which I hold an active license [place]      Assessment/Plan:    Problem List Items Addressed This Visit        Other    Anxiety disorder - Primary    Depression          Goals addressed in session: Goal 1          Reason for visit is   Chief Complaint   Patient presents with   • Virtual Regular Visit        Encounter provider Efren Gannon    Provider located at [address] [place] [id number] [phone]      Recent Visits  No visits were found meeting these conditions  Showing recent visits within past 7 days and meeting all other requirements  Today's Visits  Date Type Provider Dept   10/12/22 1106 [facility] [place] [place] today's visits and meeting all other requirements  Future Appointments  No visits were found meeting these conditions  Showing future appointments within next 150 days and meeting all other requirements       The patient was identified by name and date of birth  Marco Antonio Syed was informed that this is a telemedicine visit and that the visit is being conducted through [facility] and patient was informed this is a secure, HIPAA-complaint platform  She agrees to proceed     My office door was closed  No one else was in the room  She acknowledged consent and understanding of privacy and security of the video platform  The patient has agreed to participate and understands they can discontinue the visit at any time  Patient is aware this is a billable service  Subjective  Marco Antonio Syed is a 42 y o  female ADDY Hirsch stated that she has been doing well overall  She stated that she has continued to do well in her classes and will be starting her OB rotation   She stated that her sister's wedding occurred at the end of September and that it went smoothly and she had an enjoyable time  She discussed issues regarding her relationship with he mother and sister  She stated that her mother made a statement to her at the wedding inferring that she should watch her alcohol intake  She stated that a good friend was standing with her when this occurred and was able to validate the comment was inaccurate and inappropriate  She stated that the day after the wedding, her sister discussed her embarrassment and frustration at her for referring to their mother by her first name  She stated that her friend was also presented for that conversation  Processing her emotions and discussing her relationship with her mother and how it has affected her life  She discussed how her sister's relationship with their mother is very different based on her mother's protection of her sister as her father had not wanted a second child  Discussing ways for her to set and maintain boundaries with her mother and sister  reviewing and renewing her treatment goals  Giving supportive therapy  A- Progress- Continuing to work towards completing her treatment goals  P-Continue treatment         HPI     Past Medical History:   Diagnosis Date   • Anxiety    • Depression    • Eating disorder        Past Surgical History:   Procedure Laterality Date   • WISDOM TOOTH EXTRACTION         Current Outpatient Medications   Medication Sig Dispense Refill   • albuterol (PROAIR HFA) 90 mcg/act inhaler Inhale     • azelastine (ASTELIN) 0 1 % nasal spray 2 sprays into each nostril as needed       • cetirizine (ZyrTEC) 10 mg tablet Take 1 tablet (10 mg total) by mouth daily (Patient taking differently: Take 10 mg by mouth as needed  ) 90 tablet 3   • escitalopram (LEXAPRO) 10 mg tablet Take 1 tablet (10 mg total) by mouth daily 90 tablet 3   • hyoscyamine (LEVSIN/SL) 0 125 mg SL tablet Take 1 tablet (0 125 mg total) by mouth every 6 (six) hours as needed for cramping or diarrhea 60 tablet 5   • metoprolol succinate (TOPROL-XL) 25 mg 24 hr tablet Take 1 tablet (25 mg total) by mouth daily 90 tablet 3   • multivitamin (THERAGRAN) TABS Take 1 tablet by mouth     • omeprazole (PriLOSEC) 40 MG capsule Take 1 capsule (40 mg total) by mouth daily (Patient taking differently: Take 20 mg by mouth daily ) 90 capsule 1     No current facility-administered medications for this visit  Allergies   Allergen Reactions   • Bee Venom    • Codeine      Other reaction(s): Nausea and/or vomiting   • Oxycodone-Acetaminophen Vomiting       Review of Systems    Video Exam    There were no vitals filed for this visit      Physical Exam     I spent 43 minutes directly with the patient during this visit   Time In: 10:06am  Time Out: 10:49am

## 2022-10-12 NOTE — BH TREATMENT PLAN
Carmen Shi  1985       Date of Initial Treatment Plan: 4/2/21   Date of Current Treatment Plan: 10/12/22    Treatment Plan Number 4     Strengths/Personal Resources for Self Care: Creative, organized, loyal, caring, intelligent    Diagnosis:   1  Generalized anxiety disorder     2  Major depressive disorder with current active episode, unspecified depression episode severity, unspecified whether recurrent         Area of Needs:   Grief/loss  Stress     Long Term Goal 1: Be able to cope with the losses        Target Date: 4/12/23  Completion Date: TBD         Short Term Objectives for Goal 1:       Be able to talk about it      Do not minimize what I am feeling      Use my support system      Set boundaries with mom        Long Term Goal 2: Be able to manage my stress           Target Date: 4/12/23  Completion Date: TBD     Short Term Objectives for Goal 2:   Set boundaries with others  Be able to identify my triggers  Use healthy coping mechanisms for stress                 GOAL 1: Modality: Individual 1-2x per month   Completion Date TBD and The person(s) responsible for carrying out the plan is [place]     GOAL 2: Modality: Individual 1-2x per month   Completion Date TBD and The person(s) responsible for carrying out the plan is [place]       Behavioral Health Treatment Plan [facility]: Diagnosis and Treatment Plan explained to Lily Juan relates understanding diagnosis and is agreeable to Treatment Plan         Client Comments : Please share your thoughts, feelings, need and/or experiences regarding your treatment plan: None

## 2022-10-28 ENCOUNTER — OFFICE VISIT (OUTPATIENT)
Dept: FAMILY MEDICINE CLINIC | Facility: CLINIC | Age: 37
End: 2022-10-28

## 2022-10-28 VITALS
SYSTOLIC BLOOD PRESSURE: 112 MMHG | WEIGHT: 159 LBS | DIASTOLIC BLOOD PRESSURE: 70 MMHG | RESPIRATION RATE: 16 BRPM | HEIGHT: 68 IN | BODY MASS INDEX: 24.1 KG/M2 | HEART RATE: 80 BPM

## 2022-10-28 DIAGNOSIS — J45.20 MILD INTERMITTENT ASTHMA WITHOUT COMPLICATION: ICD-10-CM

## 2022-10-28 DIAGNOSIS — F41.1 GENERALIZED ANXIETY DISORDER: ICD-10-CM

## 2022-10-28 DIAGNOSIS — Z11.4 SCREENING FOR HIV (HUMAN IMMUNODEFICIENCY VIRUS): ICD-10-CM

## 2022-10-28 DIAGNOSIS — K21.9 GASTROESOPHAGEAL REFLUX DISEASE WITHOUT ESOPHAGITIS: ICD-10-CM

## 2022-10-28 DIAGNOSIS — Z23 ENCOUNTER FOR IMMUNIZATION: Primary | ICD-10-CM

## 2022-10-28 DIAGNOSIS — R73.9 HYPERGLYCEMIA: ICD-10-CM

## 2022-10-28 DIAGNOSIS — Z71.89 EDUCATED ABOUT COVID-19 VIRUS INFECTION: ICD-10-CM

## 2022-10-28 DIAGNOSIS — Z11.59 NEED FOR HEPATITIS C SCREENING TEST: ICD-10-CM

## 2022-10-28 DIAGNOSIS — R03.0 ELEVATED BLOOD PRESSURE READING: ICD-10-CM

## 2022-10-28 DIAGNOSIS — R00.2 HEART PALPITATIONS: ICD-10-CM

## 2022-10-28 DIAGNOSIS — K58.0 IRRITABLE BOWEL SYNDROME WITH DIARRHEA: ICD-10-CM

## 2022-10-28 RX ORDER — FAMOTIDINE 20 MG/1
20 TABLET, FILM COATED ORAL DAILY
Qty: 90 TABLET | Refills: 3 | Status: SHIPPED | OUTPATIENT
Start: 2022-10-28

## 2022-10-28 RX ORDER — OMEPRAZOLE 20 MG/1
20 CAPSULE, DELAYED RELEASE ORAL DAILY
Qty: 90 CAPSULE | Refills: 3 | Status: SHIPPED | OUTPATIENT
Start: 2022-10-28

## 2022-10-28 NOTE — ASSESSMENT & PLAN NOTE
Patient does have some anxiety, but it resolves round increased stress with school work  Continue on Lexapro  No change at the moment  Negative SI, positive contract

## 2022-10-28 NOTE — PROGRESS NOTES
Name: Bartolome Medina      : 1985      MRN: [id number]  Encounter Provider: Pramod Rojas MD  Encounter Date: 10/28/2022   Encounter department: [facility] PRIMARY [facility]ARE    Assessment & Plan     1  Encounter for immunization  -     influenza vaccine, quadrivalent, 0 5 mL, preservative-free, for adult and pediatric patients 6 mos+ (AFLURIA, FLUARIX, FLULAVAL, FLUZONE)    2  Mild intermittent asthma without complication  Assessment & Plan:  Doing very well with asthma  Would recommend flu vaccine, COVID vaccine  3  Gastroesophageal reflux disease without esophagitis  Assessment & Plan:  Patient is currently using omeprazole, 20 mg a day  Will send prescription to the pharmacy  Does seem to work quite well for her  Could consider using Pepcid  Will send 20 mg Pepcid  She can take 1 tablet daily  If this does not work, she could increase to 40 mg  If that does not work, go back to omeprazole  Orders:  -     omeprazole (PriLOSEC) 20 mg delayed release capsule; Take 1 capsule (20 mg total) by mouth daily  -     famotidine (PEPCID) 20 mg tablet; Take 1 tablet (20 mg total) by mouth daily    4  Irritable bowel syndrome with diarrhea  Assessment & Plan:  Stable  No particular concerns  Doing well  5  Hyperglycemia  Assessment & Plan: At some point, would recommend check labs  6  Generalized anxiety disorder  Assessment & Plan:  Patient does have some anxiety, but it resolves round increased stress with school work  Continue on Lexapro  No change at the moment  Negative SI, positive contract  7  Elevated blood pressure reading  Assessment & Plan:  Blood pressure elevation noted previously  Today's blood pressure was fantastic  8  Heart palpitations  Assessment & Plan:  Continues with metoprolol for palpitations  Doing quite well  5  Educated about COVID-19 virus infection  Comments:  Recommend COVID bivalent booster      10  Need for hepatitis C screening test  -     Hepatitis C Antibody ([facility]); Future    11  Screening for HIV (human immunodeficiency virus)  -     HIV 1/2 Antigen/Antibody (4th Generation) w Reflex [facility]; Future           Subjective     Chief Complaint   Patient presents with   • Follow-up     Pt here for a follow up- Rcelijahz          Patient is here to follow-up on multiple issues  She mentioned that last week she had some increasing abdominal discomfort  She increase the omeprazole based on that  She did change diet, as well as limit alcohol  With that, she had improvement in her symptoms  About 3 weeks ago, she also had some IBS flare symptoms  Seems to be better at this point  She did take 1 Levsin  Hyperglycemia:  Noted previously  Again, her blood sugar was minimally elevated but her A1c was fantastic  No recent labs  Elevated blood pressure reading: This was noted much before  Reviewed blood pressure today  Asthma:  Again, no problems  Does have albuterol available has not needed to use it  Patient previously had COVID  Did have 2 vaccinations, primary series  Anxiety and depression: Seeing counseling  Still on Lexapro  Review of Systems   Constitutional: Negative  HENT: Negative  All other systems reviewed and are negative        Current Outpatient Medications on File Prior to Visit   Medication Sig   • albuterol (PROVENTIL HFA,VENTOLIN HFA) 90 mcg/act inhaler Inhale   • azelastine (ASTELIN) 0 1 % nasal spray 2 sprays into each nostril as needed     • cetirizine (ZyrTEC) 10 mg tablet Take 1 tablet (10 mg total) by mouth daily (Patient taking differently: Take 10 mg by mouth as needed)   • escitalopram (LEXAPRO) 10 mg tablet Take 1 tablet (10 mg total) by mouth daily   • hyoscyamine (LEVSIN/SL) 0 125 mg SL tablet Take 1 tablet (0 125 mg total) by mouth every 6 (six) hours as needed for cramping or diarrhea   • metoprolol succinate (TOPROL-XL) 25 mg 24 hr tablet Take 1 tablet (25 mg total) by mouth daily   • multivitamin (THERAGRAN) TABS Take 1 tablet by mouth   • [DISCONTINUED] omeprazole (PriLOSEC) 40 MG capsule Take 1 capsule (40 mg total) by mouth daily (Patient taking differently: Take 20 mg by mouth daily)       Objective         /70 (BP Location: Left arm, Patient Position: Sitting, Cuff Size: Large)   Pulse 80   Resp 16   Ht 5' 8" (1 727 m)   Wt 72 1 kg (159 lb)   BMI 24 18 kg/m²     Physical Exam  Vitals and nursing note reviewed  Constitutional:       Appearance: She is well-developed  HENT:      Head: Normocephalic and atraumatic  Cardiovascular:      Rate and Rhythm: Normal rate and regular rhythm  Pulses:           Carotid pulses are 2+ on the right side and 2+ on the left side  Heart sounds: Normal heart sounds  Pulmonary:      Effort: Pulmonary effort is normal       Breath sounds: Normal breath sounds  No wheezing or rales  Chest:      Chest wall: No tenderness         Leny Gilmore MD

## 2022-10-28 NOTE — ASSESSMENT & PLAN NOTE
Patient is currently using omeprazole, 20 mg a day  Will send prescription to the pharmacy  Does seem to work quite well for her  Could consider using Pepcid  Will send 20 mg Pepcid  She can take 1 tablet daily  If this does not work, she could increase to 40 mg  If that does not work, go back to omeprazole

## 2022-10-28 NOTE — PATIENT INSTRUCTIONS
1  Encounter for immunization  -     influenza vaccine, quadrivalent, 0 5 mL, preservative-free, for adult and pediatric patients 6 mos+ (AFLURIA, FLUARIX, FLULAVAL, FLUZONE)    2  Mild intermittent asthma without complication  Assessment & Plan:  Doing very well with asthma  Would recommend flu vaccine, COVID vaccine  3  Gastroesophageal reflux disease without esophagitis  Assessment & Plan:  Patient is currently using omeprazole, 20 mg a day  Will send prescription to the pharmacy  Does seem to work quite well for her  Could consider using Pepcid  Will send 20 mg Pepcid  She can take 1 tablet daily  If this does not work, she could increase to 40 mg  If that does not work, go back to omeprazole  Orders:  -     omeprazole (PriLOSEC) 20 mg delayed release capsule; Take 1 capsule (20 mg total) by mouth daily  -     famotidine (PEPCID) 20 mg tablet; Take 1 tablet (20 mg total) by mouth daily    4  Irritable bowel syndrome with diarrhea  Assessment & Plan:  Stable  No particular concerns  Doing well  5  Hyperglycemia  Assessment & Plan: At some point, would recommend check labs  6  Generalized anxiety disorder  Assessment & Plan:  Patient does have some anxiety, but it resolves round increased stress with school work  Continue on Lexapro  No change at the moment  Negative SI, positive contract  7  Elevated blood pressure reading  Assessment & Plan:  Blood pressure elevation noted previously  Today's blood pressure was fantastic  8  Heart palpitations  Assessment & Plan:  Continues with metoprolol for palpitations  Doing quite well  5  Educated about COVID-19 virus infection  Comments:  Recommend COVID bivalent booster  10  Need for hepatitis C screening test  -     Hepatitis C Antibody ([facility]); Future    11   Screening for HIV (human immunodeficiency virus)  -     HIV 1/2 Antigen/Antibody (4th Generation) w Reflex [facility]; Future      COVID 19 Instructions    Sania Wonsocrates was advised to limit contact with others to essential tasks such as getting food, medications, and medical care  Proper handwashing reviewed, and Hand sanitzer when washing is not available  If the patient develops symptoms of COVID 19, the patient should call the office as soon as possible  For 4507-7170 Flu season, it is strongly recommended that Flu Vaccinations be obtained  Virtual Visits:  [place]: This works on smart phones (any phone with Internet browsing capability)  You should get a text message when the provider is ready to see you  Click on the link in the text message, and the call should start  You will need to type in your name, and allow camera and microphone access  This is HIPPA compliant, and secure  If you have not already done so, get immunized to COVID 19  We are committed to getting you vaccinated as soon as possible and will be closely following CDC and [facility]  guidelines as they are released and revised  Please refer to our COVID-19 vaccine webpage for the most up to date information on the vaccine and our distribution efforts  This site will also have the most up to date recommendations for COVID booster vaccine  Marcello mckeon    Call [phone] ([phone]), option 7    OUR NEW LOCATION:    [address], [address] [place], [place] [address] , [place], [address]  Fax: [phone]    Lab services and OB/GYN are at this location as well  never used

## 2022-11-13 ENCOUNTER — AMB VIDEO VISIT (OUTPATIENT)
Dept: OTHER | Facility: HOSPITAL | Age: 37
End: 2022-11-13

## 2022-11-13 DIAGNOSIS — B96.89 ACUTE BACTERIAL PHARYNGITIS: Primary | ICD-10-CM

## 2022-11-13 DIAGNOSIS — J02.8 ACUTE BACTERIAL PHARYNGITIS: Primary | ICD-10-CM

## 2022-11-13 RX ORDER — AMOXICILLIN 500 MG/1
500 TABLET, FILM COATED ORAL 2 TIMES DAILY
Qty: 20 TABLET | Refills: 0 | Status: SHIPPED | OUTPATIENT
Start: 2022-11-13 | End: 2022-11-23

## 2022-11-13 NOTE — CARE ANYWHERE EVISITS
Visit Summary for Stevie Bustillo - Gender: Female - Date of Birth: [id number]  Date: [id number] - Duration: 5 minutes  Patient: Stevie Bustillo  Provider: Ramon Cardoza PA-C    Patient Contact Information  Address  [address] [place] [address]; [address]   [phone]    Visit Topics    Triage Questions   What is your current physical address in the event of a medical emergency? Answer []  Are you allergic to any medications? Answer []  Are you now or could you be pregnant? Answer []  Do you have any immune system compromise or chronic lung   disease? Answer []  Do you have any vulnerable family members in the home (infant, pregnant, cancer, elderly)? Answer []     Conversation Transcripts  [0A][0A] [Notification] You are connected with Ramon Cardoza PA-C, Urgent Care Specialist [0A][Notification] Stevie Bustillo is located in [place]  [0A][Notification] Stevie Bustillo has shared health history  Iris Costello  [0A]    Diagnosis  Acute pharyngitis due to other specified organisms    Procedures  Value: [id number] Code: CPT-4 UNLISTED E&M SERVICE    Medications Prescribed    No prescriptions ordered    Electronically signed by: Belkis Mead(NPI [id number])

## 2022-11-13 NOTE — PATIENT INSTRUCTIONS
Strep Throat   WHAT YOU NEED TO KNOW:   Strep throat is a throat infection caused by bacteria  It is easily spread from person to person  DISCHARGE INSTRUCTIONS:   Call 911 for any of the following: You have trouble breathing  Return to the emergency department if:   You have new symptoms like a bad headache, stiff neck, chest pain, or vomiting  You are drooling because you cannot swallow your spit  Contact your healthcare provider if:   You have a fever  You have a rash or ear pain  You have green, yellow-brown, or bloody mucus when you cough or blow your nose  You are unable to drink anything  You have questions or concerns about your condition or care  Medicines:   Antibiotics  help treat your strep throat  You should feel better within 2 to 3 days after you start antibiotics  Take your medicine as directed  Contact your healthcare provider if you think your medicine is not helping or if you have side effects  Tell him or her if you are allergic to any medicine  Keep a list of the medicines, vitamins, and herbs you take  Include the amounts, and when and why you take them  Bring the list or the pill bottles to follow-up visits  Carry your medicine list with you in case of an emergency  Manage your symptoms:   Use lozenges, ice, soft foods, or popsicles  to soothe your throat  Drink juice, milk shakes, or soup  if your throat is too sore to eat solid food  Drinking liquids can also help prevent dehydration  Gargle with salt water  Mix ¼ teaspoon salt in a glass of warm water and gargle  This may help reduce swelling in your throat  Do not smoke  Nicotine and other chemicals in cigarettes and cigars can cause lung damage and make your symptoms worse  Ask your healthcare provider for information if you currently smoke and need help to quit  E-cigarettes or smokeless tobacco still contain nicotine  Talk to your healthcare provider before you use these products      Return to work or school  24 hours after you start antibiotic medicine  Prevent the spread of strep throat:   Wash your hands often  Use soap and water  Wash your hands after you use the bathroom, change a child's diapers, or sneeze  Wash your hands before you prepare or eat food  Do not share food or drinks  Replace your toothbrush after you have taken antibiotics for 24 hours  Follow up with your doctor as directed:  Write down your questions so you remember to ask them during your visits  © Copyright [employer] 2022 Information is for End User's use only and may not be sold, redistributed or otherwise used for commercial purposes  All illustrations and images included in CareNotes® are the copyrighted property of A D A M , Inc  or [facility] Deandra Cheung   The above information is an  only  It is not intended as medical advice for individual conditions or treatments  Talk to your doctor, nurse or pharmacist before following any medical regimen to see if it is safe and effective for you

## 2022-11-13 NOTE — PROGRESS NOTES
Video Visit - Reid Quiñones 42 y o  female MRN: [id number]    REQUIRED DOCUMENTATION:         1  This service was provided via [employer]  2  Provider located at [address] [place] [id number] [phone]  3  [facility] provider: Jeronimo Celaya PA-C   4  Identify all parties in room with patient during [facility] visit:  No one else  5  After connecting through [employer], patient was identified by name and date of birth  Patient was then informed that this was a Telemedicine visit and that the exam was being conducted confidentially over secure lines  My office door was closed  No one else was in the room  Patient acknowledged consent and understanding of privacy and security of the Telemedicine visit  I informed the patient that I have reviewed their record in Epic and presented the opportunity for them to ask any questions regarding the visit today  The patient agreed to participate  HPI  Patient states she has had a severe sore throat on the left side and its starting to get really rare and patchy  She denies any fevers, chills, nausea, vomiting  Ibuprofen and cough drops aren't helping  Physical Exam  Constitutional:       General: She is not in acute distress  Appearance: Normal appearance  She is not ill-appearing, toxic-appearing or diaphoretic  HENT:      Head: Normocephalic and atraumatic  Mouth/Throat:      Pharynx: Posterior oropharyngeal erythema present  Pulmonary:      Effort: Pulmonary effort is normal  No respiratory distress  Comments: Talking in complete sentences, no audible wheezing  Skin:     General: Skin is dry  Neurological:      General: No focal deficit present  Mental Status: She is alert and oriented to person, place, and time     Psychiatric:         Mood and Affect: Mood normal          Behavior: Behavior normal          Diagnoses and all orders for this visit:    Acute bacterial pharyngitis  - amoxicillin (AMOXIL) 500 MG tablet; Take 1 tablet (500 mg total) by mouth 2 (two) times a day for 10 days    will treat clinically for strep  Change toothbrush in 24 hrs   Follow up with PCP  ER if worsen  Patient Instructions   Strep Throat   WHAT YOU NEED TO KNOW:   Strep throat is a throat infection caused by bacteria  It is easily spread from person to person  DISCHARGE INSTRUCTIONS:   Call 911 for any of the following:   · You have trouble breathing  Return to the emergency department if:   · You have new symptoms like a bad headache, stiff neck, chest pain, or vomiting  · You are drooling because you cannot swallow your spit  Contact your healthcare provider if:   · You have a fever  · You have a rash or ear pain  · You have green, yellow-brown, or bloody mucus when you cough or blow your nose  · You are unable to drink anything  · You have questions or concerns about your condition or care  Medicines:   · Antibiotics  help treat your strep throat  You should feel better within 2 to 3 days after you start antibiotics  · Take your medicine as directed  Contact your healthcare provider if you think your medicine is not helping or if you have side effects  Tell him or her if you are allergic to any medicine  Keep a list of the medicines, vitamins, and herbs you take  Include the amounts, and when and why you take them  Bring the list or the pill bottles to follow-up visits  Carry your medicine list with you in case of an emergency  Manage your symptoms:   · Use lozenges, ice, soft foods, or popsicles  to soothe your throat  · Drink juice, milk shakes, or soup  if your throat is too sore to eat solid food  Drinking liquids can also help prevent dehydration  · Gargle with salt water  Mix ¼ teaspoon salt in a glass of warm water and gargle  This may help reduce swelling in your throat  · Do not smoke    Nicotine and other chemicals in cigarettes and cigars can cause lung damage and make your symptoms worse  Ask your healthcare provider for information if you currently smoke and need help to quit  E-cigarettes or smokeless tobacco still contain nicotine  Talk to your healthcare provider before you use these products  Return to work or school  24 hours after you start antibiotic medicine  Prevent the spread of strep throat:   · Wash your hands often  Use soap and water  Wash your hands after you use the bathroom, change a child's diapers, or sneeze  Wash your hands before you prepare or eat food  · Do not share food or drinks  Replace your toothbrush after you have taken antibiotics for 24 hours  Follow up with your doctor as directed:  Write down your questions so you remember to ask them during your visits  © Copyright [address] 2022 Information is for End User's use only and may not be sold, redistributed or otherwise used for commercial purposes  All illustrations and images included in CareNotes® are the copyrighted property of A D A M , Inc  or [facility] Deandra Cheung   The above information is an  only  It is not intended as medical advice for individual conditions or treatments  Talk to your doctor, nurse or pharmacist before following any medical regimen to see if it is safe and effective for you

## 2022-11-30 ENCOUNTER — TELEMEDICINE (OUTPATIENT)
Dept: BEHAVIORAL/MENTAL HEALTH CLINIC | Facility: CLINIC | Age: 37
End: 2022-11-30

## 2022-11-30 DIAGNOSIS — F32.9 MAJOR DEPRESSIVE DISORDER WITH CURRENT ACTIVE EPISODE, UNSPECIFIED DEPRESSION EPISODE SEVERITY, UNSPECIFIED WHETHER RECURRENT: ICD-10-CM

## 2022-11-30 DIAGNOSIS — F41.1 GENERALIZED ANXIETY DISORDER: Primary | ICD-10-CM
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
## 2023-01-02 ENCOUNTER — AMB VIDEO VISIT (OUTPATIENT)
Dept: OTHER | Facility: HOSPITAL | Age: 38
End: 2023-01-02

## 2023-01-02 VITALS — TEMPERATURE: 98.4 F

## 2023-01-02 DIAGNOSIS — J06.9 UPPER RESPIRATORY TRACT INFECTION, UNSPECIFIED TYPE: Primary | ICD-10-CM

## 2023-01-02 RX ORDER — PREDNISONE 20 MG/1
20 TABLET ORAL 2 TIMES DAILY WITH MEALS
Qty: 6 TABLET | Refills: 0 | Status: SHIPPED | OUTPATIENT
Start: 2023-01-02 | End: 2023-01-05

## 2023-01-02 NOTE — PROGRESS NOTES
Video Visit - Janki Contreras 42 y o  female MRN: [id number]    REQUIRED DOCUMENTATION:         1  This service was provided via [employer]  2  Provider located at [address] [place] [id number] [phone]  3  [facility] provider: KAREN Pena  4  Identify all parties in [facility]:  Patient   5  After connecting through [employer], patient was identified by name and date of birth  Patient was then informed that this was a Telemedicine visit and that the exam was being conducted confidentially over secure lines  My office door was closed  No one else was in the room  Patient acknowledged consent and understanding of privacy and security of the Telemedicine visit  I informed the patient that I have reviewed their record in Epic and presented the opportunity for them to ask any questions regarding the visit today  The patient agreed to participate  This is a 42year old female here today with congestion, sneezing, cough and post nasal drip  Symptoms started 3 days ago  She developed sore throat  No fever  She states she did test for covid which was negative  She states last night she had some trouble breathing to cough  She states cough is productive  She has tried mucinex, dayquil and nyquil  She has not needed her inhaler  She has history of asthma  No wheezing  Review of Systems   Constitutional: Positive for activity change and fatigue  Negative for chills and fever  HENT: Positive for congestion, rhinorrhea and sinus pain  Respiratory: Positive for cough  Negative for shortness of breath and wheezing  Cardiovascular: Negative  Skin: Negative  Neurological: Negative  Psychiatric/Behavioral: Negative  Vitals:    01/02/23 1403   Temp: 98 4 °F (36 9 °C)     Physical Exam  Constitutional:       General: She is not in acute distress  Appearance: Normal appearance   She is not ill-appearing or toxic-appearing  HENT:      Head: Normocephalic and atraumatic  Nose: Congestion and rhinorrhea present  Eyes:      Conjunctiva/sclera: Conjunctivae normal    Pulmonary:      Effort: Pulmonary effort is normal  No respiratory distress  Comments: No cough or audible wheezing   Skin:     Comments: No rash on head or neck  Neurological:      Mental Status: She is alert and oriented to person, place, and time  Psychiatric:         Mood and Affect: Mood normal          Behavior: Behavior normal          Thought Content: Thought content normal          Judgment: Judgment normal        Diagnoses and all orders for this visit:    Upper respiratory tract infection, unspecified type  -     predniSONE 20 mg tablet; Take 1 tablet (20 mg total) by mouth 2 (two) times a day with meals for 3 days      Patient Instructions   As we discussed your illness is viral   No antibiotics are indicated at this time  Will try several days of prednisone  Rest and drink extra fluids  OTC cough and cold medications as needed  Tylenol or Motrin as needed for pain or fever  Salt water gargles and throat lozenges for sore throat  Follow up with PCP if no improvement  Go to ER with worsening symptoms  Cold Symptoms   WHAT YOU NEED TO KNOW:   A cold is an infection caused by a virus  The infection causes your upper respiratory system to become inflamed  Common symptoms of a cold include sneezing, dry throat, a stuffy nose, headache, watery eyes, and a cough  Your cough may be dry, or you may cough up mucus  You may also have muscle aches, joint pain, and tiredness  Rarely, you may have a fever  Most colds go away without treatment  DISCHARGE INSTRUCTIONS:   Return to the emergency department if:   · You have increased tiredness and weakness  · You are unable to eat  · Your heart is beating much faster than usual for you       · You see white spots in the back of your throat and your neck is swollen and sore to the touch      · You see pinpoint or larger reddish-purple dots on your skin  Contact your healthcare provider if:   · You have a fever higher than 102°F (38 9°C)  · You have new or worsening shortness of breath  · You have thick nasal drainage for more than 2 days  · Your symptoms do not improve or get worse within 5 days  · You have questions or concerns about your condition or care  Medicines: The following medicines may be suggested by your healthcare provider to decrease your cold symptoms  These medicines are available without a doctor's order  Ask which medicines to take and when to take them  Follow directions  · NSAIDs or acetaminophen  help to bring down a fever or decrease pain  · Decongestants  help decrease nasal stuffiness  · Antihistamines  help decrease sneezing and a runny nose  · Cough suppressants  help decrease how much you cough  · Expectorants  help loosen mucus so you can cough it up  · Take your medicine as directed  Contact your healthcare provider if you think your medicine is not helping or if you have side effects  Tell him of her if you are allergic to any medicine  Keep a list of the medicines, vitamins, and herbs you take  Include the amounts, and when and why you take them  Bring the list or the pill bottles to follow-up visits  Carry your medicine list with you in case of an emergency  Symptom relief: The following may help relieve cold symptoms, such as a dry throat and congestion:  · Gargle with mouthwash or warm salt water as directed  · Suck on throat lozenges or hard candy  · Use a cold or warm vaporizer or humidifier to ease your breathing  · Rest for at least 2 days and then as needed to decrease tiredness and weakness  · Use petroleum based jelly around your nostrils to decrease irritation from blowing your nose  Drink liquids:  Liquids will help thin and loosen thick mucus so you can cough it up   Liquids will also keep you hydrated  Ask your healthcare provider which liquids are best for you and how much to drink each day  Prevent the spread of germs: You can spread your cold germs to others for at least 3 days after your symptoms start  Wash your hands often  Do not share items, such as eating utensils  Cover your nose and mouth when you cough or sneeze using the crook of your elbow instead of your hands  Throw used tissues in the garbage  Do not smoke:  Smoking may worsen your symptoms and increase the length of time you feel sick  Talk with your healthcare provider if you need help to stop smoking  Follow up with your healthcare provider as directed:  Write down your questions so you remember to ask them during your visits  © 2017 [facility] Information is for End User's use only and may not be sold, redistributed or otherwise used for commercial purposes  All illustrations and images included in CareNotes® are the copyrighted property of A D A M , Inc  or Alan Hines  The above information is an  only  It is not intended as medical advice for individual conditions or treatments  Talk to your doctor, nurse or pharmacist before following any medical regimen to see if it is safe and effective for you  Follow up with PCP if not improved, if symptoms are worse, go to the ER

## 2023-01-02 NOTE — PATIENT INSTRUCTIONS
As we discussed your illness is viral   No antibiotics are indicated at this time  Will try several days of prednisone  Rest and drink extra fluids  OTC cough and cold medications as needed  Tylenol or Motrin as needed for pain or fever  Salt water gargles and throat lozenges for sore throat  Follow up with PCP if no improvement  Go to ER with worsening symptoms  Cold Symptoms   WHAT YOU NEED TO KNOW:   A cold is an infection caused by a virus  The infection causes your upper respiratory system to become inflamed  Common symptoms of a cold include sneezing, dry throat, a stuffy nose, headache, watery eyes, and a cough  Your cough may be dry, or you may cough up mucus  You may also have muscle aches, joint pain, and tiredness  Rarely, you may have a fever  Most colds go away without treatment  DISCHARGE INSTRUCTIONS:   Return to the emergency department if:   You have increased tiredness and weakness  You are unable to eat  Your heart is beating much faster than usual for you  You see white spots in the back of your throat and your neck is swollen and sore to the touch  You see pinpoint or larger reddish-purple dots on your skin  Contact your healthcare provider if:   You have a fever higher than 102°F (38 9°C)  You have new or worsening shortness of breath  You have thick nasal drainage for more than 2 days  Your symptoms do not improve or get worse within 5 days  You have questions or concerns about your condition or care  Medicines: The following medicines may be suggested by your healthcare provider to decrease your cold symptoms  These medicines are available without a doctor's order  Ask which medicines to take and when to take them  Follow directions  NSAIDs or acetaminophen  help to bring down a fever or decrease pain  Decongestants  help decrease nasal stuffiness  Antihistamines  help decrease sneezing and a runny nose       Cough suppressants  help decrease how much you cough  Expectorants  help loosen mucus so you can cough it up  Take your medicine as directed  Contact your healthcare provider if you think your medicine is not helping or if you have side effects  Tell him of her if you are allergic to any medicine  Keep a list of the medicines, vitamins, and herbs you take  Include the amounts, and when and why you take them  Bring the list or the pill bottles to follow-up visits  Carry your medicine list with you in case of an emergency  Symptom relief: The following may help relieve cold symptoms, such as a dry throat and congestion:  Gargle with mouthwash or warm salt water as directed  Suck on throat lozenges or hard candy  Use a cold or warm vaporizer or humidifier to ease your breathing  Rest for at least 2 days and then as needed to decrease tiredness and weakness  Use petroleum based jelly around your nostrils to decrease irritation from blowing your nose  Drink liquids:  Liquids will help thin and loosen thick mucus so you can cough it up  Liquids will also keep you hydrated  Ask your healthcare provider which liquids are best for you and how much to drink each day  Prevent the spread of germs: You can spread your cold germs to others for at least 3 days after your symptoms start  Wash your hands often  Do not share items, such as eating utensils  Cover your nose and mouth when you cough or sneeze using the crook of your elbow instead of your hands  Throw used tissues in the garbage  Do not smoke:  Smoking may worsen your symptoms and increase the length of time you feel sick  Talk with your healthcare provider if you need help to stop smoking  Follow up with your healthcare provider as directed:  Write down your questions so you remember to ask them during your visits     © 2017 [employer] [place] Information is for End User's use only and may not be sold, redistributed or otherwise used for commercial purposes  All illustrations and images included in CareNotes® are the copyrighted property of A D A M , Inc  or Alan Hines  The above information is an  only  It is not intended as medical advice for individual conditions or treatments  Talk to your doctor, nurse or pharmacist before following any medical regimen to see if it is safe and effective for you

## 2023-01-02 NOTE — CARE ANYWHERE EVISITS
Visit Summary for Jolynn Moseley - Gender: Female - Date of Birth: [id number]  Date: [id number] - Duration: 5 minutes  Patient: Jolynn Moseley  Provider: [id number]    Patient Contact Information  Address  [address] [place]; [address]  [phone]    Visit Topics  Flu-Like Symptoms [Added By: Self - 2023-01-02]  Cough, difficulty breathing while sleeping  [Added By: Self - 2023-01-02]  Cold [Added By: Self - 2023-01-02]    Triage Questions   What is your current physical address in the event of a medical emergency? Answer []  Are you allergic to any medications? Answer []  Are you now or could you be pregnant? Answer []  Do you have any immune system compromise or chronic lung   disease? Answer []  Do you have any vulnerable family members in the home (infant, pregnant, cancer, elderly)? Answer []     Conversation Transcripts  [0A][0A] [Notification] You are connected with Roopa Hernandez, Urgent Care Specialist [0A][Notification] Jolynn Moseley is located in [place]  [0A][Notification] Jolynn Moseley has shared health history  [employer]  [0A]    Diagnosis  Urinary tract infection, site not specified    Procedures  Value: [id number] Code: CPT-4 UNLISTED E&M SERVICE    Medications Prescribed    No prescriptions ordered    Electronically signed by: Roopa Geronimo(NPI [id number])

## 2023-01-09 ENCOUNTER — OFFICE VISIT (OUTPATIENT)
Dept: FAMILY MEDICINE CLINIC | Facility: CLINIC | Age: 38
End: 2023-01-09

## 2023-01-09 VITALS
DIASTOLIC BLOOD PRESSURE: 78 MMHG | HEIGHT: 68 IN | WEIGHT: 164.25 LBS | HEART RATE: 71 BPM | TEMPERATURE: 97.6 F | SYSTOLIC BLOOD PRESSURE: 120 MMHG | OXYGEN SATURATION: 97 % | BODY MASS INDEX: 24.89 KG/M2

## 2023-01-09 DIAGNOSIS — J01.20 ACUTE NON-RECURRENT ETHMOIDAL SINUSITIS: Primary | ICD-10-CM

## 2023-01-09 RX ORDER — SULFAMETHOXAZOLE AND TRIMETHOPRIM 800; 160 MG/1; MG/1
1 TABLET ORAL EVERY 12 HOURS SCHEDULED
Qty: 20 TABLET | Refills: 0 | Status: SHIPPED | OUTPATIENT
Start: 2023-01-09 | End: 2023-01-19

## 2023-01-09 NOTE — PROGRESS NOTES
Name: Sho Baez      : 1985      MRN: [id number]  Encounter Provider: Mony Jiang MD  Encounter Date: 2023   Encounter department: [place] [address]  Acute non-recurrent ethmoidal sinusitis  Comments:  Recommend Bactrim DS  Reviewed pregnancy and OCPs with antibiotics  Mucinex over-the-counter as needed  Orders:  -     sulfamethoxazole-trimethoprim (Bactrim DS) 800-160 mg per tablet; Take 1 tablet by mouth every 12 (twelve) hours for 10 days         Subjective      Chief Complaint   Patient presents with   • Cough   • Nasal Congestion   • Shortness of Breath   • Fatigue     Pt states clear drainage  Pt states the prednisone did not help  Pt did at home covid test yesterday that was neg   mgb       Please see chief complaint  Had virt visit last week, but no real changes  Some SOB, nasal congestion  Significant postnasal drip  Tooth pain  Pain and pressure behind the eyes  Patient reports he did do at least 2 COVID test, both of which were negative  Review of Systems   Constitutional: Positive for activity change and fatigue  Negative for chills and fever  HENT: Positive for congestion, postnasal drip, rhinorrhea, sinus pressure, sinus pain and sore throat ( Minor)  Negative for dental problem and trouble swallowing  Eyes: Negative  Respiratory: Positive for cough and shortness of breath  Negative for wheezing  Cardiovascular: Negative  Gastrointestinal: Positive for diarrhea, nausea and vomiting  Active and Past History reviewed  Allergies reviewed      Current Outpatient Medications on File Prior to Visit   Medication Sig   • albuterol (PROVENTIL HFA,VENTOLIN HFA) 90 mcg/act inhaler Inhale   • azelastine (ASTELIN) 0 1 % nasal spray 2 sprays into each nostril as needed     • cetirizine (ZyrTEC) 10 mg tablet Take 1 tablet (10 mg total) by mouth daily (Patient taking differently: Take 10 mg by mouth as needed) • escitalopram (LEXAPRO) 10 mg tablet Take 1 tablet (10 mg total) by mouth daily   • famotidine (PEPCID) 20 mg tablet Take 1 tablet (20 mg total) by mouth daily   • hyoscyamine (LEVSIN/SL) 0 125 mg SL tablet Take 1 tablet (0 125 mg total) by mouth every 6 (six) hours as needed for cramping or diarrhea   • metoprolol succinate (TOPROL-XL) 25 mg 24 hr tablet Take 1 tablet (25 mg total) by mouth daily   • multivitamin (THERAGRAN) TABS Take 1 tablet by mouth   • omeprazole (PriLOSEC) 20 mg delayed release capsule Take 1 capsule (20 mg total) by mouth daily       Objective     Data:      /78 (BP Location: Right arm, Patient Position: Sitting, Cuff Size: Standard)   Pulse 71   Temp 97 6 °F (36 4 °C) (Temporal)   Ht 5' 8" (1 727 m)   Wt 74 5 kg (164 lb 4 oz)   SpO2 97%   BMI 24 97 kg/m²     Physical Exam  Vitals and nursing note reviewed  Constitutional:       Appearance: She is well-developed  HENT:      Head: Normocephalic and atraumatic  Cardiovascular:      Rate and Rhythm: Normal rate and regular rhythm  Pulses:           Carotid pulses are 2+ on the right side and 2+ on the left side  Heart sounds: Normal heart sounds  Pulmonary:      Effort: Pulmonary effort is normal       Breath sounds: Normal breath sounds  No wheezing or rales  Chest:      Chest wall: No tenderness  Lymphadenopathy:      Cervical: No cervical adenopathy         Farzana Sofia MD

## 2023-01-09 NOTE — PATIENT INSTRUCTIONS
1  Acute non-recurrent ethmoidal sinusitis  Comments:  Recommend Bactrim DS  Reviewed pregnancy and OCPs with antibiotics  Mucinex over-the-counter as needed  Orders:  -     sulfamethoxazole-trimethoprim (Bactrim DS) 800-160 mg per tablet; Take 1 tablet by mouth every 12 (twelve) hours for 10 days        COVID 19 Instructions    Suze Chan was advised to limit contact with others to essential tasks such as getting food, medications, and medical care  Proper handwashing reviewed, and Hand sanitzer when washing is not available  If the patient develops symptoms of COVID 19, the patient should call the office as soon as possible  For 8768-8948 Flu season, it is strongly recommended that Flu Vaccinations be obtained  Virtual Visits:  [place]: This works on smart phones (any phone with Internet browsing capability)  You should get a text message when the provider is ready to see you  Click on the link in the text message, and the call should start  You will need to type in your name, and allow camera and microphone access  This is HIPPA compliant, and secure  If you have not already done so, get immunized to COVID 19  We are committed to getting you vaccinated as soon as possible and will be closely following CDC and [facility]  guidelines as they are released and revised  Please refer to our COVID-19 vaccine webpage for the most up to date information on the vaccine and our distribution efforts  This site will also have the most up to date recommendations for COVID booster vaccine  Marcello mckeon    Call [phone] ([phone]), option [phone]    OUR NEW LOCATION:    [address], [facility] [address], [facility] [place], [address]  Fax: [phone]    Lab services and OB/GYN are at this location as well

## 2023-01-13 ENCOUNTER — TELEMEDICINE (OUTPATIENT)
Dept: BEHAVIORAL/MENTAL HEALTH CLINIC | Facility: CLINIC | Age: 38
End: 2023-01-13

## 2023-01-13 DIAGNOSIS — F41.1 GENERALIZED ANXIETY DISORDER: Primary | ICD-10-CM

## 2023-01-13 DIAGNOSIS — F32.9 MAJOR DEPRESSIVE DISORDER WITH CURRENT ACTIVE EPISODE, UNSPECIFIED DEPRESSION EPISODE SEVERITY, UNSPECIFIED WHETHER RECURRENT: ICD-10-CM
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
## 2023-01-23 ENCOUNTER — TELEPHONE (OUTPATIENT)
Dept: BEHAVIORAL/MENTAL HEALTH CLINIC | Facility: CLINIC | Age: 38
End: 2023-01-23

## 2023-02-08 ENCOUNTER — TELEPHONE (OUTPATIENT)
Dept: BEHAVIORAL/MENTAL HEALTH CLINIC | Facility: CLINIC | Age: 38
End: 2023-02-08

## 2023-02-10 ENCOUNTER — OFFICE VISIT (OUTPATIENT)
Dept: FAMILY MEDICINE CLINIC | Facility: CLINIC | Age: 38
End: 2023-02-10

## 2023-02-10 VITALS
WEIGHT: 168 LBS | DIASTOLIC BLOOD PRESSURE: 80 MMHG | BODY MASS INDEX: 25.46 KG/M2 | HEART RATE: 78 BPM | HEIGHT: 68 IN | SYSTOLIC BLOOD PRESSURE: 100 MMHG

## 2023-02-10 DIAGNOSIS — F41.1 GENERALIZED ANXIETY DISORDER: Primary | ICD-10-CM

## 2023-02-10 DIAGNOSIS — R00.2 HEART PALPITATIONS: ICD-10-CM

## 2023-02-10 RX ORDER — ESCITALOPRAM OXALATE 10 MG/1
15 TABLET ORAL DAILY
Qty: 135 TABLET | Refills: 3 | Status: SHIPPED | OUTPATIENT
Start: 2023-02-10 | End: 2023-05-11

## 2023-02-10 RX ORDER — ALPRAZOLAM 0.25 MG/1
0.25 TABLET ORAL
Qty: 15 TABLET | Refills: 0 | Status: SHIPPED | OUTPATIENT
Start: 2023-02-10

## 2023-02-10 NOTE — PROGRESS NOTES
Name: Abdirahman Osorio      : 1985      MRN: [id number]  Encounter Provider: Anayeli Lambert PA-C  Encounter Date: 2/10/2023   Encounter department: [facility]    Assessment & Plan     1  Generalized anxiety disorder  Assessment & Plan:  Uncontrolled so will increase lexapro to 15 mg  And add as needed xanax   25 mg  PMED check and verified  See back in 4-6 weeks  Orders:  -     escitalopram (LEXAPRO) 10 mg tablet; Take 1 5 tablets (15 mg total) by mouth daily  -     ALPRAZolam (XANAX) 0 25 mg tablet; Take 1 tablet (0 25 mg total) by mouth daily at bedtime as needed for anxiety    2  Heart palpitations    BMI Counseling: Body mass index is 25 54 kg/m²  The BMI is above normal  Nutrition recommendations include decreasing portion sizes, encouraging healthy choices of fruits and vegetables, decreasing fast food intake, consuming healthier snacks and limiting drinks that contain sugar  Rationale for BMI follow-up plan is due to patient being overweight or obese  Subjective      Patient has been on Lexapro 10 mg once daily since 2018  Is been working great up until a month or so ago  She is approaching a very big birthday of 34 her father passed away 8 years ago she is finishing up schooling and will be done this may  She feels like all of this is really having her basically fighting off panic attacks almost every other day  Her  had a 0 25 Xanax and she took half of it and it did help her but she would really like to see what other options that she has  No SI or HI  Review of Systems   Constitutional: Negative  HENT: Negative  Eyes: Negative  Respiratory: Negative  Cardiovascular: Negative  Gastrointestinal: Negative  Endocrine: Negative  Genitourinary: Negative  Musculoskeletal: Negative  Skin: Negative  Allergic/Immunologic: Negative  Neurological: Negative  Hematological: Negative  Psychiatric/Behavioral: Negative  Current Outpatient Medications on File Prior to Visit   Medication Sig   • albuterol (PROVENTIL HFA,VENTOLIN HFA) 90 mcg/act inhaler Inhale   • azelastine (ASTELIN) 0 1 % nasal spray 2 sprays into each nostril as needed     • famotidine (PEPCID) 20 mg tablet Take 1 tablet (20 mg total) by mouth daily   • metoprolol succinate (TOPROL-XL) 25 mg 24 hr tablet Take 1 tablet (25 mg total) by mouth daily   • multivitamin (THERAGRAN) TABS Take 1 tablet by mouth   • omeprazole (PriLOSEC) 20 mg delayed release capsule Take 1 capsule (20 mg total) by mouth daily   • [DISCONTINUED] escitalopram (LEXAPRO) 10 mg tablet Take 1 tablet (10 mg total) by mouth daily   • cetirizine (ZyrTEC) 10 mg tablet Take 1 tablet (10 mg total) by mouth daily (Patient taking differently: Take 10 mg by mouth as needed)   • hyoscyamine (LEVSIN/SL) 0 125 mg SL tablet Take 1 tablet (0 125 mg total) by mouth every 6 (six) hours as needed for cramping or diarrhea       Objective     /80 (BP Location: Right arm, Patient Position: Sitting, Cuff Size: Standard)   Pulse 78   Ht 5' 8" (1 727 m)   Wt 76 2 kg (168 lb)   BMI 25 54 kg/m²     Physical Exam  Vitals and nursing note reviewed  Constitutional:       General: She is not in acute distress  Appearance: She is well-developed  She is not diaphoretic  HENT:      Head: Normocephalic and atraumatic  Eyes:      General:         Right eye: No discharge  Left eye: No discharge  Conjunctiva/sclera: Conjunctivae normal    Neck:      Vascular: No carotid bruit  Cardiovascular:      Rate and Rhythm: Normal rate and regular rhythm  Heart sounds: Normal heart sounds  No murmur heard  No friction rub  No gallop  Pulmonary:      Effort: Pulmonary effort is normal  No respiratory distress  Breath sounds: Normal breath sounds  No wheezing or rales  Musculoskeletal:      Cervical back: Neck supple  Skin:     General: Skin is warm and dry     Neurological: Mental Status: She is alert and oriented to person, place, and time     Psychiatric:         Judgment: Judgment normal        Carlos Ramon PA-C

## 2023-02-10 NOTE — PATIENT INSTRUCTIONS
1  Generalized anxiety disorder  Assessment & Plan:  Uncontrolled so will increase lexapro to 15 mg  And add as needed xanax   25 mg  PMED check and verified  See back in 4-6 weeks  Orders:  -     escitalopram (LEXAPRO) 10 mg tablet; Take 1 5 tablets (15 mg total) by mouth daily  -     ALPRAZolam (XANAX) 0 25 mg tablet; Take 1 tablet (0 25 mg total) by mouth daily at bedtime as needed for anxiety    2   Heart palpitations

## 2023-02-23 ENCOUNTER — TELEMEDICINE (OUTPATIENT)
Dept: BEHAVIORAL/MENTAL HEALTH CLINIC | Facility: CLINIC | Age: 38
End: 2023-02-23

## 2023-02-23 DIAGNOSIS — F41.1 GENERALIZED ANXIETY DISORDER: ICD-10-CM

## 2023-02-23 DIAGNOSIS — F32.9 MAJOR DEPRESSIVE DISORDER WITH CURRENT ACTIVE EPISODE, UNSPECIFIED DEPRESSION EPISODE SEVERITY, UNSPECIFIED WHETHER RECURRENT: Primary | ICD-10-CM

## 2023-02-24 ENCOUNTER — RA CDI HCC (OUTPATIENT)
Dept: OTHER | Facility: HOSPITAL | Age: 38
End: 2023-02-24

## 2023-02-24 NOTE — PROGRESS NOTES
[facility]  coding opportunities          Chart Reviewed number of suggestions sent to Provider: 1     Patients Insurance        Commercial Insurance: [employer]       J45 20:  Mild intermittent asthma, uncomplicated [663805]    Found in active problem list - please review and assess and document per MEAT if applicable for 6468
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
## 2023-03-10 ENCOUNTER — OFFICE VISIT (OUTPATIENT)
Dept: FAMILY MEDICINE CLINIC | Facility: CLINIC | Age: 38
End: 2023-03-10

## 2023-03-10 VITALS
DIASTOLIC BLOOD PRESSURE: 78 MMHG | BODY MASS INDEX: 25.76 KG/M2 | WEIGHT: 170 LBS | HEART RATE: 70 BPM | HEIGHT: 68 IN | TEMPERATURE: 97.8 F | SYSTOLIC BLOOD PRESSURE: 110 MMHG

## 2023-03-10 DIAGNOSIS — F32.9 MAJOR DEPRESSIVE DISORDER WITH CURRENT ACTIVE EPISODE, UNSPECIFIED DEPRESSION EPISODE SEVERITY, UNSPECIFIED WHETHER RECURRENT: ICD-10-CM

## 2023-03-10 DIAGNOSIS — F41.1 GENERALIZED ANXIETY DISORDER: Primary | ICD-10-CM

## 2023-03-10 DIAGNOSIS — J06.9 ACUTE UPPER RESPIRATORY INFECTION: ICD-10-CM

## 2023-03-10 PROBLEM — Z00.00 ENCOUNTER FOR PHYSICAL EXAMINATION: Status: RESOLVED | Noted: 2021-05-24 | Resolved: 2023-03-10

## 2023-03-10 PROBLEM — R03.0 ELEVATED BLOOD PRESSURE READING: Status: RESOLVED | Noted: 2018-12-20 | Resolved: 2023-03-10

## 2023-03-10 RX ORDER — SULFAMETHOXAZOLE AND TRIMETHOPRIM 800; 160 MG/1; MG/1
1 TABLET ORAL EVERY 12 HOURS SCHEDULED
Qty: 20 TABLET | Refills: 0 | Status: SHIPPED | OUTPATIENT
Start: 2023-03-10 | End: 2023-03-20

## 2023-03-10 NOTE — PATIENT INSTRUCTIONS
1  Generalized anxiety disorder  Assessment & Plan:  Much improved with increase of Lexapro to 15 mg once daily and as needed Xanax which patient is only used to have thus far  We will have her continue this and recheck in 6 months sooner if needed  2  Acute upper respiratory infection  Assessment & Plan:  PT is close to a sinus infection but does not want to her antibiotic at this time and I will be on vacation next week so I am going to call in an antibiotic to her pharmacy and have the pharmacy only fill if she calls if needed  Orders:  -     sulfamethoxazole-trimethoprim (BACTRIM DS) 800-160 mg per tablet; Take 1 tablet by mouth every 12 (twelve) hours for 10 days    3  Major depressive disorder with current active episode, unspecified depression episode severity, unspecified whether recurrent  Assessment & Plan:  Stable on Lexapro without SI or HI

## 2023-03-10 NOTE — ASSESSMENT & PLAN NOTE
PT is close to a sinus infection but does not want to her antibiotic at this time and I will be on vacation next week so I am going to call in an antibiotic to her pharmacy and have the pharmacy only fill if she calls if needed

## 2023-03-10 NOTE — ASSESSMENT & PLAN NOTE
Much improved with increase of Lexapro to 15 mg once daily and as needed Xanax which patient is only used to have thus far  We will have her continue this and recheck in 6 months sooner if needed

## 2023-03-10 NOTE — PROGRESS NOTES
Name: Rhiannon Carlin      : 1985      MRN: [id number]  Encounter Provider: Reba Tamayo PA-C  Encounter Date: 3/10/2023   Encounter department: [facility] PRIMARY CARE    Assessment & Plan     1  Generalized anxiety disorder  Assessment & Plan:  Much improved with increase of Lexapro to 15 mg once daily and as needed Xanax which patient is only used to have thus far  We will have her continue this and recheck in 6 months sooner if needed  2  Acute upper respiratory infection  Assessment & Plan:  PT is close to a sinus infection but does not want to her antibiotic at this time and I will be on vacation next week so I am going to call in an antibiotic to her pharmacy and have the pharmacy only fill if she calls if needed  Orders:  -     sulfamethoxazole-trimethoprim (BACTRIM DS) 800-160 mg per tablet; Take 1 tablet by mouth every 12 (twelve) hours for 10 days    3  Major depressive disorder with current active episode, unspecified depression episode severity, unspecified whether recurrent  Assessment & Plan:  Stable on Lexapro without SI or HI  Subjective      Patient here today for a recheck on her Lexapro increased from a month ago  We increased her to 50 mg once daily patient states that it kicked in about a week and a half ago and she is doing great and has only needed to take 2 Xanax so far  Patient has been sick for the past week colored mucus in the morning but clears throughout the day patient has been using pseudoephedrine over-the-counter and does not feel she needs an antibiotic now  Patient will be graduating in May hoping to get a job with [employer] and will need a PPD and a COVID booster which she can schedule with nursing  Review of Systems   Constitutional: Negative  HENT: Negative  Eyes: Negative  Respiratory: Negative  Cardiovascular: Negative  Gastrointestinal: Negative  Endocrine: Negative  Genitourinary: Negative  Musculoskeletal: Negative  Skin: Negative  Allergic/Immunologic: Negative  Neurological: Negative  Hematological: Negative  Psychiatric/Behavioral: Negative  Current Outpatient Medications on File Prior to Visit   Medication Sig   • albuterol (PROVENTIL HFA,VENTOLIN HFA) 90 mcg/act inhaler Inhale   • ALPRAZolam (XANAX) 0 25 mg tablet Take 1 tablet (0 25 mg total) by mouth daily at bedtime as needed for anxiety   • azelastine (ASTELIN) 0 1 % nasal spray 2 sprays into each nostril as needed     • cetirizine (ZyrTEC) 10 mg tablet Take 1 tablet (10 mg total) by mouth daily (Patient taking differently: Take 10 mg by mouth as needed)   • escitalopram (LEXAPRO) 10 mg tablet Take 1 5 tablets (15 mg total) by mouth daily   • famotidine (PEPCID) 20 mg tablet Take 1 tablet (20 mg total) by mouth daily   • hyoscyamine (LEVSIN/SL) 0 125 mg SL tablet Take 1 tablet (0 125 mg total) by mouth every 6 (six) hours as needed for cramping or diarrhea   • metoprolol succinate (TOPROL-XL) 25 mg 24 hr tablet Take 1 tablet (25 mg total) by mouth daily   • multivitamin (THERAGRAN) TABS Take 1 tablet by mouth   • omeprazole (PriLOSEC) 20 mg delayed release capsule Take 1 capsule (20 mg total) by mouth daily       Objective     /78 (BP Location: Left arm, Patient Position: Sitting, Cuff Size: Adult)   Pulse 70   Temp 97 8 °F (36 6 °C) (Temporal)   Ht 5' 8" (1 727 m)   Wt 77 1 kg (170 lb)   BMI 25 85 kg/m²     Physical Exam  Vitals and nursing note reviewed  Constitutional:       General: She is not in acute distress  Appearance: She is well-developed  She is not diaphoretic  HENT:      Head: Normocephalic and atraumatic  Eyes:      General:         Right eye: No discharge  Left eye: No discharge  Conjunctiva/sclera: Conjunctivae normal    Neck:      Vascular: No carotid bruit  Cardiovascular:      Rate and Rhythm: Normal rate and regular rhythm  Heart sounds: Normal heart sounds  No murmur heard  No friction rub  No gallop  Pulmonary:      Effort: Pulmonary effort is normal  No respiratory distress  Breath sounds: Normal breath sounds  No wheezing or rales  Musculoskeletal:      Cervical back: Neck supple  Skin:     General: Skin is warm and dry  Neurological:      Mental Status: She is alert and oriented to person, place, and time     Psychiatric:         Judgment: Judgment normal        Thor Bamberger, PA-C

## 2023-03-14 ENCOUNTER — CLINICAL SUPPORT (OUTPATIENT)
Dept: FAMILY MEDICINE CLINIC | Facility: CLINIC | Age: 38
End: 2023-03-14

## 2023-03-14 DIAGNOSIS — Z11.1 VISIT FOR TB SKIN TEST: Primary | ICD-10-CM

## 2023-03-14 NOTE — PROGRESS NOTES
PPD placed on left lower forearm-mjs  She does not have a form to fill out  Our PPD card is in orange folder

## 2023-03-16 ENCOUNTER — CLINICAL SUPPORT (OUTPATIENT)
Dept: FAMILY MEDICINE CLINIC | Facility: CLINIC | Age: 38
End: 2023-03-16

## 2023-03-16 DIAGNOSIS — Z11.1 ENCOUNTER FOR PPD SKIN TEST READING: Primary | ICD-10-CM

## 2023-03-16 LAB
INDURATION: 0 MM
TB SKIN TEST: NEGATIVE

## 2023-03-23 ENCOUNTER — TELEMEDICINE (OUTPATIENT)
Dept: BEHAVIORAL/MENTAL HEALTH CLINIC | Facility: CLINIC | Age: 38
End: 2023-03-23

## 2023-03-23 DIAGNOSIS — F32.9 MAJOR DEPRESSIVE DISORDER WITH CURRENT ACTIVE EPISODE, UNSPECIFIED DEPRESSION EPISODE SEVERITY, UNSPECIFIED WHETHER RECURRENT: Primary | ICD-10-CM

## 2023-03-23 DIAGNOSIS — F41.1 GENERALIZED ANXIETY DISORDER: ICD-10-CM

## 2023-03-23 NOTE — PSYCH
Virtual Regular Visit    Verification of patient location:    Patient is located in the following state in which I hold an active license [place]      Assessment/Plan:    Problem List Items Addressed This Visit        Other    Anxiety disorder    Depression - Primary       Goals addressed in session: Goal 1 and Goal 2          Reason for visit is   Chief Complaint   Patient presents with   • Virtual Regular Visit        Encounter provider Rafiq Flores    Provider located at [address] [place] [address] [facility]  [phone]      Recent Visits  No visits were found meeting these conditions  Showing recent visits within past 7 days and meeting all other requirements  Today's Visits  Date Type Provider Dept   03/23/23 [facility] today's visits and meeting all other requirements  Future Appointments  No visits were found meeting these conditions  Showing future appointments within next 150 days and meeting all other requirements       The patient was identified by name and date of birth  Supa Peterson was informed that this is a telemedicine visit and that the visit is being conducted through the [facility]  She agrees to proceed     My office door was closed  No one else was in the room  She acknowledged consent and understanding of privacy and security of the video platform  The patient has agreed to participate and understands they can discontinue the visit at any time  Patient is aware this is a billable service           HPI     Past Medical History:   Diagnosis Date   • Anxiety    • Depression    • Eating disorder        Past Surgical History:   Procedure Laterality Date   • WISDOM TOOTH EXTRACTION         Current Outpatient Medications   Medication Sig Dispense Refill   • albuterol (PROVENTIL HFA,VENTOLIN HFA) 90 mcg/act inhaler Inhale     • ALPRAZolam (XANAX) 0 25 mg tablet Take 1 tablet (0 25 mg total) by mouth daily at bedtime as needed for anxiety 15 tablet 0   • azelastine (ASTELIN) 0 1 % nasal spray 2 sprays into each nostril as needed       • cetirizine (ZyrTEC) 10 mg tablet Take 1 tablet (10 mg total) by mouth daily (Patient taking differently: Take 10 mg by mouth as needed) 90 tablet 3   • escitalopram (LEXAPRO) 10 mg tablet Take 1 5 tablets (15 mg total) by mouth daily 135 tablet 3   • famotidine (PEPCID) 20 mg tablet Take 1 tablet (20 mg total) by mouth daily 90 tablet 3   • hyoscyamine (LEVSIN/SL) 0 125 mg SL tablet Take 1 tablet (0 125 mg total) by mouth every 6 (six) hours as needed for cramping or diarrhea 60 tablet 5   • metoprolol succinate (TOPROL-XL) 25 mg 24 hr tablet Take 1 tablet (25 mg total) by mouth daily 90 tablet 3   • multivitamin (THERAGRAN) TABS Take 1 tablet by mouth     • omeprazole (PriLOSEC) 20 mg delayed release capsule Take 1 capsule (20 mg total) by mouth daily 90 capsule 3     No current facility-administered medications for this visit  Allergies   Allergen Reactions   • Bee Venom    • Codeine      Other reaction(s): Nausea and/or vomiting   • Oxycodone-Acetaminophen Vomiting       Review of Systems    Video Exam    There were no vitals filed for this visit  Physical Exam     Behavioral Health Psychotherapy Progress Note    Psychotherapy Provided: Individual Psychotherapy     1  Major depressive disorder with current active episode, unspecified depression episode severity, unspecified whether recurrent        2  Generalized anxiety disorder            Goals addressed in session: Goal 1 and Goal 2     DATA: Ebony Jorgensen stated that she has been doing well overall  She stated that 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  She stated that she is very excited for this position   She stated that her  recently had a mishap "that is requiring surgery  She stated that despite this they have been getting along well and are excited for their future with her completing her education  She also discussed her relationship with her mother and sister and the progress that they have made  [address] 23 her path forward and what her goals are  Discussing completing her semester despite feeling burned out  Giving positive feedback and supportive therapy  During this session, this clinician used the following therapeutic modalities: Cognitive Behavioral Therapy    Substance Abuse was not addressed during this session  If the client is diagnosed with a co-occurring substance use disorder, please indicate any changes in the frequency or amount of use: N/A  Stage of change for addressing substance use diagnoses: No substance use/Not applicable    ASSESSMENT:  Sheba Sinclair presents with a Euthymic/ normal mood  her affect is Normal range and intensity, which is congruent, with her mood and the content of the session  The client has made progress on their goals  Continues to build and maintain healthy relationships in her life Sheba Sinclair presents with a none risk of suicide, none risk of self-harm, and none risk of harm to others  For any risk assessment that surpasses a \"low\" rating, a safety plan must be developed  A safety plan was indicated: no  If yes, describe in detail N/A    PLAN: Between sessions, Sheba Sinclair will continue to build and maintain healthy relationship in her life  At the next session, the therapist will use Cognitive Behavioral Therapy to address treatment goals  Behavioral Health Treatment Plan and Discharge Planning: Sheba Sinclair is aware of and agrees to continue to work on their treatment plan  They have identified and are working toward their discharge goals   yes    Visit start and stop times:    03/23/23  Start Time: 1004  Stop Time: 1043  Total Visit Time: 39 minutes    "

## 2023-04-03 ENCOUNTER — HOSPITAL ENCOUNTER (OUTPATIENT)
Dept: MAMMOGRAPHY | Facility: MEDICAL CENTER | Age: 38
Discharge: HOME | End: 2023-04-03

## 2023-04-03 VITALS — BODY MASS INDEX: 25.76 KG/M2 | HEIGHT: 68 IN | WEIGHT: 169.97 LBS

## 2023-04-03 DIAGNOSIS — Z12.31 ENCOUNTER FOR SCREENING MAMMOGRAM FOR MALIGNANT NEOPLASM OF BREAST: ICD-10-CM

## 2023-04-05 DIAGNOSIS — R92.8 ABNORMAL MAMMOGRAM: Primary | ICD-10-CM

## 2023-04-05 NOTE — RESULT ENCOUNTER NOTE
Please make sure pt is aware her screening mammo found some abnormalities which require additional eval  US has been ordered

## 2023-05-09 ENCOUNTER — OFFICE VISIT (OUTPATIENT)
Dept: URGENT CARE | Facility: MEDICAL CENTER | Age: 38
End: 2023-05-09

## 2023-05-09 VITALS
RESPIRATION RATE: 20 BRPM | DIASTOLIC BLOOD PRESSURE: 96 MMHG | OXYGEN SATURATION: 96 % | HEART RATE: 76 BPM | SYSTOLIC BLOOD PRESSURE: 138 MMHG | TEMPERATURE: 98.7 F

## 2023-05-09 DIAGNOSIS — M54.50 ACUTE LOW BACK PAIN, UNSPECIFIED BACK PAIN LATERALITY, UNSPECIFIED WHETHER SCIATICA PRESENT: Primary | ICD-10-CM

## 2023-05-09 PROBLEM — J06.9 ACUTE UPPER RESPIRATORY INFECTION: Status: RESOLVED | Noted: 2022-04-22 | Resolved: 2023-05-09

## 2023-05-09 RX ORDER — METHOCARBAMOL 500 MG/1
500 TABLET, FILM COATED ORAL 4 TIMES DAILY
Qty: 40 TABLET | Refills: 0 | Status: SHIPPED | OUTPATIENT
Start: 2023-05-09

## 2023-05-09 RX ORDER — LIDOCAINE 50 MG/G
1 PATCH TOPICAL DAILY
Qty: 30 PATCH | Refills: 0 | Status: SHIPPED | OUTPATIENT
Start: 2023-05-09

## 2023-05-09 RX ORDER — NAPROXEN 500 MG/1
500 TABLET ORAL 2 TIMES DAILY WITH MEALS
Qty: 30 TABLET | Refills: 0 | Status: SHIPPED | OUTPATIENT
Start: 2023-05-09

## 2023-05-09 NOTE — PROGRESS NOTES
[id number] [facility]        NAME: Alexia Suarez is a 34 y o  female  : 1985    MRN: [id number]  DATE: May 9, 2023  TIME: 2:31 PM    Assessment and Plan   Acute low back pain, unspecified back pain laterality, unspecified whether sciatica present [M54 50]  1  Acute low back pain, unspecified back pain laterality, unspecified whether sciatica present  methocarbamol (ROBAXIN) 500 mg tablet    naproxen (Naprosyn) 500 mg tablet    lidocaine (Lidoderm) 5 %        Discussed using the naproxen on as needed and to never take xanax with a muscle relaxer    Patient Instructions   Patient Instructions   The first line treatment for low back pain is NSAIDS such as Advil/Naproxen for two-four weeks  You can use Ibuprofen over the counter at the dose of 400-600 4x a day or Naproxen over the counter 250 2x a day  I am prescribing the Naproxen at a higher dose of 500 for twice a day  Tylenol has not benefit for improving low back pain but may be used as an added pain relief  I am also prescribing you a muscle relaxer  Use that as needed for the pain but do not drive on this medication  I recommend staying active and using heat for 20 minutes every 3-4 hours  What are the parts of the back? -- The back is made up of   ? Vertebrae - A stack of bones that sit on top of one another like a stack of coins  Each of these bones has a hole in the center  When stacked, the bones form a hollow tube that protects the spinal cord  ? Discs - Rubbery discs sit in between each of the vertebrae to add cushion and allow movement  ? Spinal cord and nerves - The spinal cord is the highway of nerves that connects the brain to the rest of the body  It runs through the vertebrae within the spinal canal  Nerves branch from the spinal cord and pass in between the vertebrae  From there they connect to the arms, the legs, and the organs  (This is why problems in the back can cause leg pain or bladder or bowel problems )  ? Muscles, "tendons, and ligaments - Together the muscles, tendons, and ligaments are called the \"soft tissues\" of the back  These soft tissues support the back and help hold it together  What causes low back pain? -- Many different things can cause low back pain  Most of the time, doctors do not know the exact cause  Back pain can happen if you strain a muscle  This is often what has happened when a person \"throws out\" their back  This refers to pain that starts suddenly after physical activity, like lifting something heavy or bending the back  Back pain can also happen if you have:  ?Damaged, bulging, or torn discs  ? Arthritis affecting the joints of the spine  ? Bony growths on the vertebrae that crowd nearby nerves  ? A vertebra out of place  ? Narrowing in the spinal canal    Should I get an imaging test? -- Most people do not need an imaging test such an X-ray, CT scan, or MRI  Most cases of back pain go away a few weeks  Doctors usually do not order imaging tests unless there are signs of something unusual   If your doctor does not order an imaging test, do not worry  They can still learn a lot about your pain just from looking you over and talking with you  How can the doctor or nurse tell what is wrong just by talking to me? -- Your symptoms tell your doctor or nurse a lot about the cause of your pain  For example:  ? If your pain started after you did something specific, like lifting a heavy object or twisting your back, you might have strained a muscle  ?If your pain spreads down the back of one thigh, it could be a sign that one of the nerves that go to your leg is being pinched by a bulging or torn disc  ?If your pain goes all the way down both legs, it could be a sign that you have a narrowed spinal canal  This is most often due to bony growths on your spine      How is back pain treated? -- Most people with an episode of low back pain do not have a serious medical problem, and can try simple treatments such " "as:  ?Staying active - The best thing you can do is to stay as active as possible  People with low back pain recover faster if they stay active  If your pain is severe, you might need to rest for a day or 2  But it's important to get back to walking and moving as soon as possible  While you should avoid heavy lifting and sports while your back hurts, try to keep doing your normal daily activities  Exercises you can try include walking, swimming, or using an exercise bike  Some people also find that [address] or yoga can help with their back pain  Finding activities you enjoy can help you stay active  ? Heat - Some people find that it helps to use a heating pad or heated wrap  Be careful to avoid high heat settings to prevent skin burns  ?Medicines - First, you can try pain medicines that you can get without a prescription  In many cases, doctors suggest first trying a nonsteroidal antiinflammatory drug, or \"NSAID  \" NSAIDs include ibuprofen (sample brand names: Advil, Motrin) and naproxen (sample brand name: Aleve)  These might work better than acetaminophen (Tylenol) for back pain  If non-prescription medicines do not help, let your doctor or nurse know  In some cases, doctors prescribe a medicine to relax the muscles (called a \"muscle relaxant\")  But keep in mind that muscle relaxants are not generally used in people older than 65  In older people, these medicines can cause side effects such as trouble urinating or confusion  ? Reducing stress - Some people find that it helps to try something called \"mindfulness-based stress reduction  \" This involves going to a group program to practice relaxation and meditation  If your back pain is making you feel anxious or depressed, talk to your doctor or nurse  There are other treatments that can help with these problems  Some people wonder if injections (shots) can help to relieve back pain   In some cases, doctors might recommend a shot of medicine to numb the area or " reduce swelling  But this has only been proven to work in specific situations  Follow up with PCP in 3-5 days  Proceed to  ER if symptoms worsen  Chief Complaint     Chief Complaint   Patient presents with   • Back Pain     Having low back spasms that started yesterday afternoon  Has hx of herniated discs and this problem before but usually she is able to manage the pain  This time she is unable to get any relief and pain not going away or getting any better  Pain is 4/10 but causes to be severely uncomfortable  History of Present Illness       The pt is a 42-year-old female presenting for LBP  Hx of this and normally a muscle relaxer helps  Started yesterday afternoon  Hx of herniated discs as well  Tried ice and home PT exercises  Pain is now a 4/10 and an achy pain  No radiation  No sadel anesthesia  Review of Systems   Review of Systems   Musculoskeletal: Positive for back pain  Skin: Negative for color change           Current Medications       Current Outpatient Medications:   •  ALPRAZolam (XANAX) 0 25 mg tablet, Take 1 tablet (0 25 mg total) by mouth daily at bedtime as needed for anxiety, Disp: 15 tablet, Rfl: 0  •  azelastine (ASTELIN) 0 1 % nasal spray, 2 sprays into each nostril as needed  , Disp: , Rfl:   •  escitalopram (LEXAPRO) 10 mg tablet, Take 1 5 tablets (15 mg total) by mouth daily, Disp: 135 tablet, Rfl: 3  •  famotidine (PEPCID) 20 mg tablet, Take 1 tablet (20 mg total) by mouth daily, Disp: 90 tablet, Rfl: 3  •  lidocaine (Lidoderm) 5 %, Apply 1 patch topically over 12 hours daily Remove & Discard patch within 12 hours or as directed by MD, Disp: 30 patch, Rfl: 0  •  methocarbamol (ROBAXIN) 500 mg tablet, Take 1 tablet (500 mg total) by mouth 4 (four) times a day, Disp: 40 tablet, Rfl: 0  •  metoprolol succinate (TOPROL-XL) 25 mg 24 hr tablet, TAKE ONE TABLET BY MOUTH EVERY DAY, Disp: 90 tablet, Rfl: 3  •  multivitamin (THERAGRAN) TABS, Take 1 tablet by mouth, Disp: , Rfl:   •  naproxen (Naprosyn) 500 mg tablet, Take 1 tablet (500 mg total) by mouth 2 (two) times a day with meals, Disp: 30 tablet, Rfl: 0  •  omeprazole (PriLOSEC) 20 mg delayed release capsule, Take 1 capsule (20 mg total) by mouth daily, Disp: 90 capsule, Rfl: 3  •  albuterol (PROVENTIL HFA,VENTOLIN HFA) 90 mcg/act inhaler, Inhale (Patient not taking: Reported on 5/9/2023), Disp: , Rfl:   •  cetirizine (ZyrTEC) 10 mg tablet, Take 1 tablet (10 mg total) by mouth daily (Patient taking differently: Take 10 mg by mouth as needed), Disp: 90 tablet, Rfl: 3  •  hyoscyamine (LEVSIN/SL) 0 125 mg SL tablet, Take 1 tablet (0 125 mg total) by mouth every 6 (six) hours as needed for cramping or diarrhea, Disp: 60 tablet, Rfl: 5    Current Allergies     Allergies as of 05/09/2023 - Reviewed 05/09/2023   Allergen Reaction Noted   • Bee venom  02/06/2018   • Codeine  07/10/2017   • Oxycodone-acetaminophen Vomiting 03/25/2016            The following portions of the patient's history were reviewed and updated as appropriate: allergies, current medications, past family history, past medical history, past social history, past surgical history and problem list      Past Medical History:   Diagnosis Date   • Anxiety    • Depression    • Eating disorder        Past Surgical History:   Procedure Laterality Date   • US BREAST NEEDLE LOC LEFT Left 5/9/2012   • WISDOM TOOTH EXTRACTION         Family History   Problem Relation Age of Onset   • Irregular heart beat Mother         Proxysmal Atrial Tachycardia, PVC   • Asthma Father    • Hyperlipidemia Father    • Hypertension Father    • Anxiety disorder Father    • No Known Problems Sister    • Uterine cancer Maternal Grandmother 61   • Prostate cancer Maternal Grandfather         61   • No Known Problems Paternal Grandmother    • Emphysema Paternal Grandfather    • Ovarian cancer Maternal Aunt 66   • Breast cancer Maternal Aunt 67         Medications have been verified          Objective /96   Pulse 76   Temp 98 7 °F (37 1 °C)   Resp 20   SpO2 96%        Physical Exam     Physical Exam  Vitals and nursing note reviewed  Constitutional:       General: She is not in acute distress  Appearance: Normal appearance  She is normal weight  She is not ill-appearing, toxic-appearing or diaphoretic  HENT:      Head: Normocephalic and atraumatic  Cardiovascular:      Rate and Rhythm: Normal rate and regular rhythm  Heart sounds: Normal heart sounds  No murmur heard  No friction rub  No gallop  Pulmonary:      Effort: Pulmonary effort is normal  No respiratory distress  Breath sounds: Normal breath sounds  No stridor  No wheezing, rhonchi or rales  Chest:      Chest wall: No tenderness  Musculoskeletal:         General: Tenderness (low back and right SI joint) present  No swelling  Normal range of motion  Skin:     General: Skin is warm and dry  Capillary Refill: Capillary refill takes less than 2 seconds  Neurological:      Mental Status: She is alert

## 2023-05-09 NOTE — PATIENT INSTRUCTIONS
"The first line treatment for low back pain is NSAIDS such as Advil/Naproxen for two-four weeks  You can use Ibuprofen over the counter at the dose of 400-600 4x a day or Naproxen over the counter 250 2x a day  I am prescribing the Naproxen at a higher dose of 500 for twice a day  Tylenol has not benefit for improving low back pain but may be used as an added pain relief  I am also prescribing you a muscle relaxer  Use that as needed for the pain but do not drive on this medication  I recommend staying active and using heat for 20 minutes every 3-4 hours  What are the parts of the back? -- The back is made up of   ? Vertebrae - A stack of bones that sit on top of one another like a stack of coins  Each of these bones has a hole in the center  When stacked, the bones form a hollow tube that protects the spinal cord  ? Discs - Rubbery discs sit in between each of the vertebrae to add cushion and allow movement  ? Spinal cord and nerves - The spinal cord is the highway of nerves that connects the brain to the rest of the body  It runs through the vertebrae within the spinal canal  Nerves branch from the spinal cord and pass in between the vertebrae  From there they connect to the arms, the legs, and the organs  (This is why problems in the back can cause leg pain or bladder or bowel problems )  ? Muscles, tendons, and ligaments - Together the muscles, tendons, and ligaments are called the \"soft tissues\" of the back  These soft tissues support the back and help hold it together  What causes low back pain? -- Many different things can cause low back pain  Most of the time, doctors do not know the exact cause  Back pain can happen if you strain a muscle  This is often what has happened when a person \"throws out\" their back  This refers to pain that starts suddenly after physical activity, like lifting something heavy or bending the back    Back pain can also happen if you have:  ?Damaged, bulging, or torn " discs  ? Arthritis affecting the joints of the spine  ? Bony growths on the vertebrae that crowd nearby nerves  ? A vertebra out of place  ? Narrowing in the spinal canal    Should I get an imaging test? -- Most people do not need an imaging test such an X-ray, CT scan, or MRI  Most cases of back pain go away a few weeks  Doctors usually do not order imaging tests unless there are signs of something unusual   If your doctor does not order an imaging test, do not worry  They can still learn a lot about your pain just from looking you over and talking with you  How can the doctor or nurse tell what is wrong just by talking to me? -- Your symptoms tell your doctor or nurse a lot about the cause of your pain  For example:  ? If your pain started after you did something specific, like lifting a heavy object or twisting your back, you might have strained a muscle  ?If your pain spreads down the back of one thigh, it could be a sign that one of the nerves that go to your leg is being pinched by a bulging or torn disc  ?If your pain goes all the way down both legs, it could be a sign that you have a narrowed spinal canal  This is most often due to bony growths on your spine  How is back pain treated? -- Most people with an episode of low back pain do not have a serious medical problem, and can try simple treatments such as:  ?Staying active - The best thing you can do is to stay as active as possible  People with low back pain recover faster if they stay active  If your pain is severe, you might need to rest for a day or 2  But it's important to get back to walking and moving as soon as possible  While you should avoid heavy lifting and sports while your back hurts, try to keep doing your normal daily activities  Exercises you can try include walking, swimming, or using an exercise bike  Some people also find that Stormy Dieter or yoga can help with their back pain  Finding activities you enjoy can help you stay active  ? Heat "- Some people find that it helps to use a heating pad or heated wrap  Be careful to avoid high heat settings to prevent skin burns  ?Medicines - First, you can try pain medicines that you can get without a prescription  In many cases, doctors suggest first trying a nonsteroidal antiinflammatory drug, or \"NSAID  \" NSAIDs include ibuprofen (sample brand names: Advil, Motrin) and naproxen (sample brand name: Aleve)  These might work better than acetaminophen (Tylenol) for back pain  If non-prescription medicines do not help, let your doctor or nurse know  In some cases, doctors prescribe a medicine to relax the muscles (called a \"muscle relaxant\")  But keep in mind that muscle relaxants are not generally used in people older than 65  In older people, these medicines can cause side effects such as trouble urinating or confusion  ? Reducing stress - Some people find that it helps to try something called \"mindfulness-based stress reduction  \" This involves going to a group program to practice relaxation and meditation  If your back pain is making you feel anxious or depressed, talk to your doctor or nurse  There are other treatments that can help with these problems  Some people wonder if injections (shots) can help to relieve back pain  In some cases, doctors might recommend a shot of medicine to numb the area or reduce swelling  But this has only been proven to work in specific situations     "

## 2023-05-18 ENCOUNTER — TELEMEDICINE (OUTPATIENT)
Dept: BEHAVIORAL/MENTAL HEALTH CLINIC | Facility: CLINIC | Age: 38
End: 2023-05-18

## 2023-05-18 ENCOUNTER — HOSPITAL ENCOUNTER (OUTPATIENT)
Dept: ULTRASOUND IMAGING | Facility: CLINIC | Age: 38
Discharge: HOME | End: 2023-05-18

## 2023-05-18 ENCOUNTER — HOSPITAL ENCOUNTER (OUTPATIENT)
Dept: MAMMOGRAPHY | Facility: CLINIC | Age: 38
Discharge: HOME | End: 2023-05-18

## 2023-05-18 VITALS — WEIGHT: 169.75 LBS | HEIGHT: 68 IN | BODY MASS INDEX: 25.73 KG/M2

## 2023-05-18 DIAGNOSIS — F41.1 GENERALIZED ANXIETY DISORDER: Primary | ICD-10-CM

## 2023-05-18 DIAGNOSIS — F32.9 MAJOR DEPRESSIVE DISORDER WITH CURRENT ACTIVE EPISODE, UNSPECIFIED DEPRESSION EPISODE SEVERITY, UNSPECIFIED WHETHER RECURRENT: ICD-10-CM

## 2023-05-18 DIAGNOSIS — R92.8 ABNORMAL MAMMOGRAM: ICD-10-CM

## 2023-05-18 NOTE — PSYCH
Virtual Regular Visit    Verification of patient location:    Patient is located at Home in the following state in which I hold an active license [place]      Assessment/Plan:    Problem List Items Addressed This Visit        Other    Anxiety disorder - Primary    Depression       Goals addressed in session: Goal 1          Reason for visit is   Chief Complaint   Patient presents with   • Virtual Regular Visit        Encounter provider Donell Louis    Provider located at [address] [place] [address] Easton Sierra  [facility] [id number] [phone]      Recent Visits  No visits were found meeting these conditions  Showing recent visits within past 7 days and meeting all other requirements  Today's Visits  Date Type Provider Dept   05/18/23 [address] today's visits and meeting all other requirements  Future Appointments  No visits were found meeting these conditions  Showing future appointments within next 150 days and meeting all other requirements       The patient was identified by name and date of birth  Alexia Suarez was informed that this is a telemedicine visit and that the visit is being conducted through the [facility] [employer]  She agrees to proceed     My office door was closed  No one else was in the room  She acknowledged consent and understanding of privacy and security of the video platform  The patient has agreed to participate and understands they can discontinue the visit at any time  Patient is aware this is a billable service           HPI     Past Medical History:   Diagnosis Date   • Anxiety    • Depression    • Eating disorder        Past Surgical History:   Procedure Laterality Date   • US BREAST NEEDLE LOC LEFT Left 5/9/2012   • WISDOM TOOTH EXTRACTION         Current Outpatient Medications   Medication Sig Dispense Refill   • albuterol (PROVENTIL HFA,VENTOLIN HFA) 90 mcg/act inhaler Inhale (Patient not taking: Reported on 5/9/2023)     • ALPRAZolam (XANAX) 0 25 mg tablet Take 1 tablet (0 25 mg total) by mouth daily at bedtime as needed for anxiety 15 tablet 0   • azelastine (ASTELIN) 0 1 % nasal spray 2 sprays into each nostril as needed       • cetirizine (ZyrTEC) 10 mg tablet Take 1 tablet (10 mg total) by mouth daily (Patient taking differently: Take 10 mg by mouth as needed) 90 tablet 3   • escitalopram (LEXAPRO) 10 mg tablet Take 1 5 tablets (15 mg total) by mouth daily 135 tablet 3   • famotidine (PEPCID) 20 mg tablet Take 1 tablet (20 mg total) by mouth daily 90 tablet 3   • hyoscyamine (LEVSIN/SL) 0 125 mg SL tablet Take 1 tablet (0 125 mg total) by mouth every 6 (six) hours as needed for cramping or diarrhea 60 tablet 5   • lidocaine (Lidoderm) 5 % Apply 1 patch topically over 12 hours daily Remove & Discard patch within 12 hours or as directed by MD 30 patch 0   • methocarbamol (ROBAXIN) 500 mg tablet Take 1 tablet (500 mg total) by mouth 4 (four) times a day 40 tablet 0   • metoprolol succinate (TOPROL-XL) 25 mg 24 hr tablet TAKE ONE TABLET BY MOUTH EVERY DAY 90 tablet 3   • multivitamin (THERAGRAN) TABS Take 1 tablet by mouth     • naproxen (Naprosyn) 500 mg tablet Take 1 tablet (500 mg total) by mouth 2 (two) times a day with meals 30 tablet 0   • omeprazole (PriLOSEC) 20 mg delayed release capsule Take 1 capsule (20 mg total) by mouth daily 90 capsule 3     No current facility-administered medications for this visit  Allergies   Allergen Reactions   • Bee Venom    • Codeine      Other reaction(s): Nausea and/or vomiting   • Oxycodone-Acetaminophen Vomiting       Review of Systems    Video Exam    There were no vitals filed for this visit  Physical Exam     Behavioral Health Psychotherapy Progress Note    Psychotherapy Provided: Individual Psychotherapy     1  Generalized anxiety disorder        2   Major depressive "disorder with current active episode, unspecified depression episode severity, unspecified whether recurrent            Goals addressed in session: Goal 1     DATA: Nicole Downs stated that things have been very busy due to her graduation and pinning ceremony this week  She stated that she was very excited to graduate and move onto the next stage of her life  She stated that she feels that she and her  have been getting along well but that he has bene somewhat irritable this week  She stated that she feels that there are several things that he is worried about  She discussed a recent situation with her mother and sister where she feels that her sister has been gaslighting her  She discussed the situation and her feelings regarding this  Discussing ways for her to set and maintain boundaries with her sister and not allow her to insert herself with her relationship with her mother  Discussing her path forward and ways to continue to build and maintain healthy relationship in her life  During this session, this clinician used the following therapeutic modalities: Cognitive Behavioral Therapy    Substance Abuse was not addressed during this session  If the client is diagnosed with a co-occurring substance use disorder, please indicate any changes in the frequency or amount of use: N/A  Stage of change for addressing substance use diagnoses: No substance use/Not applicable    ASSESSMENT:  Elaina Guadalupe presents with a Euthymic/ normal mood  her affect is Normal range and intensity, which is congruent, with her mood and the content of the session  The client has made progress on their goals  Continues to set and maintain boundaries in he relationships Elaina Guadalupe presents with a none risk of suicide, none risk of self-harm, and none risk of harm to others  For any risk assessment that surpasses a \"low\" rating, a safety plan must be developed      A safety plan was indicated: no  If yes, " describe in detail N/A    PLAN: Between sessions, Ike Cuello will continue to set and maintain boundaries in her relationship  At the next session, the therapist will use Cognitive Behavioral Therapy to address treatment goals  Behavioral Health Treatment Plan and Discharge Planning: Ike Cuello is aware of and agrees to continue to work on their treatment plan  They have identified and are working toward their discharge goals   yes    Visit start and stop times:    05/18/23  Start Time: 1102  Stop Time: 1150  Total Visit Time: 48 minutes

## 2023-05-18 NOTE — PROGRESS NOTES
Met with patient and Dr Bianca Medina                  regarding recommendation for;      __x___ RIGHT ______LEFT      __x___Ultrasound guided  ______Stereotactic  Breast biopsy  __x___Verbalized understanding        Blood thinners:  _____yes __x___no    Date stopped: ____n/a_______    Biopsy teaching sheet given:  ___x____yes ______no

## 2023-05-20 ENCOUNTER — OFFICE VISIT (OUTPATIENT)
Dept: URGENT CARE | Facility: MEDICAL CENTER | Age: 38
End: 2023-05-20

## 2023-05-20 VITALS
BODY MASS INDEX: 27.31 KG/M2 | OXYGEN SATURATION: 95 % | HEART RATE: 81 BPM | SYSTOLIC BLOOD PRESSURE: 140 MMHG | RESPIRATION RATE: 18 BRPM | DIASTOLIC BLOOD PRESSURE: 90 MMHG | HEIGHT: 68 IN | TEMPERATURE: 98 F | WEIGHT: 180.2 LBS

## 2023-05-20 DIAGNOSIS — M62.830 SPASM OF MUSCLE OF LOWER BACK: Primary | ICD-10-CM

## 2023-05-20 RX ORDER — PREDNISONE 50 MG/1
50 TABLET ORAL DAILY
Qty: 5 TABLET | Refills: 0 | Status: SHIPPED | OUTPATIENT
Start: 2023-05-20 | End: 2023-05-25

## 2023-05-20 RX ORDER — METHOCARBAMOL 500 MG/1
500 TABLET, FILM COATED ORAL
Qty: 15 TABLET | Refills: 0 | Status: SHIPPED | OUTPATIENT
Start: 2023-05-20

## 2023-05-20 NOTE — PROGRESS NOTES
[facility]        NAME: Margaret Soto is a 34 y o  female  : 1985    MRN: [id number]  DATE: May 20, 2023  TIME: 2:49 PM    Assessment and Plan   Spasm of muscle of lower back [M62 830]  1  Spasm of muscle of lower back  predniSONE 50 mg tablet    methocarbamol (ROBAXIN) 500 mg tablet    Ambulatory Referral to Physical Therapy    Ambulatory Referral to Orthopedic Surgery        Patient was told no taking xanax with a muscle relaxer  Patient Instructions       Patient was educated on low back spasm  Patient was educated on muscle relaxer  Patient was told to only take at night  No driving on muscle relaxer  Any loss of bowel or bladder control go to ED  Steroids were prescribed  Patient was educated side effects  Patient was told to not take OTC anti-inflammatories while on steroids  Formal PT and ortho referral given  Chief Complaint     Chief Complaint   Patient presents with   • Back Pain     Pt here last week for severe back spasm and pain  Yesterday dog jumped up on her yesterday and she felt the same back spasm happen  Pt states she is not in pain just extremely uncomfortable and limited in her mobility  History of Present Illness       Patient is here today with low back spasm  Patient was seen 1 5 weeks ago and was prescribed Robaxin and Naprosyn  Patient reports earlier today the dog jumped on her and caused back pain  Patient reports an extensive low back history  Patient reports no numbness or tingling down legs  Denies any loss of bowel of bladder control  Admits allergy to pain medications  Review of Systems   Review of Systems   Constitutional: Negative  Respiratory: Negative  Cardiovascular: Negative  Musculoskeletal: Positive for back pain  Psychiatric/Behavioral: Negative            Current Medications       Current Outpatient Medications:   •  ALPRAZolam (XANAX) 0 25 mg tablet, Take 1 tablet (0 25 mg total) by mouth daily at bedtime as needed for anxiety, Disp: 15 tablet, Rfl: 0  •  azelastine (ASTELIN) 0 1 % nasal spray, 2 sprays into each nostril as needed  , Disp: , Rfl:   •  famotidine (PEPCID) 20 mg tablet, Take 1 tablet (20 mg total) by mouth daily, Disp: 90 tablet, Rfl: 3  •  methocarbamol (ROBAXIN) 500 mg tablet, Take 1 tablet (500 mg total) by mouth daily at bedtime as needed for muscle spasms, Disp: 15 tablet, Rfl: 0  •  metoprolol succinate (TOPROL-XL) 25 mg 24 hr tablet, TAKE ONE TABLET BY MOUTH EVERY DAY, Disp: 90 tablet, Rfl: 3  •  multivitamin (THERAGRAN) TABS, Take 1 tablet by mouth, Disp: , Rfl:   •  omeprazole (PriLOSEC) 20 mg delayed release capsule, Take 1 capsule (20 mg total) by mouth daily, Disp: 90 capsule, Rfl: 3  •  predniSONE 50 mg tablet, Take 1 tablet (50 mg total) by mouth daily for 5 days, Disp: 5 tablet, Rfl: 0  •  albuterol (PROVENTIL HFA,VENTOLIN HFA) 90 mcg/act inhaler, Inhale (Patient not taking: Reported on 5/9/2023), Disp: , Rfl:   •  cetirizine (ZyrTEC) 10 mg tablet, Take 1 tablet (10 mg total) by mouth daily (Patient taking differently: Take 10 mg by mouth as needed), Disp: 90 tablet, Rfl: 3  •  escitalopram (LEXAPRO) 10 mg tablet, Take 1 5 tablets (15 mg total) by mouth daily, Disp: 135 tablet, Rfl: 3  •  hyoscyamine (LEVSIN/SL) 0 125 mg SL tablet, Take 1 tablet (0 125 mg total) by mouth every 6 (six) hours as needed for cramping or diarrhea, Disp: 60 tablet, Rfl: 5  •  lidocaine (Lidoderm) 5 %, Apply 1 patch topically over 12 hours daily Remove & Discard patch within 12 hours or as directed by MD (Patient not taking: Reported on 5/20/2023), Disp: 30 patch, Rfl: 0  •  methocarbamol (ROBAXIN) 500 mg tablet, Take 1 tablet (500 mg total) by mouth 4 (four) times a day (Patient not taking: Reported on 5/20/2023), Disp: 40 tablet, Rfl: 0  •  naproxen (Naprosyn) 500 mg tablet, Take 1 tablet (500 mg total) by mouth 2 (two) times a day with meals (Patient not taking: Reported on 5/20/2023), Disp: 30 tablet, Rfl: "0    Current Allergies     Allergies as of 05/20/2023 - Reviewed 05/20/2023   Allergen Reaction Noted   • Bee venom  02/06/2018   • Codeine  07/10/2017   • Oxycodone-acetaminophen Vomiting 03/25/2016            The following portions of the patient's history were reviewed and updated as appropriate: allergies, current medications, past family history, past medical history, past social history, past surgical history and problem list      Past Medical History:   Diagnosis Date   • Anxiety    • Depression    • Eating disorder        Past Surgical History:   Procedure Laterality Date   • US BREAST NEEDLE LOC LEFT Left 5/9/2012   • WISDOM TOOTH EXTRACTION         Family History   Problem Relation Age of Onset   • Irregular heart beat Mother         Proxysmal Atrial Tachycardia, PVC   • Asthma Father    • Hyperlipidemia Father    • Hypertension Father    • Anxiety disorder Father    • No Known Problems Sister    • Uterine cancer Maternal Grandmother 61   • Prostate cancer Maternal Grandfather         61   • No Known Problems Paternal Grandmother    • Emphysema Paternal Grandfather    • Ovarian cancer Maternal Aunt 66   • Breast cancer Maternal Aunt 79         Medications have been verified  Objective   /90 (BP Location: Left arm)   Pulse 81   Temp 98 °F (36 7 °C) (Tympanic)   Resp 18   Ht 5' 8\" (1 727 m)   Wt 81 7 kg (180 lb 3 2 oz)   LMP 05/04/2023   SpO2 95%   BMI 27 40 kg/m²   Patient's last menstrual period was 05/04/2023  Physical Exam     Physical Exam  Vitals reviewed  Constitutional:       Appearance: Normal appearance  HENT:      Head: Normocephalic  Cardiovascular:      Rate and Rhythm: Normal rate and regular rhythm  Heart sounds: Normal heart sounds  Pulmonary:      Breath sounds: Normal breath sounds  Musculoskeletal:      Comments: Pain over right lumbar para-spinals  No pain over left lumbar para-spinals  NO pain over lumbar spine  Lower body motor intact   Tight " hamstring noted in B/L legs   Neurological:      General: No focal deficit present  Mental Status: She is alert and oriented to person, place, and time     Psychiatric:         Mood and Affect: Mood normal          Behavior: Behavior normal

## 2023-05-20 NOTE — PATIENT INSTRUCTIONS
Patient was educated on low back spasm  Patient was educated on muscle relaxer  Patient was told to only take at night  No driving on muscle relaxer  Any loss of bowel or bladder control go to ED  Steroids were prescribed  Patient was educated side effects  Patient was told to not take OTC anti-inflammatories while on steroids  Formal PT and ortho referral given  Muscle Spasm   WHAT YOU NEED TO KNOW:   A muscle spasm is a sudden contraction of any muscle or group of muscles  A muscle cramp is a painful muscle spasm  Muscle cramps commonly occur after intense exercise or during pregnancy  They may also be caused by certain medications, dehydration, low calcium or magnesium levels, or another medical condition  DISCHARGE INSTRUCTIONS:   Medicines: You may need the following:  NSAIDs  help decrease swelling and pain or fever  This medicine is available with or without a doctor's order  NSAIDs can cause stomach bleeding or kidney problems in certain people  If you take blood thinner medicine, always ask your healthcare provider if NSAIDs are safe for you  Always read the medicine label and follow directions  Take your medicine as directed  Contact your healthcare provider if you think your medicine is not helping or if you have side effects  Tell your provider if you are allergic to any medicine  Keep a list of the medicines, vitamins, and herbs you take  Include the amounts, and when and why you take them  Bring the list or the pill bottles to follow-up visits  Carry your medicine list with you in case of an emergency  Follow up with your healthcare provider as directed: You may need other tests or treatment  You may also be referred to a physical therapist or other specialist  Write down your questions so you remember to ask them during your visits  Self-care:   Stretch  your muscle to help relieve the cramp   It may be helpful to keep your muscle in the stretched position until the cramp is gone      Apply heat  to help decrease pain and muscle spasms  Apply heat on the area for 20 to 30 minutes every 2 hours for as many days as directed  Apply ice  to help decrease swelling and pain  Ice may also help prevent tissue damage  Use an ice pack, or put crushed ice in a plastic bag  Cover it with a towel and place it on your muscle for 15 to 20 minutes every hour or as directed  Drink more liquids  to help prevent muscle cramps caused by dehydration  Sports drinks may help replace electrolytes you lose through sweat during exercise  Ask your healthcare provider how much liquid to drink each day and which liquids are best for you  Eat healthy foods , such as fruits, vegetables, whole grains, low-fat dairy products, and lean proteins (meat, beans, and fish)  If you are pregnant, ask your healthcare provider about foods that are high in magnesium and sodium  They may help to relieve cramps during pregnancy  Massage your muscle  to help relieve the cramp  Take frequent deep breaths  until the cramp feels better  Lie down while you take the deep breaths so you do not get dizzy or lightheaded  Contact your healthcare provider if:   You have signs of dehydration, such as a headache, dark yellow urine, dry eyes or mouth, or a fast heartbeat  You have questions or concerns about your condition or care  Return to the emergency department if:   You have warmth, swelling, or redness in the cramping muscle  You have frequent or unrelieved muscle cramps in several different muscles  You have muscle cramps with numbness, tingling, and burning in your hands and feet  © Copyright [employer] 2022 Information is for End User's use only and may not be sold, redistributed or otherwise used for commercial purposes  The above information is an  only  It is not intended as medical advice for individual conditions or treatments   Talk to your doctor, nurse or pharmacist before following any medical regimen to see if it is safe and effective for you

## 2023-06-06 ENCOUNTER — HOSPITAL ENCOUNTER (OUTPATIENT)
Dept: ULTRASOUND IMAGING | Facility: CLINIC | Age: 38
Discharge: HOME | End: 2023-06-06
Payer: COMMERCIAL

## 2023-06-06 ENCOUNTER — HOSPITAL ENCOUNTER (OUTPATIENT)
Dept: MAMMOGRAPHY | Facility: CLINIC | Age: 38
Discharge: HOME | End: 2023-06-06
Payer: COMMERCIAL

## 2023-06-06 VITALS — HEART RATE: 69 BPM | SYSTOLIC BLOOD PRESSURE: 156 MMHG | DIASTOLIC BLOOD PRESSURE: 97 MMHG

## 2023-06-06 DIAGNOSIS — R92.8 ABNORMAL MAMMOGRAM: ICD-10-CM

## 2023-06-06 DIAGNOSIS — R92.8 ABNORMAL ULTRASOUND OF BREAST: ICD-10-CM

## 2023-06-06 PROCEDURE — 19083 BX BREAST 1ST LESION US IMAG: CPT

## 2023-06-06 PROCEDURE — 88341 IMHCHEM/IMCYTCHM EA ADD ANTB: CPT | Performed by: PATHOLOGY

## 2023-06-06 PROCEDURE — A4648 IMPLANTABLE TISSUE MARKER: HCPCS

## 2023-06-06 PROCEDURE — 88342 IMHCHEM/IMCYTCHM 1ST ANTB: CPT | Performed by: PATHOLOGY

## 2023-06-06 PROCEDURE — 88305 TISSUE EXAM BY PATHOLOGIST: CPT | Performed by: PATHOLOGY

## 2023-06-06 RX ORDER — LIDOCAINE HYDROCHLORIDE 10 MG/ML
5 INJECTION, SOLUTION EPIDURAL; INFILTRATION; INTRACAUDAL; PERINEURAL ONCE
Status: COMPLETED | OUTPATIENT
Start: 2023-06-06 | End: 2023-06-06

## 2023-06-06 RX ADMIN — LIDOCAINE HYDROCHLORIDE 5 ML: 10 INJECTION, SOLUTION EPIDURAL; INFILTRATION; INTRACAUDAL; PERINEURAL at 13:00

## 2023-06-09 ENCOUNTER — TELEPHONE (OUTPATIENT)
Dept: MAMMOGRAPHY | Facility: CLINIC | Age: 38
End: 2023-06-09

## 2023-06-09 PROCEDURE — 88305 TISSUE EXAM BY PATHOLOGIST: CPT | Performed by: PATHOLOGY

## 2023-06-09 PROCEDURE — 88342 IMHCHEM/IMCYTCHM 1ST ANTB: CPT | Performed by: PATHOLOGY

## 2023-06-09 PROCEDURE — 88341 IMHCHEM/IMCYTCHM EA ADD ANTB: CPT | Performed by: PATHOLOGY

## 2023-06-09 NOTE — TELEPHONE ENCOUNTER
[id number] [facility] Surgical Oncology Referral    Diagnosis:Radial scar    Is this diagnosis cancer (Y/N):no    Biopsy Date: 6/6/2023    Does the patient have another biopsy pending:no  If so, when:    Preferred provider:Dr Monique Woody    Preferred location: [facility] office    Any requests for dates/times:     Any additional information:     Please advise when appointment is made yes

## 2023-06-09 NOTE — RESULT ENCOUNTER NOTE
Please let the patient know that her breast biopsy was negative for cancer but does show complex scarring  She may already know the results from breast health  Would recommend that she see her GYN for further discussion on results

## 2023-06-12 ENCOUNTER — HOSPITAL ENCOUNTER (OUTPATIENT)
Dept: ULTRASOUND IMAGING | Facility: CLINIC | Age: 38
Discharge: HOME | End: 2023-06-12

## 2023-06-28 ENCOUNTER — OCCMED (OUTPATIENT)
Dept: URGENT CARE | Facility: CLINIC | Age: 38
End: 2023-06-28

## 2023-06-28 ENCOUNTER — TELEPHONE (OUTPATIENT)
Dept: FAMILY MEDICINE CLINIC | Facility: CLINIC | Age: 38
End: 2023-06-28

## 2023-06-28 ENCOUNTER — APPOINTMENT (OUTPATIENT)
Dept: LAB | Facility: CLINIC | Age: 38
End: 2023-06-28

## 2023-06-28 DIAGNOSIS — Z02.1 ENCOUNTER FOR PRE-EMPLOYMENT HEALTH SCREENING EXAMINATION: ICD-10-CM

## 2023-06-28 DIAGNOSIS — Z02.1 ENCOUNTER FOR PRE-EMPLOYMENT HEALTH SCREENING EXAMINATION: Primary | ICD-10-CM

## 2023-06-28 LAB
MEV IGG SER QL IA: ABNORMAL
MUV IGG SER QL IA: NORMAL
RUBV IGG SERPL IA-ACNC: 20.5 IU/ML
VZV IGG SER QL IA: NORMAL

## 2023-06-28 PROCEDURE — 86787 VARICELLA-ZOSTER ANTIBODY: CPT

## 2023-06-28 PROCEDURE — 36415 COLL VENOUS BLD VENIPUNCTURE: CPT

## 2023-06-28 PROCEDURE — 86762 RUBELLA ANTIBODY: CPT

## 2023-06-28 PROCEDURE — 86480 TB TEST CELL IMMUN MEASURE: CPT

## 2023-06-28 PROCEDURE — 86765 RUBEOLA ANTIBODY: CPT

## 2023-06-28 PROCEDURE — 86735 MUMPS ANTIBODY: CPT

## 2023-06-28 NOTE — TELEPHONE ENCOUNTER
Patient came into the office and dropped off a physical form for her job  Forms will be placed in eduarda folder

## 2023-06-29 ENCOUNTER — TELEMEDICINE (OUTPATIENT)
Dept: BEHAVIORAL/MENTAL HEALTH CLINIC | Facility: CLINIC | Age: 38
End: 2023-06-29
Payer: COMMERCIAL

## 2023-06-29 DIAGNOSIS — F41.1 GENERALIZED ANXIETY DISORDER: Primary | ICD-10-CM

## 2023-06-29 DIAGNOSIS — F32.9 MAJOR DEPRESSIVE DISORDER WITH CURRENT ACTIVE EPISODE, UNSPECIFIED DEPRESSION EPISODE SEVERITY, UNSPECIFIED WHETHER RECURRENT: ICD-10-CM

## 2023-06-29 NOTE — PSYCH
Virtual Regular Visit    Verification of patient location:    Patient is located at Home in the following state in which I hold an active license [place]      Assessment/Plan:    Problem List Items Addressed This Visit        Other    Anxiety disorder - Primary    Depression       Goals addressed in session: Goal 1          Reason for visit is   Chief Complaint   Patient presents with   • Virtual Regular Visit        Encounter provider Alejandro Hernandez    Provider located at [address] [place] [id number]  [phone]      Recent Visits  No visits were found meeting these conditions  Showing recent visits within past 7 days and meeting all other requirements  Today's Visits  Date Type Provider Dept   06/29/23 [address] today's visits and meeting all other requirements  Future Appointments  No visits were found meeting these conditions  Showing future appointments within next 150 days and meeting all other requirements       The patient was identified by name and date of birth  Danisha Clive was informed that this is a telemedicine visit and that the visit is being conducted through the [employer]  She agrees to proceed     My office door was closed  No one else was in the room  She acknowledged consent and understanding of privacy and security of the video platform  The patient has agreed to participate and understands they can discontinue the visit at any time  Patient is aware this is a billable service           HPI     Past Medical History:   Diagnosis Date   • Anxiety    • Depression    • Eating disorder        Past Surgical History:   Procedure Laterality Date   • US BREAST NEEDLE LOC LEFT Left 5/9/2012   • US GUIDED BREAST BIOPSY RIGHT COMPLETE Right 6/6/2023   • WISDOM TOOTH EXTRACTION         Current Outpatient Medications   Medication Sig Dispense Refill   • albuterol (PROVENTIL HFA,VENTOLIN HFA) 90 mcg/act inhaler Inhale (Patient not taking: Reported on 5/9/2023)     • ALPRAZolam (XANAX) 0 25 mg tablet Take 1 tablet (0 25 mg total) by mouth daily at bedtime as needed for anxiety 15 tablet 0   • azelastine (ASTELIN) 0 1 % nasal spray 2 sprays into each nostril as needed       • cetirizine (ZyrTEC) 10 mg tablet Take 1 tablet (10 mg total) by mouth daily (Patient taking differently: Take 10 mg by mouth as needed) 90 tablet 3   • escitalopram (LEXAPRO) 10 mg tablet Take 1 5 tablets (15 mg total) by mouth daily 135 tablet 3   • famotidine (PEPCID) 20 mg tablet Take 1 tablet (20 mg total) by mouth daily 90 tablet 3   • hyoscyamine (LEVSIN/SL) 0 125 mg SL tablet Take 1 tablet (0 125 mg total) by mouth every 6 (six) hours as needed for cramping or diarrhea 60 tablet 5   • lidocaine (Lidoderm) 5 % Apply 1 patch topically over 12 hours daily Remove & Discard patch within 12 hours or as directed by MD (Patient not taking: Reported on 5/20/2023) 30 patch 0   • methocarbamol (ROBAXIN) 500 mg tablet Take 1 tablet (500 mg total) by mouth 4 (four) times a day (Patient not taking: Reported on 5/20/2023) 40 tablet 0   • methocarbamol (ROBAXIN) 500 mg tablet Take 1 tablet (500 mg total) by mouth daily at bedtime as needed for muscle spasms 15 tablet 0   • metoprolol succinate (TOPROL-XL) 25 mg 24 hr tablet TAKE ONE TABLET BY MOUTH EVERY DAY 90 tablet 3   • multivitamin (THERAGRAN) TABS Take 1 tablet by mouth     • naproxen (Naprosyn) 500 mg tablet Take 1 tablet (500 mg total) by mouth 2 (two) times a day with meals (Patient not taking: Reported on 5/20/2023) 30 tablet 0   • omeprazole (PriLOSEC) 20 mg delayed release capsule Take 1 capsule (20 mg total) by mouth daily 90 capsule 3     No current facility-administered medications for this visit          Allergies   Allergen Reactions   • Bee Venom    • Codeine      Other reaction(s): Nausea and/or vomiting   • Oxycodone-Acetaminophen Vomiting       Review of Systems    Video Exam    There were no vitals filed for this visit  Physical Exam     Behavioral Health Psychotherapy Progress Note    Psychotherapy Provided: Individual Psychotherapy     1  Generalized anxiety disorder        2  Major depressive disorder with current active episode, unspecified depression episode severity, unspecified whether recurrent            Goals addressed in session: Goal 1     DATA: Anibal Query stated that the last few weeks have been difficult  She stated that they enjoyed their trip to [facility] however, it was somewhat difficult because her in laws acted in a bigoted manner  She stated that the results of her biopsy were not good and that whole the mass is not cancerous it can become so and she must have surgery  In addition to this, she did not pass the first assessment with the score required by her school and needs to retake the assessment before registering for her exam  Processing her emotions and discussing the possibility that she will not be able to start her job on the date initially planned  Discussing self care and navigating the stress to be able to move towards he future, Giving supportive therapy  During this session, this clinician used the following therapeutic modalities: Cognitive Behavioral Therapy and Supportive Psychotherapy    Substance Abuse was not addressed during this session  If the client is diagnosed with a co-occurring substance use disorder, please indicate any changes in the frequency or amount of use: N/A  Stage of change for addressing substance use diagnoses: No substance use/Not applicable    ASSESSMENT:  Jomar Condon presents with a Euthymic/ normal mood  her affect is Normal range and intensity, which is congruent, with her mood and the content of the session  The client has made progress on their goals      Continues to work on navigating her stress in a " Healthy manner Stan Hoskins presents with a none risk of suicide, none risk of self-harm, and none risk of harm to others  For any risk assessment that surpasses a \"low\" rating, a safety plan must be developed  A safety plan was indicated: no  If yes, describe in detail N/A    PLAN: Between sessions, Stan Hoskins will continue to navigate her stress in healthy manner  At the next session, the therapist will use Cognitive Behavioral Therapy and Supportive Psychotherapy to address treatment goals  Behavioral Health Treatment Plan and Discharge Planning: Stan Hoskins is aware of and agrees to continue to work on their treatment plan  They have identified and are working toward their discharge goals   yes    Visit start and stop times:    06/29/23  Start Time: 1104  Stop Time: 1144  Total Visit Time: 40 minutes  "

## 2023-06-30 LAB
GAMMA INTERFERON BACKGROUND BLD IA-ACNC: 0.05 IU/ML
M TB IFN-G BLD-IMP: NEGATIVE
M TB IFN-G CD4+ BCKGRND COR BLD-ACNC: -0.01 IU/ML
M TB IFN-G CD4+ BCKGRND COR BLD-ACNC: -0.01 IU/ML
MITOGEN IGNF BCKGRD COR BLD-ACNC: >10 IU/ML

## 2023-07-03 ENCOUNTER — CONSULT (OUTPATIENT)
Dept: SURGICAL ONCOLOGY | Facility: CLINIC | Age: 38
End: 2023-07-03
Payer: COMMERCIAL

## 2023-07-03 VITALS
SYSTOLIC BLOOD PRESSURE: 136 MMHG | OXYGEN SATURATION: 98 % | WEIGHT: 175 LBS | RESPIRATION RATE: 16 BRPM | BODY MASS INDEX: 26.52 KG/M2 | DIASTOLIC BLOOD PRESSURE: 80 MMHG | TEMPERATURE: 97.7 F | HEIGHT: 68 IN | HEART RATE: 66 BPM

## 2023-07-03 DIAGNOSIS — N64.89 RADIAL SCAR OF RIGHT BREAST: Primary | ICD-10-CM

## 2023-07-03 DIAGNOSIS — R92.8 ABNORMAL FINDING ON BREAST IMAGING: ICD-10-CM

## 2023-07-03 DIAGNOSIS — Z13.71 BRCA NEGATIVE: ICD-10-CM

## 2023-07-03 DIAGNOSIS — R92.2 DENSE BREAST TISSUE: ICD-10-CM

## 2023-07-03 PROBLEM — R92.30 DENSE BREAST TISSUE: Status: ACTIVE | Noted: 2023-07-03

## 2023-07-03 PROCEDURE — 99244 OFF/OP CNSLTJ NEW/EST MOD 40: CPT | Performed by: SURGERY

## 2023-07-03 RX ORDER — CEFAZOLIN SODIUM 1 G/50ML
1000 SOLUTION INTRAVENOUS
OUTPATIENT
Start: 2023-07-03

## 2023-07-03 NOTE — PROGRESS NOTES
Breast Consultation-Surgical Oncology     [facility]  [facility]  [address] [facility] [address] [address]    Name:  Azeb Sanchez  YOB: 1985  MRN:  [id number]    Assessment/Plan   Diagnoses and all orders for this visit:    Radial scar of right breast    Abnormal finding on breast imaging    BRCA negative    Other orders  -     Reason for no Pharmacologic VTE Prophylaxis; Standing  -     Apply SCD or Foot pumps; Standing  -     ceFAZolin (ANCEF) IVPB (premix in dextrose) 1,000 mg 50 mL          HPI: Azeb Sanchez is a 34y.o. year old female who presents with abnormal imaging of the right breast.  She had an abnormal screening mammogram which led to diagnostic imaging and a biopsy. There was a second area on the right side which was thought to be benign for which short-term follow-up was recommended. She does report family history of both breast and ovarian cancer. She had a panel test through [facility] showing no genetic mutations. Surgical treatment to date consisted of not applicable. Oncology History:    Oncology History    No history exists.        Pertinent reproductive history:  Age at menarche:    OB History        0    Para   0    Term   0       0    AB   0    Living   0       SAB   0    IAB   0    Ectopic   0    Multiple   0    Live Births   0           Obstetric Comments   Age at menarche 13               Problem List:   Patient Active Problem List   Diagnosis   • Allergic rhinitis   • Anxiety disorder   • Asthma   • Fatigue   • Heart palpitations   • Low back pain   • GERD (gastroesophageal reflux disease)   • Depression   • Family history of ovarian cancer   • Irritable bowel syndrome with diarrhea   • Hyperglycemia   • History of COVID-19   • Radial scar of right breast   • BRCA negative     Past Medical History:   Diagnosis Date   • Anxiety    • Back pain     history of sports injuries   • Depression    • Eating disorder      Past Surgical History:   Procedure Laterality Date   • BREAST BIOPSY Right 06/06/2023   • US BREAST NEEDLE LOC LEFT Left 05/09/2012   • US GUIDED BREAST BIOPSY RIGHT COMPLETE Right 06/06/2023   • WISDOM TOOTH EXTRACTION       Family History   Problem Relation Age of Onset   • Irregular heart beat Mother         Proxysmal Atrial Tachycardia, PVC   • Asthma Father    • Hyperlipidemia Father    • Hypertension Father    • Anxiety disorder Father    • No Known Problems Sister    • Ovarian cancer Maternal Aunt 79   • Breast cancer Maternal Aunt 69        stage 4 metastatic breast cancer   • Uterine cancer Maternal Grandmother 68   • Prostate cancer Maternal Grandfather 67   • No Known Problems Paternal Grandmother    • Emphysema Paternal Grandfather      Social History     Socioeconomic History   • Marital status: /Civil Union     Spouse name: Not on file   • Number of children: Not on file   • Years of education: Not on file   • Highest education level: Not on file   Occupational History   • Not on file   Tobacco Use   • Smoking status: Former   • Smokeless tobacco: Never   Vaping Use   • Vaping Use: Never used   Substance and Sexual Activity   • Alcohol use: Yes     Comment: 3-4 / day - wine    • Drug use: No   • Sexual activity: Yes     Partners: Male     Birth control/protection: None   Other Topics Concern   • Not on file   Social History Narrative   • Not on file     Social Determinants of Health     Financial Resource Strain: Not on file   Food Insecurity: Not on file   Transportation Needs: Not on file   Physical Activity: Not on file   Stress: Not on file   Social Connections: Not on file   Intimate Partner Violence: Not on file   Housing Stability: Not on file     Current Outpatient Medications   Medication Sig Dispense Refill   • albuterol (PROVENTIL HFA,VENTOLIN HFA) 90 mcg/act inhaler Inhale     • ALPRAZolam (XANAX) 0.25 mg tablet Take 1 tablet (0.25 mg total) by mouth daily at bedtime as needed for anxiety 15 tablet 0   • azelastine (ASTELIN) 0.1 % nasal spray 2 sprays into each nostril as needed       • cetirizine (ZyrTEC) 10 mg tablet Take 1 tablet (10 mg total) by mouth daily (Patient taking differently: Take 10 mg by mouth as needed) 90 tablet 3   • escitalopram (LEXAPRO) 10 mg tablet Take 1.5 tablets (15 mg total) by mouth daily 135 tablet 3   • famotidine (PEPCID) 20 mg tablet Take 1 tablet (20 mg total) by mouth daily 90 tablet 3   • hyoscyamine (LEVSIN/SL) 0.125 mg SL tablet Take 1 tablet (0.125 mg total) by mouth every 6 (six) hours as needed for cramping or diarrhea 60 tablet 5   • metoprolol succinate (TOPROL-XL) 25 mg 24 hr tablet TAKE ONE TABLET BY MOUTH EVERY DAY 90 tablet 3   • multivitamin (THERAGRAN) TABS Take 1 tablet by mouth     • omeprazole (PriLOSEC) 20 mg delayed release capsule Take 1 capsule (20 mg total) by mouth daily 90 capsule 3   • lidocaine (Lidoderm) 5 % Apply 1 patch topically over 12 hours daily Remove & Discard patch within 12 hours or as directed by MD (Patient not taking: Reported on 5/20/2023) 30 patch 0   • methocarbamol (ROBAXIN) 500 mg tablet Take 1 tablet (500 mg total) by mouth 4 (four) times a day (Patient not taking: Reported on 7/3/2023) 40 tablet 0   • methocarbamol (ROBAXIN) 500 mg tablet Take 1 tablet (500 mg total) by mouth daily at bedtime as needed for muscle spasms (Patient not taking: Reported on 7/3/2023) 15 tablet 0   • naproxen (Naprosyn) 500 mg tablet Take 1 tablet (500 mg total) by mouth 2 (two) times a day with meals (Patient not taking: Reported on 5/20/2023) 30 tablet 0     No current facility-administered medications for this visit.      Allergies   Allergen Reactions   • Bee Venom Rash     Cellulitis   • Codeine      Other reaction(s): Nausea and/or vomiting   • Oxycodone-Acetaminophen Vomiting         The following portions of the patient's history were reviewed and updated as appropriate: allergies, current medications, past family history, past medical history, past social history, past surgical history and problem list.    Review of Systems:  Review of Systems   Constitutional: Negative. Negative for appetite change and fever. HENT: Positive for tinnitus. Eyes: Negative. Respiratory: Negative for shortness of breath. Cardiovascular: Negative. Gastrointestinal: Negative. Endocrine: Negative. Genitourinary: Negative. Musculoskeletal: Positive for back pain. Negative for arthralgias and myalgias. Skin: Negative. Allergic/Immunologic: Positive for environmental allergies. Neurological: Negative. Hematological: Negative. Negative for adenopathy. Does not bruise/bleed easily. Psychiatric/Behavioral: Negative. Physical Exam:  Physical Exam  Constitutional:       General: She is not in acute distress. Appearance: Normal appearance. She is well-developed. HENT:      Head: Normocephalic and atraumatic. Cardiovascular:      Heart sounds: Normal heart sounds. Pulmonary:      Breath sounds: Normal breath sounds. Chest:   Breasts:     Right: Skin change ( Resolving ecchymosis with nodularity) present. No inverted nipple, mass, nipple discharge or tenderness. Left: No inverted nipple, mass, nipple discharge, skin change or tenderness. Abdominal:      Palpations: Abdomen is soft. Musculoskeletal:      Right lower leg: No edema. Left lower leg: No edema. Lymphadenopathy:      Upper Body:      Right upper body: No supraclavicular, axillary or pectoral adenopathy. Left upper body: No supraclavicular, axillary or pectoral adenopathy. Neurological:      Mental Status: She is alert and oriented to person, place, and time.    Psychiatric:         Mood and Affect: Mood normal.         Laboratory:  6/6/2023 core biopsy right breast 3:00    Pathology revealed: Radial sclerosing lesion      Other studies: Genetic testing was reviewed in care everywhere from [facility] which was negative in 2016    Imagin/3/2023 bilateral 3D screening mammogram showed no concerns on the left side, oval mass lower inner right as well as an asymmetry in the inner right for which additional imaging was recommended, density is a 3      2023 right 3D diagnostic mammogram and ultrasound again shows dense breast tissue, 8 mm mass at the 5:00 axis of the right breast thought to be benign, second lesion at the right breast 3:00 irregular in nature with a sonographic correlate measuring 18 mm for which biopsy was recommended as noted above, normal-appearing axillary nodes    2023 postbiopsy mammogram is concordant, heart shaped clip in place        Discussion/Summary: 68-year-old female who presents today secondary to abnormal imaging of the right breast.  She had a biopsy of the 3:00 lesion which showed a radial scar. The second area at 5:00 requires short-term follow-up. The patient does have family history of both breast and ovarian cancer. Her genetic testing done in 2016 was negative. This was a panel test.  I reviewed these findings with her. I am recommending a TONYA localized lumpectomy of the right breast.  She has a standard clip in place and will need the [facility]. All of her questions were answered. Consent was signed today in the office. She will be scheduled for surgery in the near term.

## 2023-07-10 ENCOUNTER — TELEPHONE (OUTPATIENT)
Dept: SURGICAL ONCOLOGY | Facility: CLINIC | Age: 38
End: 2023-07-10

## 2023-07-10 NOTE — TELEPHONE ENCOUNTER
Called and left a message to return my call to discuss scheduling her surgery with Dr Lidya Lyn. Direct number provided.

## 2023-07-14 ENCOUNTER — HOSPITAL ENCOUNTER (OUTPATIENT)
Dept: ULTRASOUND IMAGING | Facility: CLINIC | Age: 38
Discharge: HOME | End: 2023-07-14
Payer: COMMERCIAL

## 2023-07-14 ENCOUNTER — HOSPITAL ENCOUNTER (OUTPATIENT)
Dept: MAMMOGRAPHY | Facility: CLINIC | Age: 38
Discharge: HOME | End: 2023-07-14
Payer: COMMERCIAL

## 2023-07-14 VITALS — SYSTOLIC BLOOD PRESSURE: 115 MMHG | HEART RATE: 69 BPM | DIASTOLIC BLOOD PRESSURE: 85 MMHG

## 2023-07-14 DIAGNOSIS — N64.89 RADIAL SCAR OF RIGHT BREAST: ICD-10-CM

## 2023-07-14 DIAGNOSIS — R92.8 ABNORMAL MAMMOGRAM: ICD-10-CM

## 2023-07-14 DIAGNOSIS — R92.8 ABNORMAL FINDING ON BREAST IMAGING: ICD-10-CM

## 2023-07-14 PROCEDURE — A4648 IMPLANTABLE TISSUE MARKER: HCPCS

## 2023-07-14 PROCEDURE — 19285 PERQ DEV BREAST 1ST US IMAG: CPT

## 2023-07-14 RX ORDER — LIDOCAINE HYDROCHLORIDE 10 MG/ML
5 INJECTION, SOLUTION EPIDURAL; INFILTRATION; INTRACAUDAL; PERINEURAL ONCE
Status: COMPLETED | OUTPATIENT
Start: 2023-07-14 | End: 2023-07-14

## 2023-07-14 RX ADMIN — LIDOCAINE HYDROCHLORIDE 5 ML: 10 INJECTION, SOLUTION EPIDURAL; INFILTRATION; INTRACAUDAL; PERINEURAL at 14:28

## 2023-07-14 NOTE — PROGRESS NOTES
Procedure type:    __x___ultrasound guided _____stereotactic    Breast:    _____Left __x___Right    Location: 3 o'clock  2cmfn    Needle: 16G    # of passes: 1    Clip: 7.5cm Rosana  Reflector    Performed by: Dr. Hank Alegria held for 5 minutes by: Donato Sellers Strips:    _____yes __x___no    Band aid:    __X___yes_____no    Tolerated procedure:    __X___yes _____no

## 2023-07-17 NOTE — PROGRESS NOTES
Post procedure call completed 07/17/23 @ 0943      Bleeding: _____yes __X___no    Pain: _____yes ___X___no    Redness/Swelling: ______yes ___X___no    Band aid removed: _____yes ___X__no (discussed removing when she showers)    Steri-Strips intact: ___X___yes _____no (discussed with patient to remove steri strips on . .. if they have not come off on their own)    Pt with no questions at this time, adv will call when results available, adv to call with any questions or concerns, has name/# for contact

## 2023-07-21 ENCOUNTER — APPOINTMENT (OUTPATIENT)
Dept: LAB | Facility: CLINIC | Age: 38
End: 2023-07-21
Payer: COMMERCIAL

## 2023-07-21 DIAGNOSIS — R92.8 ABNORMAL FINDING ON BREAST IMAGING: ICD-10-CM

## 2023-07-21 DIAGNOSIS — N64.89 RADIAL SCAR OF RIGHT BREAST: ICD-10-CM

## 2023-07-21 LAB
ALBUMIN SERPL BCP-MCNC: 3.9 G/DL (ref 3.5–5)
ALP SERPL-CCNC: 77 U/L (ref 46–116)
ALT SERPL W P-5'-P-CCNC: 57 U/L (ref 12–78)
ANION GAP SERPL CALCULATED.3IONS-SCNC: 10 MMOL/L
AST SERPL W P-5'-P-CCNC: 56 U/L (ref 5–45)
BASOPHILS # BLD AUTO: 0.04 THOUSANDS/ÂΜL (ref 0–0.1)
BASOPHILS NFR BLD AUTO: 1 % (ref 0–1)
BILIRUB SERPL-MCNC: 0.41 MG/DL (ref 0.2–1)
BUN SERPL-MCNC: 12 MG/DL (ref 5–25)
CALCIUM SERPL-MCNC: 9.1 MG/DL (ref 8.3–10.1)
CHLORIDE SERPL-SCNC: 109 MMOL/L (ref 96–108)
CO2 SERPL-SCNC: 20 MMOL/L (ref 21–32)
CREAT SERPL-MCNC: 0.67 MG/DL (ref 0.6–1.3)
EOSINOPHIL # BLD AUTO: 0.19 THOUSAND/ÂΜL (ref 0–0.61)
EOSINOPHIL NFR BLD AUTO: 4 % (ref 0–6)
ERYTHROCYTE [DISTWIDTH] IN BLOOD BY AUTOMATED COUNT: 12.4 % (ref 11.6–15.1)
GFR SERPL CREATININE-BSD FRML MDRD: 110 ML/MIN/1.73SQ M
GLUCOSE P FAST SERPL-MCNC: 80 MG/DL (ref 65–99)
HCT VFR BLD AUTO: 42.9 % (ref 34.8–46.1)
HGB BLD-MCNC: 14.2 G/DL (ref 11.5–15.4)
IMM GRANULOCYTES # BLD AUTO: 0.02 THOUSAND/UL (ref 0–0.2)
IMM GRANULOCYTES NFR BLD AUTO: 0 % (ref 0–2)
LYMPHOCYTES # BLD AUTO: 1.54 THOUSANDS/ÂΜL (ref 0.6–4.47)
LYMPHOCYTES NFR BLD AUTO: 34 % (ref 14–44)
MCH RBC QN AUTO: 32.6 PG (ref 26.8–34.3)
MCHC RBC AUTO-ENTMCNC: 33.1 G/DL (ref 31.4–37.4)
MCV RBC AUTO: 98 FL (ref 82–98)
MONOCYTES # BLD AUTO: 0.56 THOUSAND/ÂΜL (ref 0.17–1.22)
MONOCYTES NFR BLD AUTO: 12 % (ref 4–12)
NEUTROPHILS # BLD AUTO: 2.16 THOUSANDS/ÂΜL (ref 1.85–7.62)
NEUTS SEG NFR BLD AUTO: 49 % (ref 43–75)
NRBC BLD AUTO-RTO: 0 /100 WBCS
PLATELET # BLD AUTO: 126 THOUSANDS/UL (ref 149–390)
PMV BLD AUTO: 10.3 FL (ref 8.9–12.7)
POTASSIUM SERPL-SCNC: 3.6 MMOL/L (ref 3.5–5.3)
PROT SERPL-MCNC: 7.9 G/DL (ref 6.4–8.4)
RBC # BLD AUTO: 4.36 MILLION/UL (ref 3.81–5.12)
SODIUM SERPL-SCNC: 139 MMOL/L (ref 135–147)
WBC # BLD AUTO: 4.51 THOUSAND/UL (ref 4.31–10.16)

## 2023-07-21 PROCEDURE — 85025 COMPLETE CBC W/AUTO DIFF WBC: CPT

## 2023-07-21 PROCEDURE — 36415 COLL VENOUS BLD VENIPUNCTURE: CPT

## 2023-07-21 PROCEDURE — 80053 COMPREHEN METABOLIC PANEL: CPT

## 2023-08-01 NOTE — PRE-PROCEDURE INSTRUCTIONS
Pre-Surgery Instructions:   Medication Instructions   • albuterol (PROVENTIL HFA,VENTOLIN HFA) 90 mcg/act inhaler Uses PRN- OK to take day of surgery   • ALPRAZolam (XANAX) 0.25 mg tablet Uses PRN- OK to take day of surgery   • azelastine (ASTELIN) 0.1 % nasal spray Uses PRN- OK to take day of surgery   • cetirizine (ZyrTEC) 10 mg tablet Uses PRN- OK to take day of surgery   • escitalopram (LEXAPRO) 10 mg tablet Take day of surgery. • famotidine (PEPCID) 20 mg tablet Take night before surgery   • GLUCOSAMINE-CHONDROITIN PO Stop taking 3 days prior to surgery. • hyoscyamine (LEVSIN/SL) 0.125 mg SL tablet Uses PRN- OK to take day of surgery   • Lactobacillus Rhamnosus, GG, (CULTURELLE PO) Stop taking 3 days prior to surgery. • metoprolol succinate (TOPROL-XL) 25 mg 24 hr tablet Take day of surgery. • multivitamin (THERAGRAN) TABS Stop taking 3 days prior to surgery. • omeprazole (PriLOSEC) 20 mg delayed release capsule Take day of surgery. Medication instructions for day surgery reviewed. Please use only a sip of water to take your instructed medications. Avoid all over the counter vitamins, supplements and NSAIDS for one week prior to surgery per anesthesia guidelines. Tylenol is ok to take as needed. You will receive a call one business day prior to surgery with an arrival time and hospital directions. If your surgery is scheduled on a Monday, the hospital will be calling you on the Friday prior to your surgery. If you have not heard from anyone by 8pm, please call the hospital supervisor through the hospital  at [phone]. My[employer] [phone]). Do not eat or drink anything after midnight the night before your surgery, including candy, mints, lifesavers, or chewing gum. Do not drink alcohol 24hrs before your surgery. Try not to smoke at least 24hrs before your surgery.        Follow the pre surgery showering instructions as listed in the [facility] Surgical Experience Booklet” or otherwise provided by your surgeon's office. Do not shave the surgical area 24 hours before surgery. Do not apply any lotions, creams, including makeup, cologne, deodorant, or perfumes after showering on the day of your surgery. No contact lenses, eye make-up, or artificial eyelashes. Remove nail polish, including gel polish, and any artificial, gel, or acrylic nails if possible. Remove all jewelry including rings and body piercing jewelry. Wear causal clothing that is easy to take on and off. Consider your type of surgery. Keep any valuables, jewelry, piercings at home. Please bring any specially ordered equipment (sling, braces) if indicated. Arrange for a responsible person to drive you to and from the hospital on the day of your surgery. Visitor Guidelines discussed. Call the surgeon's office with any new illnesses, exposures, or additional questions prior to surgery. Please reference your [facility] Surgical Experience Booklet” for additional information to prepare for your upcoming surgery.

## 2023-08-03 ENCOUNTER — ANESTHESIA EVENT (OUTPATIENT)
Dept: PERIOP | Facility: HOSPITAL | Age: 38
End: 2023-08-03
Payer: COMMERCIAL

## 2023-08-04 ENCOUNTER — APPOINTMENT (OUTPATIENT)
Dept: MAMMOGRAPHY | Facility: HOSPITAL | Age: 38
End: 2023-08-04
Payer: COMMERCIAL

## 2023-08-04 ENCOUNTER — ANESTHESIA (OUTPATIENT)
Dept: PERIOP | Facility: HOSPITAL | Age: 38
End: 2023-08-04
Payer: COMMERCIAL

## 2023-08-04 ENCOUNTER — HOSPITAL ENCOUNTER (OUTPATIENT)
Facility: HOSPITAL | Age: 38
Setting detail: OUTPATIENT SURGERY
Discharge: HOME | End: 2023-08-04
Attending: SURGERY | Admitting: SURGERY
Payer: COMMERCIAL

## 2023-08-04 VITALS
RESPIRATION RATE: 16 BRPM | DIASTOLIC BLOOD PRESSURE: 69 MMHG | HEART RATE: 71 BPM | HEIGHT: 68 IN | TEMPERATURE: 97.1 F | BODY MASS INDEX: 26.23 KG/M2 | OXYGEN SATURATION: 99 % | WEIGHT: 173.06 LBS | SYSTOLIC BLOOD PRESSURE: 117 MMHG

## 2023-08-04 DIAGNOSIS — N64.89 RADIAL SCAR OF RIGHT BREAST: ICD-10-CM

## 2023-08-04 DIAGNOSIS — R92.8 ABNORMAL FINDING ON BREAST IMAGING: ICD-10-CM

## 2023-08-04 PROBLEM — Z98.890 PONV (POSTOPERATIVE NAUSEA AND VOMITING): Status: ACTIVE | Noted: 2023-08-04

## 2023-08-04 PROBLEM — R11.2 PONV (POSTOPERATIVE NAUSEA AND VOMITING): Status: ACTIVE | Noted: 2023-08-04

## 2023-08-04 LAB
EXT PREGNANCY TEST URINE: NEGATIVE
EXT. CONTROL: NORMAL

## 2023-08-04 PROCEDURE — 88341 IMHCHEM/IMCYTCHM EA ADD ANTB: CPT | Performed by: SPECIALIST

## 2023-08-04 PROCEDURE — 88307 TISSUE EXAM BY PATHOLOGIST: CPT | Performed by: SPECIALIST

## 2023-08-04 PROCEDURE — 81025 URINE PREGNANCY TEST: CPT | Performed by: SURGERY

## 2023-08-04 PROCEDURE — NC001 PR NO CHARGE: Performed by: PHYSICIAN ASSISTANT

## 2023-08-04 PROCEDURE — 88342 IMHCHEM/IMCYTCHM 1ST ANTB: CPT | Performed by: SPECIALIST

## 2023-08-04 PROCEDURE — NC001 PR NO CHARGE: Performed by: SURGERY

## 2023-08-04 PROCEDURE — 76098 X-RAY EXAM SURGICAL SPECIMEN: CPT | Performed by: SURGERY

## 2023-08-04 PROCEDURE — 19301 PARTIAL MASTECTOMY: CPT | Performed by: SURGERY

## 2023-08-04 PROCEDURE — 19301 PARTIAL MASTECTOMY: CPT | Performed by: PHYSICIAN ASSISTANT

## 2023-08-04 RX ORDER — SODIUM CHLORIDE 9 MG/ML
125 INJECTION, SOLUTION INTRAVENOUS CONTINUOUS
Status: DISCONTINUED | OUTPATIENT
Start: 2023-08-04 | End: 2023-08-04 | Stop reason: HOSPADM

## 2023-08-04 RX ORDER — PROPOFOL 10 MG/ML
INJECTION, EMULSION INTRAVENOUS CONTINUOUS PRN
Status: DISCONTINUED | OUTPATIENT
Start: 2023-08-04 | End: 2023-08-04

## 2023-08-04 RX ORDER — FENTANYL CITRATE/PF 50 MCG/ML
50 SYRINGE (ML) INJECTION
Status: DISCONTINUED | OUTPATIENT
Start: 2023-08-04 | End: 2023-08-04 | Stop reason: HOSPADM

## 2023-08-04 RX ORDER — SCOLOPAMINE TRANSDERMAL SYSTEM 1 MG/1
1 PATCH, EXTENDED RELEASE TRANSDERMAL
Status: DISCONTINUED | OUTPATIENT
Start: 2023-08-04 | End: 2023-08-04 | Stop reason: HOSPADM

## 2023-08-04 RX ORDER — ONDANSETRON 2 MG/ML
INJECTION INTRAMUSCULAR; INTRAVENOUS AS NEEDED
Status: DISCONTINUED | OUTPATIENT
Start: 2023-08-04 | End: 2023-08-04

## 2023-08-04 RX ORDER — DEXAMETHASONE SODIUM PHOSPHATE 10 MG/ML
INJECTION, SOLUTION INTRAMUSCULAR; INTRAVENOUS AS NEEDED
Status: DISCONTINUED | OUTPATIENT
Start: 2023-08-04 | End: 2023-08-04

## 2023-08-04 RX ORDER — LIDOCAINE HYDROCHLORIDE 20 MG/ML
INJECTION, SOLUTION EPIDURAL; INFILTRATION; INTRACAUDAL; PERINEURAL AS NEEDED
Status: DISCONTINUED | OUTPATIENT
Start: 2023-08-04 | End: 2023-08-04

## 2023-08-04 RX ORDER — GLYCOPYRROLATE 0.2 MG/ML
INJECTION INTRAMUSCULAR; INTRAVENOUS AS NEEDED
Status: DISCONTINUED | OUTPATIENT
Start: 2023-08-04 | End: 2023-08-04

## 2023-08-04 RX ORDER — FENTANYL CITRATE 50 UG/ML
INJECTION, SOLUTION INTRAMUSCULAR; INTRAVENOUS AS NEEDED
Status: DISCONTINUED | OUTPATIENT
Start: 2023-08-04 | End: 2023-08-04

## 2023-08-04 RX ORDER — ACETAMINOPHEN 325 MG/1
650 TABLET ORAL EVERY 6 HOURS PRN
Status: DISCONTINUED | OUTPATIENT
Start: 2023-08-04 | End: 2023-08-04 | Stop reason: HOSPADM

## 2023-08-04 RX ORDER — METOCLOPRAMIDE HYDROCHLORIDE 5 MG/ML
INJECTION INTRAMUSCULAR; INTRAVENOUS AS NEEDED
Status: DISCONTINUED | OUTPATIENT
Start: 2023-08-04 | End: 2023-08-04

## 2023-08-04 RX ORDER — ONDANSETRON 2 MG/ML
4 INJECTION INTRAMUSCULAR; INTRAVENOUS ONCE AS NEEDED
Status: DISCONTINUED | OUTPATIENT
Start: 2023-08-04 | End: 2023-08-04 | Stop reason: HOSPADM

## 2023-08-04 RX ORDER — MIDAZOLAM HYDROCHLORIDE 2 MG/2ML
INJECTION, SOLUTION INTRAMUSCULAR; INTRAVENOUS AS NEEDED
Status: DISCONTINUED | OUTPATIENT
Start: 2023-08-04 | End: 2023-08-04

## 2023-08-04 RX ORDER — BUPIVACAINE HYDROCHLORIDE 5 MG/ML
INJECTION, SOLUTION EPIDURAL; INTRACAUDAL AS NEEDED
Status: DISCONTINUED | OUTPATIENT
Start: 2023-08-04 | End: 2023-08-04 | Stop reason: HOSPADM

## 2023-08-04 RX ORDER — CEFAZOLIN SODIUM 1 G/50ML
1000 SOLUTION INTRAVENOUS
Status: DISCONTINUED | OUTPATIENT
Start: 2023-08-04 | End: 2023-08-04 | Stop reason: HOSPADM

## 2023-08-04 RX ORDER — KETOROLAC TROMETHAMINE 30 MG/ML
INJECTION, SOLUTION INTRAMUSCULAR; INTRAVENOUS AS NEEDED
Status: DISCONTINUED | OUTPATIENT
Start: 2023-08-04 | End: 2023-08-04

## 2023-08-04 RX ORDER — PROPOFOL 10 MG/ML
INJECTION, EMULSION INTRAVENOUS AS NEEDED
Status: DISCONTINUED | OUTPATIENT
Start: 2023-08-04 | End: 2023-08-04

## 2023-08-04 RX ADMIN — LIDOCAINE HYDROCHLORIDE 100 MG: 20 INJECTION, SOLUTION EPIDURAL; INFILTRATION; INTRACAUDAL at 07:34

## 2023-08-04 RX ADMIN — SCOPALAMINE 1 PATCH: 1 PATCH, EXTENDED RELEASE TRANSDERMAL at 06:51

## 2023-08-04 RX ADMIN — SODIUM CHLORIDE: 0.9 INJECTION, SOLUTION INTRAVENOUS at 08:13

## 2023-08-04 RX ADMIN — FENTANYL CITRATE 25 MCG: 50 INJECTION INTRAMUSCULAR; INTRAVENOUS at 08:03

## 2023-08-04 RX ADMIN — FENTANYL CITRATE 25 MCG: 50 INJECTION INTRAMUSCULAR; INTRAVENOUS at 07:28

## 2023-08-04 RX ADMIN — FENTANYL CITRATE 25 MCG: 50 INJECTION INTRAMUSCULAR; INTRAVENOUS at 08:11

## 2023-08-04 RX ADMIN — MIDAZOLAM 4 MG: 1 INJECTION INTRAMUSCULAR; INTRAVENOUS at 07:28

## 2023-08-04 RX ADMIN — GLYCOPYRROLATE 0.1 MG: 0.2 INJECTION INTRAMUSCULAR; INTRAVENOUS at 07:53

## 2023-08-04 RX ADMIN — KETOROLAC TROMETHAMINE 30 MG: 30 INJECTION, SOLUTION INTRAMUSCULAR; INTRAVENOUS at 08:22

## 2023-08-04 RX ADMIN — SODIUM CHLORIDE 125 ML/HR: 0.9 INJECTION, SOLUTION INTRAVENOUS at 05:48

## 2023-08-04 RX ADMIN — ONDANSETRON 4 MG: 2 INJECTION INTRAMUSCULAR; INTRAVENOUS at 08:14

## 2023-08-04 RX ADMIN — PROPOFOL 100 MCG/KG/MIN: 10 INJECTION, EMULSION INTRAVENOUS at 07:36

## 2023-08-04 RX ADMIN — CEFAZOLIN SODIUM 1000 MG: 1 SOLUTION INTRAVENOUS at 07:31

## 2023-08-04 RX ADMIN — FENTANYL CITRATE 25 MCG: 50 INJECTION INTRAMUSCULAR; INTRAVENOUS at 07:48

## 2023-08-04 RX ADMIN — DEXAMETHASONE SODIUM PHOSPHATE 5 MG: 10 INJECTION INTRAMUSCULAR; INTRAVENOUS at 07:43

## 2023-08-04 RX ADMIN — FENTANYL CITRATE 25 MCG: 50 INJECTION INTRAMUSCULAR; INTRAVENOUS at 07:55

## 2023-08-04 RX ADMIN — PROPOFOL 200 MG: 10 INJECTION, EMULSION INTRAVENOUS at 07:34

## 2023-08-04 RX ADMIN — METOCLOPRAMIDE 5 MG: 5 INJECTION, SOLUTION INTRAMUSCULAR; INTRAVENOUS at 07:53

## 2023-08-04 NOTE — H&P
H&P Exam - Surgical Oncology   Madyson Engel 34 y.o. female MRN: [id number]  Unit/Bed#: [facility] [place] Encounter: [id number]    Assessment/Plan     Assessment:  Radial scar right breast  Plan:  TONYA localized lumpectomy right breast    History of Present Illness   HX and PE limited by: Not applicable  HPI:  Madyson Engel is a 34 y.o. female who presents with an abnormal mammogram of the right breast status post needle biopsy revealing a radial scar. She was counseled on surgical excision. .    Review of Systems   Constitutional: Negative. Negative for appetite change and fever. HENT: Positive for tinnitus. Eyes: Negative. Respiratory: Negative for shortness of breath. Cardiovascular: Negative. Gastrointestinal: Negative. Endocrine: Negative. Genitourinary: Negative. Musculoskeletal: Positive for back pain. Negative for arthralgias and myalgias. Skin: Negative. Allergic/Immunologic: Positive for environmental allergies. Neurological: Negative. Hematological: Negative. Negative for adenopathy. Does not bruise/bleed easily. Psychiatric/Behavioral: Negative.         Historical Information   Past Medical History:   Diagnosis Date   • Anxiety    • Asthma     hx-infrequent as adult   • Back pain     history of sports injuries   • Depression    • Eating disorder    • GERD (gastroesophageal reflux disease)    • PONV (postoperative nausea and vomiting)      Past Surgical History:   Procedure Laterality Date   • BREAST BIOPSY Right 06/06/2023   • EGD     • US BREAST NEEDLE LOC LEFT Left 05/09/2012   • US BREAST TONYA  NEEDLE LOC RIGHT Right 07/14/2023   • US GUIDED BREAST BIOPSY RIGHT COMPLETE Right 06/06/2023   • WISDOM TOOTH EXTRACTION       Social History   Social History     Substance and Sexual Activity   Alcohol Use Yes   • Alcohol/week: 3.0 standard drinks of alcohol   • Types: 3 Glasses of wine per week     Social History     Substance and Sexual Activity   Drug Use No Social History     Tobacco Use   Smoking Status Former   • Types: Cigarettes   • Quit date: 2013   • Years since quitting: 10.5   Smokeless Tobacco Never     E-Cigarette/Vaping   • E-Cigarette Use Never User      E-Cigarette/Vaping Substances   • Nicotine No    • THC No    • CBD No    • Flavoring No    • Other No    • Unknown No      Family History   Problem Relation Age of Onset   • Irregular heart beat Mother         Proxysmal Atrial Tachycardia, PVC   • Asthma Father    • Hyperlipidemia Father    • Hypertension Father    • Anxiety disorder Father    • No Known Problems Sister    • Ovarian cancer Maternal Aunt 79   • Breast cancer Maternal Aunt 69        stage 4 metastatic breast cancer   • Uterine cancer Maternal Grandmother 68   • Prostate cancer Maternal Grandfather 67   • No Known Problems Paternal Grandmother    • Emphysema Paternal Grandfather        Meds/Allergies   all current active meds have been reviewed  Allergies   Allergen Reactions   • Bee Venom Rash     Cellulitis   • Codeine      Other reaction(s): Nausea and/or vomiting   • Oxycodone-Acetaminophen Vomiting       Objective   Vitals: Blood pressure 125/84, pulse 67, temperature 97.5 °F (36.4 °C), temperature source Temporal, resp. rate 16, height 5' 8" (1.727 m), weight 78.5 kg (173 lb 1 oz), last menstrual period 07/30/2023, SpO2 95 %, not currently breastfeeding. Intake/Output Summary (Last 24 hours) at 8/4/2023 0819  Last data filed at 8/4/2023 0813  Gross per 24 hour   Intake 1000 ml   Output --   Net 1000 ml       Invasive Devices     Peripheral Intravenous Line  Duration           Peripheral IV 08/04/23 Dorsal (posterior); Left Hand <1 day          Airway  Duration           Supraglottic Airway LMA 3 <1 day                Physical Exam  Constitutional:       General: She is not in acute distress. Appearance: Normal appearance. HENT:      Head: Normocephalic and atraumatic.    Cardiovascular:      Heart sounds: Normal heart sounds. Pulmonary:      Breath sounds: Normal breath sounds. Abdominal:      Palpations: Abdomen is soft. Musculoskeletal:      Right lower leg: No edema. Left lower leg: No edema. Neurological:      Mental Status: She is alert and oriented to person, place, and time.    Psychiatric:         Mood and Affect: Mood normal.         Lab Results:   Imaging: I have personally reviewed pertinent films in PACS  Pathology, and Other Studies: As noted    Code Status: No Order  Advance Directive and Living Will:      Power of :    POLST:

## 2023-08-04 NOTE — DISCHARGE INSTR - AVS FIRST PAGE
POST-OPERATIVE CARE INSTRUCTIONS       Care after your procedure:   General  Rest and relax for 24 hours, then gradually return to normal activities. Do not preform any heavy lifting or strenuous physical activities for 14 days. Your activity restrictions will be re-evaluated at your post op visit. Drink clear liquids until you are certain there is no nausea, then resume a normal diet. Do not drink alcohol, drive any vehicle, operate mechanical equipment or make critical decisions for at least 24 hours and until you are off any narcotic pain medications. The Incision  Your incision is closed with:   dissolvable stiches just underneath the skin. The incision is also covered with:                          clear waterproof glue  A gauze-pad is covering the wound. Wound care  Remove your gauze-pad after 24 hours. You may then shower using soap and water to clean your incision. Gently dry the wound. You may redress your wound with additional gauze and tape if you choose. A little bruising at the wound site is normal.    Medication  Resume all previous medications  Take either Naproxen (Aleve) one tablet every 8 hours or Ibuprofen(Advil/Motrin) one(1) to two(2) tablets every 6 hours around the clock for the first 2-3 days. Take this even if you don't think you need it for at least the first 24 hours. Pain Medication Instructions: May also take over-the-counter Tylenol          Other (If applicable)  Wear a post-surgical bra around the clock. May use ice to the incision site(s) for the next 24-48 hours, twice daily.    Call your  doctor if you have any of the following:  Redness, swelling, heat, drainage, and/or bleeding from your wound  Chills or fever ( above 101' F )  Pain, not relieved with the above medications  If you have any questions or problems call our office [phone]    Follow-up appointment:  As scheduled

## 2023-08-04 NOTE — ANESTHESIA PREPROCEDURE EVALUATION
Procedure:  LUMPECTOMY BREAST TONYA LOCALIZED (Right: Breast)    Relevant Problems   ANESTHESIA   (+) PONV (postoperative nausea and vomiting)      CARDIO (within normal limits)      GI/HEPATIC   (+) GERD (gastroesophageal reflux disease)      MUSCULOSKELETAL   (+) Low back pain      NEURO/PSYCH   (+) Anxiety disorder   (+) Depression      PULMONARY   (+) Asthma        Physical Exam    Airway    Mallampati score: I  TM Distance: >3 FB  Neck ROM: full     Dental   No notable dental hx     Cardiovascular  Rhythm: regular, Rate: normal, Cardiovascular exam normal    Pulmonary  Pulmonary exam normal Breath sounds clear to auscultation,     Other Findings        Anesthesia Plan  ASA Score- 2     Anesthesia Type- general with ASA Monitors. Additional Monitors:   Airway Plan:           Plan Factors-Exercise tolerance (METS): >4 METS. Chart reviewed. Patient summary reviewed. Patient is not a current smoker. Induction- intravenous. Postoperative Plan-     Informed Consent- Anesthetic plan and risks discussed with patient.

## 2023-08-04 NOTE — ANESTHESIA POSTPROCEDURE EVALUATION
Post-Op Assessment Note    CV Status:  Stable    Pain management: adequate     Mental Status:  Alert and awake   Hydration Status:  Euvolemic   PONV Controlled:  Controlled   Airway Patency:  Patent      Post Op Vitals Reviewed: Yes      Staff: Anesthesiologist, CRNA         No notable events documented.     BP      Temp     Pulse     Resp      SpO2      /77 (BP Location: Left arm)   Pulse 65   Temp (!) 97.2 °F (36.2 °C) (Temporal)   Resp 16   Ht 5' 8" (1.727 m)   Wt 78.5 kg (173 lb 1 oz)   LMP 07/30/2023 (Exact Date)   SpO2 99%   BMI 26.31 kg/m²

## 2023-08-04 NOTE — OP NOTE
OPERATIVE REPORT  PATIENT NAME: Lucia Baez    :  1985  MRN: [id number]  Pt Location: [facility] OR ROOM 05    SURGERY DATE: 2023    Surgeon(s) and Role:     * Bridgett Lino MD - Primary     * Moshe Lowry PA-C - Assisting    Preop Diagnosis:  Radial scar of right breast [N64.89]  Abnormal finding on breast imaging [R92.8]    Post-Op Diagnosis Codes:     * Radial scar of right breast [N64.89]     * Abnormal finding on breast imaging [R92.8]    Procedure(s):  Right - LUMPECTOMY BREAST TONYA LOCALIZED  Use of Tonya   Specimen radiograph    Specimen(s):  ID Type Source Tests Collected by Time Destination   1 : RIGHT BREAST LUMPECTOMY. Short stitch superior and long stitch lateral Tissue Breast, Right TISSUE EXAM Bridgett Lino MD 2023 0923        Estimated Blood Loss:   Minimal    Drains:  * No LDAs found *    Anesthesia Type:   General    Operative Indications:  Radial scar of right breast [N64.89]  Abnormal finding on breast imaging [R92.8]      Operative Findings:  Tonya reflector and standard clip in specimen    Complications:   None    Procedure and Technique:  Moni Balderas is a 40-year-old female who had an abnormal mammogram of the right breast.  Needle biopsy revealed a radial scar. She was counseled on surgical excision. She had a TONYA reflector placed for localization purposes. She presented the day of surgery to the operating room. She had preoperative antibiotics. She was administered general anesthesia. She was prepped and draped in the usual standard fashion. The savvy probe was used to identify the reflector in the medial aspect of the breast.  Half percent Marcaine plain was injected for local anesthesia. A circumareolar incision was created through the skin and subcutaneous tissue. Cautery was used to dissect deep and medial to the area of concern. This tissue was elevated into the wound using an Allis clamp. A small rim of tissue was excised around the clips.   The specimen was marked with a short stitch superior and a long stitch lateral.  The specimen was interrogated to confirm reflector removal.  The specimen was then imaged in the operating room revealing the [facility] reflector and standard clip. The tissue was then submitted to pathology in formalin. Her wound was irrigated and hemostasis was achieved. Wound was then closed in a layered fashion using multiple interrupted 3-0 Monocryl suture and a running 4-0 Monocryl subcuticular stitch. All counts were correct. The skin was cleaned and dried. Surgical glue, fluffs and a bra were applied. The patient was then extubated and taken to recovery in stable condition. A physician assistant was required during the procedure for retraction, tissue handling, dissection and suturing; no residents were available.     Patient Disposition:  extubated and stable    This procedure was not performed to treat breast cancer through sentinel node biopsy      SIGNATURE: Luis Salazar MD  DATE: August 4, 2023  TIME: 8:26 AM

## 2023-08-08 PROCEDURE — 88307 TISSUE EXAM BY PATHOLOGIST: CPT | Performed by: SPECIALIST

## 2023-08-08 PROCEDURE — 88341 IMHCHEM/IMCYTCHM EA ADD ANTB: CPT | Performed by: SPECIALIST

## 2023-08-08 PROCEDURE — 88342 IMHCHEM/IMCYTCHM 1ST ANTB: CPT | Performed by: SPECIALIST

## 2023-08-10 ENCOUNTER — TELEMEDICINE (OUTPATIENT)
Dept: BEHAVIORAL/MENTAL HEALTH CLINIC | Facility: CLINIC | Age: 38
End: 2023-08-10
Payer: COMMERCIAL

## 2023-08-10 DIAGNOSIS — F32.9 MAJOR DEPRESSIVE DISORDER WITH CURRENT ACTIVE EPISODE, UNSPECIFIED DEPRESSION EPISODE SEVERITY, UNSPECIFIED WHETHER RECURRENT: ICD-10-CM

## 2023-08-10 DIAGNOSIS — F41.1 GENERALIZED ANXIETY DISORDER: Primary | ICD-10-CM

## 2023-08-10 PROCEDURE — 99999 PR OFFICE/OUTPT VISIT,PROCEDURE ONLY: CPT | Performed by: SOCIAL WORKER

## 2023-08-10 PROCEDURE — 90832 PSYTX W PT 30 MINUTES: CPT | Performed by: SOCIAL WORKER

## 2023-08-14 NOTE — BH TREATMENT PLAN
[id number]  1985     Date of Initial Psychotherapy Assessment: 4/2/21   Date of Current Treatment Plan: 08/14/23  Treatment Plan Target Date: 2/10/24  Treatment Plan Expiration Date: 2/10/24    Diagnosis:   1. Generalized anxiety disorder        2. Major depressive disorder with current active episode, unspecified depression episode severity, unspecified whether recurrent            Area(s) of Need:   Grief/loss  Stress     Long Term Goal 1 (in the client's own words): Be able to cope with the losses     Stage of Change: Action     Target Date for completion: TBD             Anticipated therapeutic modalities: CBT             People identified to complete this goal: Jelena Owens                    Objective 1: (identify the means of measuring success in meeting the objective):               Be able to talk about it         Do not minimize what I am feeling                    Objective 2: (identify the means of measuring success in meeting the objective): Use my support system         Set boundaries with mom      Long Term Goal 2 (in the client's own words): Be able to manage my stress        Stage of Change: Action     Target Date for completion: TBD             Anticipated therapeutic modalities: CBT             People identified to complete this goal: Jelena Owens                    Objective 1: (identify the means of measuring success in meeting the objective):           Set boundaries with others          Be able to identify my triggers                    Objective 2: (identify the means of measuring success in meeting the objective):            Use healthy coping mechanisms for stress           I am currently under the care of a [facility] psychiatric provider: no     My [facility] psychiatric provider is: N/A     I am currently taking psychiatric medications: N/A     I feel that I will be ready for discharge from mental health care when I reach the following (measurable goal/objective): Goals completed 90%     For children and adults who have a legal guardian:          Has there been any change to custody orders and/or guardianship status? NA. If yes, attach updated documentation.     I have created my Crisis Plan and have been offered a copy of this plan     [address]: Diagnosis and Treatment Plan explained to Mary GONCALVES acknowledges an understanding of their diagnosis.  Chivo Appiah agrees to this treatment plan.     I have been offered a copy of this Treatment Plan. yes

## 2023-08-14 NOTE — PSYCH
Virtual Regular Visit    Verification of patient location:    Patient is located at Home in the following state in which I hold an active license [place]      Assessment/Plan:    Problem List Items Addressed This Visit        Other    Anxiety disorder - Primary    Depression       Goals addressed in session: Goal 1          Reason for visit is   Chief Complaint   Patient presents with   • Virtual Regular Visit        Encounter provider Marta Michel    Provider located at [address] [place] [id number]  [phone]      Recent Visits  Date Type Provider Dept   08/10/23 [id number] [facility] Sierra   Showing recent visits within past 7 days and meeting all other requirements  Future Appointments  No visits were found meeting these conditions. Showing future appointments within next 150 days and meeting all other requirements       The patient was identified by name and date of birth. Yves Suh was informed that this is a telemedicine visit and that the visit is being conducted through the [facility]. She agrees to proceed. .  My office door was closed. No one else was in the room. She acknowledged consent and understanding of privacy and security of the video platform. The patient has agreed to participate and understands they can discontinue the visit at any time. Patient is aware this is a billable service.        HPI     Past Medical History:   Diagnosis Date   • Anxiety    • Asthma     hx-infrequent as adult   • Back pain     history of sports injuries   • Depression    • Eating disorder    • GERD (gastroesophageal reflux disease)    • PONV (postoperative nausea and vomiting)        Past Surgical History:   Procedure Laterality Date   • BREAST BIOPSY Right 06/06/2023   • BREAST LUMPECTOMY Right 8/4/2023    Procedure: LUMPECTOMY BREAST TONYA LOCALIZED;  Surgeon: Hellen Kelly MD;  Location: [facility];  Service: Surgical Oncology   • EGD     • US BREAST NEEDLE LOC LEFT Left 05/09/2012   • US BREAST TONYA  NEEDLE LOC RIGHT Right 07/14/2023   • US GUIDED BREAST BIOPSY RIGHT COMPLETE Right 06/06/2023   • WISDOM TOOTH EXTRACTION         Current Outpatient Medications   Medication Sig Dispense Refill   • albuterol (PROVENTIL HFA,VENTOLIN HFA) 90 mcg/act inhaler Inhale every 4 (four) hours as needed     • ALPRAZolam (XANAX) 0.25 mg tablet Take 1 tablet (0.25 mg total) by mouth daily at bedtime as needed for anxiety (Patient taking differently: Take 0.25 mg by mouth if needed for anxiety) 15 tablet 0   • azelastine (ASTELIN) 0.1 % nasal spray 2 sprays into each nostril as needed       • cetirizine (ZyrTEC) 10 mg tablet Take 1 tablet (10 mg total) by mouth daily (Patient taking differently: Take 10 mg by mouth as needed) 90 tablet 3   • escitalopram (LEXAPRO) 10 mg tablet Take 1.5 tablets (15 mg total) by mouth daily 135 tablet 3   • famotidine (PEPCID) 20 mg tablet Take 1 tablet (20 mg total) by mouth daily (Patient taking differently: Take 20 mg by mouth daily at bedtime) 90 tablet 3   • GLUCOSAMINE-CHONDROITIN PO Take by mouth     • hyoscyamine (LEVSIN/SL) 0.125 mg SL tablet Take 1 tablet (0.125 mg total) by mouth every 6 (six) hours as needed for cramping or diarrhea 60 tablet 5   • Lactobacillus Rhamnosus, GG, (CULTURELLE PO) Take by mouth     • lidocaine (Lidoderm) 5 % Apply 1 patch topically over 12 hours daily Remove & Discard patch within 12 hours or as directed by MD (Patient not taking: Reported on 5/20/2023) 30 patch 0   • methocarbamol (ROBAXIN) 500 mg tablet Take 1 tablet (500 mg total) by mouth 4 (four) times a day (Patient not taking: Reported on 7/3/2023) 40 tablet 0   • methocarbamol (ROBAXIN) 500 mg tablet Take 1 tablet (500 mg total) by mouth daily at bedtime as needed for muscle spasms (Patient not taking: Reported on 7/3/2023) 15 tablet 0   • metoprolol succinate (TOPROL-XL) 25 mg 24 hr tablet TAKE ONE TABLET BY MOUTH EVERY DAY 90 tablet 3   • multivitamin (THERAGRAN) TABS Take 1 tablet by mouth daily     • naproxen (Naprosyn) 500 mg tablet Take 1 tablet (500 mg total) by mouth 2 (two) times a day with meals (Patient not taking: Reported on 5/20/2023) 30 tablet 0   • omeprazole (PriLOSEC) 20 mg delayed release capsule Take 1 capsule (20 mg total) by mouth daily 90 capsule 3     No current facility-administered medications for this visit. Allergies   Allergen Reactions   • Bee Venom Rash     Cellulitis   • Codeine      Other reaction(s): Nausea and/or vomiting   • Oxycodone-Acetaminophen Vomiting       Review of Systems    Video Exam    There were no vitals filed for this visit. Physical Exam     Behavioral Health Psychotherapy Progress Note    Psychotherapy Provided: Individual Psychotherapy     1. Generalized anxiety disorder        2. Major depressive disorder with current active episode, unspecified depression episode severity, unspecified whether recurrent            Goals addressed in session: Goal 1     DATA: Carmelo Mercado stated that things have been going well overall. She stated that she had her breast surgery and the mass was benign. She stated that her  had also had his shoulder surgery and that they have been home together. She stated that it is somewhat stressful financially but that they are getting by. She shared that she has not yet been notified to test so her start date with work continues to be pushed off. Processing her emotions and discussing her path forward in her new career. She also shared that one of her dogs has been diagnosed with concern which has been very upsetting. Discussing spending quality time with her and being able to cope when she passes.  Giving supportive therapy  During this session, this clinician used the following therapeutic modalities: Cognitive Behavioral Therapy and Supportive Psychotherapy    Substance Abuse was not addressed during this session. If the client is diagnosed with a co-occurring substance use disorder, please indicate any changes in the frequency or amount of use: N/A. Stage of change for addressing substance use diagnoses: No substance use/Not applicable    ASSESSMENT:  Darci Woodson presents with a Euthymic/ normal mood. her affect is Normal range and intensity, which is congruent, with her mood and the content of the session. The client has made progress on their goals. Continues to manage her stress appropriately Darci Woodson presents with a none risk of suicide, none risk of self-harm, and none risk of harm to others. For any risk assessment that surpasses a "low" rating, a safety plan must be developed. A safety plan was indicated: no  If yes, describe in detail N/A    PLAN: Between sessions, Darci Woodson will continue to manage her stress appropriately. At the next session, the therapist will use Cognitive Behavioral Therapy and Supportive Psychotherapy to address treatment goals. Behavioral Health Treatment Plan and Discharge Planning: Darci Woodson is aware of and agrees to continue to work on their treatment plan. They have identified and are working toward their discharge goals.  yes    Visit start and stop times:    08/10/23  Start Time: 0909  Stop Time: 0929  Total Visit Time: 20 minutes

## 2023-08-21 ENCOUNTER — OFFICE VISIT (OUTPATIENT)
Dept: SURGICAL ONCOLOGY | Facility: CLINIC | Age: 38
End: 2023-08-21

## 2023-08-21 VITALS
BODY MASS INDEX: 26.92 KG/M2 | TEMPERATURE: 97.3 F | SYSTOLIC BLOOD PRESSURE: 144 MMHG | HEIGHT: 68 IN | OXYGEN SATURATION: 97 % | WEIGHT: 177.6 LBS | DIASTOLIC BLOOD PRESSURE: 98 MMHG | HEART RATE: 73 BPM

## 2023-08-21 DIAGNOSIS — R92.8 ABNORMAL FINDING ON BREAST IMAGING: ICD-10-CM

## 2023-08-21 DIAGNOSIS — N64.89 RADIAL SCAR OF RIGHT BREAST: Primary | ICD-10-CM

## 2023-08-21 PROCEDURE — 99024 POSTOP FOLLOW-UP VISIT: CPT | Performed by: SURGERY

## 2023-08-21 NOTE — PROGRESS NOTES
34 y.o. female is here today s/p right lumpectomy for a radial scar. She reports no concerns. Physical Exam  Constitutional:       General: She is not in acute distress. Chest:   Breasts:     Right: Skin change ( well healing incision) present. Neurological:      Mental Status: She is alert and oriented to person, place, and time. Psychiatric:         Mood and Affect: Mood normal.         Data:   423 right lumpectomy reveals a radial scar, no atypia or malignancy noted        Diagnoses and all orders for this visit:    Radial scar of right breast    Abnormal finding on breast imaging  -     US breast right limited (diagnostic); Future        Assessment/Plan: 52-year-old female who presented with abnormal imaging of the breast.  1 biopsy revealed a radial scar, there was a second lesion in the right breast which required short-term follow-up. The radial scar was excised and shows the same. She will be due for a follow-up ultrasound in December. I will make these arrangements for her. She also has family history and dense breast tissue. We will therefore see her again in 6 months for another high risk visit.

## 2023-08-31 ENCOUNTER — APPOINTMENT (OUTPATIENT)
Dept: LAB | Facility: CLINIC | Age: 38
End: 2023-08-31

## 2023-08-31 ENCOUNTER — OCCMED (OUTPATIENT)
Dept: URGENT CARE | Facility: CLINIC | Age: 38
End: 2023-08-31

## 2023-08-31 DIAGNOSIS — Z02.1 ENCOUNTER FOR PRE-EMPLOYMENT HEALTH SCREENING EXAMINATION: ICD-10-CM

## 2023-08-31 DIAGNOSIS — Z02.1 ENCOUNTER FOR PRE-EMPLOYMENT HEALTH SCREENING EXAMINATION: Primary | ICD-10-CM

## 2023-08-31 LAB
MEV IGG SER QL IA: ABNORMAL
MUV IGG SER QL IA: NORMAL
RUBV IGG SERPL IA-ACNC: 20.9 IU/ML
VZV IGG SER QL IA: NORMAL

## 2023-08-31 PROCEDURE — 86735 MUMPS ANTIBODY: CPT

## 2023-08-31 PROCEDURE — 86787 VARICELLA-ZOSTER ANTIBODY: CPT

## 2023-08-31 PROCEDURE — 86762 RUBELLA ANTIBODY: CPT

## 2023-08-31 PROCEDURE — 86765 RUBEOLA ANTIBODY: CPT

## 2023-08-31 PROCEDURE — 86480 TB TEST CELL IMMUN MEASURE: CPT

## 2023-08-31 PROCEDURE — 36415 COLL VENOUS BLD VENIPUNCTURE: CPT

## 2023-09-02 LAB
GAMMA INTERFERON BACKGROUND BLD IA-ACNC: 0.02 IU/ML
M TB IFN-G BLD-IMP: NEGATIVE
M TB IFN-G CD4+ BCKGRND COR BLD-ACNC: 0 IU/ML
M TB IFN-G CD4+ BCKGRND COR BLD-ACNC: 0.23 IU/ML
MITOGEN IGNF BCKGRD COR BLD-ACNC: >10 IU/ML

## 2023-09-21 ENCOUNTER — HOSPITAL ENCOUNTER (EMERGENCY)
Facility: HOSPITAL | Age: 38
Discharge: HOME | End: 2023-09-21
Attending: EMERGENCY MEDICINE | Admitting: EMERGENCY MEDICINE
Payer: COMMERCIAL

## 2023-09-21 VITALS
RESPIRATION RATE: 17 BRPM | HEART RATE: 93 BPM | WEIGHT: 171.52 LBS | SYSTOLIC BLOOD PRESSURE: 111 MMHG | OXYGEN SATURATION: 97 % | DIASTOLIC BLOOD PRESSURE: 73 MMHG | BODY MASS INDEX: 26.08 KG/M2 | TEMPERATURE: 98.3 F

## 2023-09-21 DIAGNOSIS — K62.5 RECTAL BLEEDING: Primary | ICD-10-CM

## 2023-09-21 PROCEDURE — 99283 EMERGENCY DEPT VISIT LOW MDM: CPT | Performed by: EMERGENCY MEDICINE

## 2023-09-21 PROCEDURE — 99282 EMERGENCY DEPT VISIT SF MDM: CPT

## 2023-09-21 RX ORDER — LIDOCAINE 5 G/100G
CREAM RECTAL; TOPICAL 3 TIMES DAILY PRN
Qty: 113 G | Refills: 0 | Status: SHIPPED | OUTPATIENT
Start: 2023-09-21

## 2023-09-21 RX ORDER — HYDROCORTISONE 25 MG/G
CREAM TOPICAL 2 TIMES DAILY
Qty: 28 G | Refills: 0 | Status: SHIPPED | OUTPATIENT
Start: 2023-09-21

## 2023-09-21 NOTE — ED PROVIDER NOTES
History  Chief Complaint   Patient presents with   • Rectal Bleeding     Pt states "when I go to the bathroom even just to urinate, I wipe and there is nothing in the front but when I wipe in the back there is a significant amount of blood, almost like something burst". Pt describes the blood as bright red, denies clots. Starting around 2pm today     Patient presents for 24 hours of persistent bright red blood per rectum when she is urinating. Did have about 3 bouts of diarrhea yesterday and this started after. Denies any blood in the diarrhea. States that every time she goes to urinate, she wipes and notices blood on the toilet paper but also has had blood in the water. Patient states that she did a self vaginal exam and did not find any vaginal bleeding. She has no overall complaints such as abdominal pain, urinary complaints, fever, chills, previous symptoms of this, rectal pain, history of Crohn's or ulcerative colitis. Patient states that this is only when she is urinating and denies any hematuria. States that she is not straining when she urinates. History provided by:  Patient   used: No        Prior to Admission Medications   Prescriptions Last Dose Informant Patient Reported? Taking?    ALPRAZolam (XANAX) 0.25 mg tablet  Self No No   Sig: Take 1 tablet (0.25 mg total) by mouth daily at bedtime as needed for anxiety   Patient taking differently: Take 0.25 mg by mouth if needed for anxiety   GLUCOSAMINE-CHONDROITIN PO  Self Yes No   Sig: Take by mouth   Lactobacillus Rhamnosus, GG, (CULTURELLE PO)  Self Yes No   Sig: Take by mouth   albuterol (PROVENTIL HFA,VENTOLIN HFA) 90 mcg/act inhaler  Self Yes No   Sig: Inhale every 4 (four) hours as needed   azelastine (ASTELIN) 0.1 % nasal spray  Self Yes No   Si sprays into each nostril as needed     cetirizine (ZyrTEC) 10 mg tablet  Self No No   Sig: Take 1 tablet (10 mg total) by mouth daily   Patient taking differently: Take 10 mg by mouth as needed   escitalopram (LEXAPRO) 10 mg tablet  Self No No   Sig: Take 1.5 tablets (15 mg total) by mouth daily   famotidine (PEPCID) 20 mg tablet  Self No No   Sig: Take 1 tablet (20 mg total) by mouth daily   Patient taking differently: Take 20 mg by mouth daily at bedtime   hyoscyamine (LEVSIN/SL) 0.125 mg SL tablet  Self No No   Sig: Take 1 tablet (0.125 mg total) by mouth every 6 (six) hours as needed for cramping or diarrhea   lidocaine (Lidoderm) 5 %  Self No No   Sig: Apply 1 patch topically over 12 hours daily Remove & Discard patch within 12 hours or as directed by MD   Patient not taking: Reported on 5/20/2023   methocarbamol (ROBAXIN) 500 mg tablet  Self No No   Sig: Take 1 tablet (500 mg total) by mouth 4 (four) times a day   Patient not taking: Reported on 7/3/2023   methocarbamol (ROBAXIN) 500 mg tablet  Self No No   Sig: Take 1 tablet (500 mg total) by mouth daily at bedtime as needed for muscle spasms   Patient not taking: Reported on 7/3/2023   metoprolol succinate (TOPROL-XL) 25 mg 24 hr tablet  Self No No   Sig: TAKE ONE TABLET BY MOUTH EVERY DAY   multivitamin (THERAGRAN) TABS  Self Yes No   Sig: Take 1 tablet by mouth daily   naproxen (Naprosyn) 500 mg tablet  Self No No   Sig: Take 1 tablet (500 mg total) by mouth 2 (two) times a day with meals   Patient not taking: Reported on 5/20/2023   omeprazole (PriLOSEC) 20 mg delayed release capsule  Self No No   Sig: Take 1 capsule (20 mg total) by mouth daily      Facility-Administered Medications: None       Past Medical History:   Diagnosis Date   • Anxiety    • Asthma     hx-infrequent as adult   • Back pain     history of sports injuries   • Depression    • Eating disorder    • GERD (gastroesophageal reflux disease)    • PONV (postoperative nausea and vomiting)        Past Surgical History:   Procedure Laterality Date   • BREAST BIOPSY Right 06/06/2023   • BREAST LUMPECTOMY Right 8/4/2023    Procedure: LUMPECTOMY BREAST TONYA LOCALIZED;  Surgeon: Alaina Felton MD;  Location: [facility];  Service: Surgical Oncology   • EGD     • US BREAST NEEDLE LOC LEFT Left 05/09/2012   • US BREAST TONYA  NEEDLE LOC RIGHT Right 07/14/2023   • US GUIDED BREAST BIOPSY RIGHT COMPLETE Right 06/06/2023   • WISDOM TOOTH EXTRACTION         Family History   Problem Relation Age of Onset   • Irregular heart beat Mother         Proxysmal Atrial Tachycardia, PVC   • Asthma Father    • Hyperlipidemia Father    • Hypertension Father    • Anxiety disorder Father    • No Known Problems Sister    • Ovarian cancer Maternal Aunt 79   • Breast cancer Maternal Aunt 69        stage 4 metastatic breast cancer   • Uterine cancer Maternal Grandmother 68   • Prostate cancer Maternal Grandfather 67   • No Known Problems Paternal Grandmother    • Emphysema Paternal Grandfather      I have reviewed and agree with the history as documented. E-Cigarette/Vaping   • E-Cigarette Use Never User      E-Cigarette/Vaping Substances   • Nicotine No    • THC No    • CBD No    • Flavoring No    • Other No    • Unknown No      Social History     Tobacco Use   • Smoking status: Former     Types: Cigarettes     Quit date: 2013     Years since quitting: 10.7   • Smokeless tobacco: Never   Vaping Use   • Vaping Use: Never used   Substance Use Topics   • Alcohol use: Yes     Alcohol/week: 3.0 standard drinks of alcohol     Types: 3 Glasses of wine per week   • Drug use: No       Review of Systems   Constitutional: Negative for chills, diaphoresis and fever. Respiratory: Negative for cough, chest tightness and shortness of breath. Cardiovascular: Negative for chest pain. Gastrointestinal: Positive for anal bleeding and diarrhea. Negative for abdominal distention, abdominal pain, constipation, nausea and vomiting. Genitourinary: Negative for difficulty urinating, dysuria, flank pain, frequency, hematuria, urgency, vaginal bleeding and vaginal discharge.    Skin: Negative for color change, pallor, rash and wound. Allergic/Immunologic: Negative for immunocompromised state. Neurological: Negative for dizziness, light-headedness and headaches. All other systems reviewed and are negative. Physical Exam  Physical Exam  Vitals and nursing note reviewed. Exam conducted with a chaperone present. Constitutional:       General: She is not in acute distress. Appearance: She is well-developed. She is not ill-appearing, toxic-appearing or diaphoretic. HENT:      Head: Normocephalic and atraumatic. Right Ear: External ear normal.      Left Ear: External ear normal.      Nose: Nose normal. No congestion or rhinorrhea. Eyes:      General: No scleral icterus. Right eye: No discharge. Left eye: No discharge. Neck:      Trachea: No tracheal deviation. Cardiovascular:      Rate and Rhythm: Normal rate. Pulmonary:      Effort: Pulmonary effort is normal. No tachypnea, accessory muscle usage or respiratory distress. Breath sounds: No stridor. Abdominal:      General: There is no distension. Palpations: Abdomen is soft. Tenderness: There is no abdominal tenderness. There is no guarding. Genitourinary:     Rectum: External hemorrhoid (Nonthrombosed) present. No tenderness or anal fissure. Skin:     General: Skin is warm and dry. Neurological:      General: No focal deficit present. Mental Status: She is alert and oriented to person, place, and time. She is not disoriented.       Gait: Gait normal.   Psychiatric:         Speech: Speech normal.         Behavior: Behavior normal.         Vital Signs  ED Triage Vitals   Temperature Pulse Respirations Blood Pressure SpO2   09/21/23 0237 09/21/23 0238 09/21/23 0238 09/21/23 0238 09/21/23 0238   98.3 °F (36.8 °C) 93 17 111/73 97 %      Temp Source Heart Rate Source Patient Position - Orthostatic VS BP Location FiO2 (%)   09/21/23 0237 09/21/23 0238 09/21/23 0238 09/21/23 0238 --   Oral Monitor Sitting Right arm       Pain Score       --                  Vitals:    09/21/23 0238   BP: 111/73   Pulse: 93   Patient Position - Orthostatic VS: Sitting         Visual Acuity      ED Medications  Medications - No data to display    Diagnostic Studies  Results Reviewed     None                 No orders to display              Procedures  Procedures         ED Course                                             Medical Decision Making  Presents with concerns for rectal bleeding. Overall exam is benign. Initial rectal exam was done externally and showed a nonthrombosed hemorrhoid at the 6 o'clock position with no active bleeding. Patient went to the bathroom, urinated normally and stated that she had some blood when she wiped. I reexamined the patient at that time and did another rectal exam but this time, internally. Patient had no obvious source of bleeding but when I did wipe the surrounding perirectal skin there was some evidence of light-tinged blood. Again, no active source identified. Had a conversation with the patient about her current symptoms. Did offer to repeat blood work and compared to blood work she had about 2 weeks ago. With a soft abdomen, overall reassuring exam and only minimal blood, we decided to hold off on a current work-up. I do feel that symptoms can be explained by her recent diarrhea that she had yesterday with wiping and probable internal hemorrhoid that is bleeding. Patient has no clinical signs and symptoms of anemia and has normal vital signs and does have follow-up with gastroenterology that she can call to schedule an appointment and will return if anything worsens. No further work-up needed at this time. We will send prescriptions over to the pharmacy to treat for probable hemorrhoids and also advised Aquaphor to use for moisture and comfort. Questions and concerns answered. Risk  OTC drugs. Prescription drug management.           Disposition  Final diagnoses:   Rectal bleeding     Time reflects when diagnosis was documented in both MDM as applicable and the Disposition within this note     Time User Action Codes Description Comment    9/21/2023  4:25 AM Rosa Juan Add [K62.5] Rectal bleeding       ED Disposition     ED Disposition   Discharge    Condition   Stable    Date/Time   Thu Sep 21, 2023  4:25 AM    Comment   [facility] discharge to home/self care. Follow-up Information     Follow up With Specialties Details Why Contact Info    Holger Blackmon MD Gastroenterology Call today To schedule an appointment as soon as you [facility]            Discharge Medication List as of 9/21/2023  4:30 AM      START taking these medications    Details   hydrocortisone (ANUSOL-HC) 2.5 % rectal cream Apply topically 2 (two) times a day, Starting Thu 9/21/2023, Normal      Lidocaine, Anorectal, 5 % CREA Apply topically 3 (three) times a day as needed (pain), Starting Thu 9/21/2023, Normal         CONTINUE these medications which have NOT CHANGED    Details   albuterol (PROVENTIL HFA,VENTOLIN HFA) 90 mcg/act inhaler Inhale every 4 (four) hours as needed, Historical Med      ALPRAZolam (XANAX) 0.25 mg tablet Take 1 tablet (0.25 mg total) by mouth daily at bedtime as needed for anxiety, Starting Fri 2/10/2023, Normal      azelastine (ASTELIN) 0.1 % nasal spray 2 sprays into each nostril as needed  , Starting Tue 6/27/2017, Historical Med      cetirizine (ZyrTEC) 10 mg tablet Take 1 tablet (10 mg total) by mouth daily, Starting Tue 3/13/2018, Until Tue 8/1/2023, No Print      escitalopram (LEXAPRO) 10 mg tablet Take 1.5 tablets (15 mg total) by mouth daily, Starting Fri 2/10/2023, Until Tue 8/1/2023, Normal      famotidine (PEPCID) 20 mg tablet Take 1 tablet (20 mg total) by mouth daily, Starting Fri 10/28/2022, Normal      GLUCOSAMINE-CHONDROITIN PO Take by mouth, Historical Med      hyoscyamine (LEVSIN/SL) 0.125 mg SL tablet Take 1 tablet (0.125 mg total) by mouth every 6 (six) hours as needed for cramping or diarrhea, Starting Mon 12/9/2019, Until Tue 8/1/2023 at 2359, Normal      Lactobacillus Rhamnosus, GG, (CULTURELLE PO) Take by mouth, Historical Med      lidocaine (Lidoderm) 5 % Apply 1 patch topically over 12 hours daily Remove & Discard patch within 12 hours or as directed by MD, Starting Tue 5/9/2023, Normal      !! methocarbamol (ROBAXIN) 500 mg tablet Take 1 tablet (500 mg total) by mouth 4 (four) times a day, Starting Tue 5/9/2023, Normal      !! methocarbamol (ROBAXIN) 500 mg tablet Take 1 tablet (500 mg total) by mouth daily at bedtime as needed for muscle spasms, Starting Sat 5/20/2023, Normal      metoprolol succinate (TOPROL-XL) 25 mg 24 hr tablet TAKE ONE TABLET BY MOUTH EVERY DAY, Normal      multivitamin (THERAGRAN) TABS Take 1 tablet by mouth daily, Historical Med      naproxen (Naprosyn) 500 mg tablet Take 1 tablet (500 mg total) by mouth 2 (two) times a day with meals, Starting Tue 5/9/2023, Normal      omeprazole (PriLOSEC) 20 mg delayed release capsule Take 1 capsule (20 mg total) by mouth daily, Starting Fri 10/28/2022, Normal       !! - Potential duplicate medications found. Please discuss with provider. No discharge procedures on file.     PDMP Review       Value Time User    PDMP Reviewed  Yes 2/10/2023  8:54 AM Zachariah Mai PA-C          ED Provider  Electronically Signed by           Hannah Suazo., DO  09/21/23 1522

## 2023-09-22 ENCOUNTER — TELEPHONE (OUTPATIENT)
Dept: PSYCHIATRY | Facility: CLINIC | Age: 38
End: 2023-09-22

## 2023-09-22 NOTE — TELEPHONE ENCOUNTER
Patient is calling regarding cancelling an appointment.     Date/Time: 9/27 @ 9am  Reason: pt starting new job will call back when she availibility    Patient was rescheduled: YES [] NO [x]  If yes, when was Patient reschedule for:     Patient requesting call back to reschedule: YES [] NO [x]

## 2023-10-23 ENCOUNTER — OFFICE VISIT (OUTPATIENT)
Dept: FAMILY MEDICINE CLINIC | Facility: CLINIC | Age: 38
End: 2023-10-23
Payer: COMMERCIAL

## 2023-10-23 VITALS
WEIGHT: 177 LBS | SYSTOLIC BLOOD PRESSURE: 110 MMHG | HEIGHT: 68 IN | BODY MASS INDEX: 26.83 KG/M2 | HEART RATE: 80 BPM | TEMPERATURE: 98.4 F | DIASTOLIC BLOOD PRESSURE: 84 MMHG

## 2023-10-23 DIAGNOSIS — Z23 ENCOUNTER FOR IMMUNIZATION: ICD-10-CM

## 2023-10-23 DIAGNOSIS — J45.21 MILD INTERMITTENT ASTHMA WITH ACUTE EXACERBATION: Primary | ICD-10-CM

## 2023-10-23 DIAGNOSIS — K62.5 RECTAL BLEEDING: ICD-10-CM

## 2023-10-23 PROCEDURE — 90472 IMMUNIZATION ADMIN EACH ADD: CPT | Performed by: FAMILY MEDICINE

## 2023-10-23 PROCEDURE — 90677 PCV20 VACCINE IM: CPT | Performed by: FAMILY MEDICINE

## 2023-10-23 PROCEDURE — 90686 IIV4 VACC NO PRSV 0.5 ML IM: CPT | Performed by: FAMILY MEDICINE

## 2023-10-23 PROCEDURE — 99213 OFFICE O/P EST LOW 20 MIN: CPT | Performed by: FAMILY MEDICINE

## 2023-10-23 PROCEDURE — 90471 IMMUNIZATION ADMIN: CPT | Performed by: FAMILY MEDICINE

## 2023-10-23 RX ORDER — ALBUTEROL SULFATE 90 UG/1
2 AEROSOL, METERED RESPIRATORY (INHALATION) EVERY 4 HOURS PRN
Qty: 18 G | Refills: 11 | Status: SHIPPED | OUTPATIENT
Start: 2023-10-23

## 2023-10-23 NOTE — ASSESSMENT & PLAN NOTE
Some minor wheezing noted on the right lung today. Agree with albuterol. Sent prescription. Patient to follow-up if she continues to have more issues. Would recommend consider Prevnar vaccination, flu vaccination, COVID vaccination.

## 2023-10-23 NOTE — PATIENT INSTRUCTIONS
1. Mild intermittent asthma with acute exacerbation  Assessment & Plan:  Some minor wheezing noted on the right lung today. Agree with albuterol. Sent prescription. Patient to follow-up if she continues to have more issues. Would recommend consider Prevnar vaccination, flu vaccination, COVID vaccination. Orders:  -     albuterol (PROVENTIL HFA,VENTOLIN HFA) 90 mcg/act inhaler; Inhale 2 puffs every 4 (four) hours as needed for wheezing or shortness of breath    2. Rectal bleeding  Comments:  Per ER, likely internal hemorrhoids. Following with GI.    3. Encounter for immunization  Comments:  Flu vaccine, pneumonia vaccine for asthma. Discussed about COVID vaccination, and recommend wait 2 weeks. Orders:  -     influenza vaccine, quadrivalent, 0.5 mL, preservative-free, for adult and pediatric patients 6 mos+ (AFLURIA, FLUARIX, FLULAVAL, FLUZONE)  -     Pneumococcal Conjugate Vaccine 20-valent (Pcv20)        COVID 19 Instructions    Marisa Dewitt was advised to limit contact with others to essential tasks such as getting food, medications, and medical care. Proper handwashing reviewed, and Hand sanitzer when washing is not available. If the patient develops symptoms of COVID 19, the patient should call the office as soon as possible. It is strongly recommended that Flu Vaccinations be obtained. Virtual Visits:  [place]: This works on smart phones (any phone with Internet browsing capability). You should get a text message when the provider is ready to see you. Click on the link in the text message, and the call should start. You will need to type in your name, and allow camera and microphone access. This is HIPPA compliant, and secure. If you have not already done so, get immunized to COVID 19. We are committed to getting you vaccinated as soon as possible and will be closely following CDC and [facility] guidelines as they are released and revised.   Please refer to our COVID-19 vaccine webpage for the most up to date information on the vaccine and our distribution efforts. This site will also have the most up to date recommendations for COVID booster vaccine. [place]    Call [phone] ([phone]), option 7    You can also visit [email or web address] to find vaccines in your area. OUR LOCATION:    [facility]  [address] [facility], [address] [place] [address], [address] [place] [address]  Fax: [phone]    Lab services, Rheumatology, and OB/GYN are at this location as well.

## 2023-10-23 NOTE — PROGRESS NOTES
Name: Alex Ding      : 1985      MRN: [id number]  Encounter Provider: Charmayne Fruits, MD  Encounter Date: 10/23/2023   Encounter department: [place] [address] [address] [facility]     1. Mild intermittent asthma with acute exacerbation  Assessment & Plan:  Some minor wheezing noted on the right lung today. Agree with albuterol. Sent prescription. Patient to follow-up if she continues to have more issues. Would recommend consider Prevnar vaccination, flu vaccination, COVID vaccination. Orders:  -     albuterol (PROVENTIL HFA,VENTOLIN HFA) 90 mcg/act inhaler; Inhale 2 puffs every 4 (four) hours as needed for wheezing or shortness of breath    2. Rectal bleeding  Comments:  Per ER, likely internal hemorrhoids. Following with GI.    3. Encounter for immunization  Comments:  Flu vaccine, pneumonia vaccine for asthma. Discussed about COVID vaccination, and recommend wait 2 weeks. Orders:  -     influenza vaccine, quadrivalent, 0.5 mL, preservative-free, for adult and pediatric patients 6 mos+ (AFLURIA, FLUARIX, FLULAVAL, FLUZONE)  -     Pneumococcal Conjugate Vaccine 20-valent (Pcv20)           Subjective      Chief Complaint   Patient presents with   • Nasal Congestion   • Nasal Drainage     Started with PND 2 weeks ago. Can still feel inflammation. She did not test for Covid. • Flu Vaccine       Patient's been having increasing cough, and initially started with some postnasal drip and irritation. She wondered if an albuterol inhaler would help. Does have history of asthma. Has not needed to use anything on a regular basis. No other particular issues or concerns.         Review of Systems    Current Outpatient Medications on File Prior to Visit   Medication Sig   • azelastine (ASTELIN) 0.1 % nasal spray 2 sprays into each nostril as needed     • famotidine (PEPCID) 20 mg tablet Take 1 tablet (20 mg total) by mouth daily (Patient taking differently: Take 20 mg by mouth daily at bedtime)   • hydrocortisone (ANUSOL-HC) 2.5 % rectal cream Apply topically 2 (two) times a day   • Lactobacillus Rhamnosus, GG, (CULTURELLE PO) Take by mouth   • Lidocaine, Anorectal, 5 % CREA Apply topically 3 (three) times a day as needed (pain)   • metoprolol succinate (TOPROL-XL) 25 mg 24 hr tablet TAKE ONE TABLET BY MOUTH EVERY DAY   • multivitamin (THERAGRAN) TABS Take 1 tablet by mouth daily   • omeprazole (PriLOSEC) 20 mg delayed release capsule Take 1 capsule (20 mg total) by mouth daily   • [DISCONTINUED] albuterol (PROVENTIL HFA,VENTOLIN HFA) 90 mcg/act inhaler Inhale every 4 (four) hours as needed   • cetirizine (ZyrTEC) 10 mg tablet Take 1 tablet (10 mg total) by mouth daily (Patient taking differently: Take 10 mg by mouth as needed)   • escitalopram (LEXAPRO) 10 mg tablet Take 1.5 tablets (15 mg total) by mouth daily   • hyoscyamine (LEVSIN/SL) 0.125 mg SL tablet Take 1 tablet (0.125 mg total) by mouth every 6 (six) hours as needed for cramping or diarrhea   • [DISCONTINUED] ALPRAZolam (XANAX) 0.25 mg tablet Take 1 tablet (0.25 mg total) by mouth daily at bedtime as needed for anxiety (Patient not taking: Reported on 10/23/2023)   • [DISCONTINUED] GLUCOSAMINE-CHONDROITIN PO Take by mouth (Patient not taking: Reported on 10/23/2023)   • [DISCONTINUED] lidocaine (Lidoderm) 5 % Apply 1 patch topically over 12 hours daily Remove & Discard patch within 12 hours or as directed by MD (Patient not taking: Reported on 5/20/2023)   • [DISCONTINUED] methocarbamol (ROBAXIN) 500 mg tablet Take 1 tablet (500 mg total) by mouth 4 (four) times a day (Patient not taking: Reported on 7/3/2023)   • [DISCONTINUED] methocarbamol (ROBAXIN) 500 mg tablet Take 1 tablet (500 mg total) by mouth daily at bedtime as needed for muscle spasms (Patient not taking: Reported on 7/3/2023)   • [DISCONTINUED] naproxen (Naprosyn) 500 mg tablet Take 1 tablet (500 mg total) by mouth 2 (two) times a day with meals (Patient not taking: Reported on 5/20/2023)       Objective     /84   Pulse 80   Temp 98.4 °F (36.9 °C)   Ht 5' 8" (1.727 m)   Wt 80.3 kg (177 lb)   BMI 26.91 kg/m²     Physical Exam  Lina Nettles MD

## 2023-11-27 ENCOUNTER — TELEPHONE (OUTPATIENT)
Dept: BEHAVIORAL/MENTAL HEALTH CLINIC | Facility: CLINIC | Age: 38
End: 2023-11-27

## 2023-11-27 NOTE — TELEPHONE ENCOUNTER
PT called regarding recent events that have been very stressful including the potential diagnosis of MS for her . Scheduled a session for 12/12.

## 2023-12-01 ENCOUNTER — HOSPITAL ENCOUNTER (OUTPATIENT)
Dept: ULTRASOUND IMAGING | Facility: CLINIC | Age: 38
Discharge: HOME | End: 2023-12-01
Payer: COMMERCIAL

## 2023-12-01 DIAGNOSIS — R92.8 ABNORMAL FINDING ON BREAST IMAGING: ICD-10-CM

## 2023-12-01 PROCEDURE — 76642 ULTRASOUND BREAST LIMITED: CPT

## 2023-12-12 ENCOUNTER — TELEMEDICINE (OUTPATIENT)
Dept: BEHAVIORAL/MENTAL HEALTH CLINIC | Facility: CLINIC | Age: 38
End: 2023-12-12
Payer: COMMERCIAL

## 2023-12-12 DIAGNOSIS — F32.9 MAJOR DEPRESSIVE DISORDER WITH CURRENT ACTIVE EPISODE, UNSPECIFIED DEPRESSION EPISODE SEVERITY, UNSPECIFIED WHETHER RECURRENT: ICD-10-CM

## 2023-12-12 DIAGNOSIS — F41.1 GENERALIZED ANXIETY DISORDER: Primary | ICD-10-CM

## 2023-12-12 PROCEDURE — 90834 PSYTX W PT 45 MINUTES: CPT | Performed by: SOCIAL WORKER

## 2023-12-18 NOTE — PSYCH
Virtual Regular Visit    Verification of patient location:    Patient is located at Home in the following state in which I hold an active license [place]      Assessment/Plan:    Problem List Items Addressed This Visit          Other    Anxiety disorder - Primary    Depression       Goals addressed in session: Goal 1          Reason for visit is   Chief Complaint   Patient presents with    Virtual Regular Visit          Encounter provider Claribel Denton    Provider located at [facility] [place]  [facility] [place] [id number] [phone]      Recent Visits  Date Type Provider Dept   12/12/23 Telemedicine Claribel Denton [facility] [place]   Showing recent visits within past 7 days and meeting all other requirements  Future Appointments  No visits were found meeting these conditions.  Showing future appointments within next 150 days and meeting all other requirements       The patient was identified by name and date of birth. Candi Marshall was informed that this is a telemedicine visit and that the visit is being conducted through the Epic Embedded platform. She agrees to proceed..  My office door was closed. No one else was in the room.  She acknowledged consent and understanding of privacy and security of the video platform. The patient has agreed to participate and understands they can discontinue the visit at any time.    Patient is aware this is a billable service.           HPI     Past Medical History:   Diagnosis Date    Anxiety     Asthma     hx-infrequent as adult    Back pain     history of sports injuries    Depression     Eating disorder     GERD (gastroesophageal reflux disease)     PONV (postoperative nausea and vomiting)        Past Surgical History:   Procedure Laterality Date    BREAST BIOPSY Right 06/06/2023    BREAST LUMPECTOMY Right 8/4/2023    Procedure: LUMPECTOMY BREAST TONYA  Daily Note     Today's date: 2023  Patient name: Tang Robrets  : 2024  MRN: [id number]  Referring provider: GENET Waite  LOCALIZED;  Surgeon: Tova Childs MD;  Location: [facility];  Service: Surgical Oncology    EGD      US BREAST NEEDLE LOC LEFT Left 05/09/2012    US BREAST TONYA  NEEDLE LOC RIGHT Right 07/14/2023    US GUIDED BREAST BIOPSY RIGHT COMPLETE Right 06/06/2023    WISDOM TOOTH EXTRACTION         Current Outpatient Medications   Medication Sig Dispense Refill    albuterol (PROVENTIL HFA,VENTOLIN HFA) 90 mcg/act inhaler Inhale 2 puffs every 4 (four) hours as needed for wheezing or shortness of breath 18 g 11    azelastine (ASTELIN) 0.1 % nasal spray 2 sprays into each nostril as needed        cetirizine (ZyrTEC) 10 mg tablet Take 1 tablet (10 mg total) by mouth daily (Patient taking differently: Take 10 mg by mouth as needed) 90 tablet 3    escitalopram (LEXAPRO) 10 mg tablet Take 1.5 tablets (15 mg total) by mouth daily 135 tablet 3    famotidine (PEPCID) 20 mg tablet Take 1 tablet (20 mg total) by mouth daily (Patient taking differently: Take 20 mg by mouth daily at bedtime) 90 tablet 3    hydrocortisone (ANUSOL-HC) 2.5 % rectal cream Apply topically 2 (two) times a day 28 g 0    hyoscyamine (LEVSIN/SL) 0.125 mg SL tablet Take 1 tablet (0.125 mg total) by mouth every 6 (six) hours as needed for cramping or diarrhea 60 tablet 5    Lactobacillus Rhamnosus, GG, (CULTURELLE PO) Take by mouth      Lidocaine, Anorectal, 5 % CREA Apply topically 3 (three) times a day as needed (pain) 113 g 0    metoprolol succinate (TOPROL-XL) 25 mg 24 hr tablet TAKE ONE TABLET BY MOUTH EVERY DAY 90 tablet 3    multivitamin (THERAGRAN) TABS Take 1 tablet by mouth daily      omeprazole (PriLOSEC) 20 mg delayed release capsule Take 1 capsule (20 mg total) by mouth daily 90 capsule 3     No current facility-administered medications for this visit.        Allergies   Allergen Reactions    Bee Venom Rash     Cellulitis    Codeine      Other reaction(s): Nausea and/or vomiting    Oxycodone-Acetaminophen Vomiting       Review of Systems    Video  "Exam    There were no vitals filed for this visit.    Physical Exam     Behavioral Health Psychotherapy Progress Note    Psychotherapy Provided: Individual Psychotherapy     1. Generalized anxiety disorder        2. Major depressive disorder with current active episode, unspecified depression episode severity, unspecified whether recurrent            Goals addressed in session: Goal 1     DATA: Keiry stated that things have been very challenging with her 's health issues. She stated that they have ruled out MS but that he is being tested for many other conditions at this time. 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 She stated that he can be very stubborn with his care. Discussing his symptoms and ways to navigate this with him. Giving supportive therapy  During this session, this clinician used the following therapeutic modalities: Solution-Focused Therapy and Supportive Psychotherapy    Substance Abuse was not addressed during this session. If the client is diagnosed with a co-occurring substance use disorder, please indicate any changes in the frequency or amount of use: N/A. Stage of change for addressing substance use diagnoses: No substance use/Not applicable    ASSESSMENT:  Candi Marshall presents with a Anxious mood.     her affect is Normal range and intensity, which is congruent, with her mood and the content of the session. The client has made progress on their goals.    Continues to manage her stress appropriately Candi Marshall presents with a none risk of suicide, none risk of self-harm, and none risk of harm to others.    For any risk assessment that surpasses a \"low\" rating, a safety plan must be developed.    A safety plan was indicated: no  If yes, describe in detail N/A    PLAN: Between sessions, Candi Marshall will continue to manage her stress " appropriately. At the next session, the therapist will use Solution-Focused Therapy and Supportive Psychotherapy to address treatment goals.    Behavioral Health Treatment Plan and Discharge Planning: Candi Marshall is aware of and agrees to continue to work on their treatment plan. They have identified and are working toward their discharge goals. yes    Visit start and stop times:    12/12/23  Start Time: 1204  Stop Time: 1249  Total Visit Time: 45 minutes

## 2024-01-15 ENCOUNTER — APPOINTMENT (OUTPATIENT)
Dept: LAB | Facility: CLINIC | Age: 39
End: 2024-01-15
Payer: COMMERCIAL

## 2024-01-15 ENCOUNTER — TELEPHONE (OUTPATIENT)
Dept: BEHAVIORAL/MENTAL HEALTH CLINIC | Facility: CLINIC | Age: 39
End: 2024-01-15

## 2024-01-15 DIAGNOSIS — R10.9 STOMACH ACHE: ICD-10-CM

## 2024-01-15 LAB
BUN SERPL-MCNC: 10 MG/DL (ref 5–25)
CREAT SERPL-MCNC: 0.62 MG/DL (ref 0.6–1.3)
GFR SERPL CREATININE-BSD FRML MDRD: 113 ML/MIN/1.73SQ M

## 2024-01-15 PROCEDURE — 82565 ASSAY OF CREATININE: CPT

## 2024-01-15 PROCEDURE — 36415 COLL VENOUS BLD VENIPUNCTURE: CPT

## 2024-01-15 PROCEDURE — 84520 ASSAY OF UREA NITROGEN: CPT

## 2024-01-19 ENCOUNTER — HOSPITAL ENCOUNTER (OUTPATIENT)
Dept: CT IMAGING | Facility: HOSPITAL | Age: 39
End: 2024-01-19
Payer: COMMERCIAL

## 2024-01-19 ENCOUNTER — OFFICE VISIT (OUTPATIENT)
Dept: FAMILY MEDICINE CLINIC | Facility: CLINIC | Age: 39
End: 2024-01-19
Payer: COMMERCIAL

## 2024-01-19 VITALS
BODY MASS INDEX: 26.98 KG/M2 | HEIGHT: 68 IN | TEMPERATURE: 98.9 F | HEART RATE: 83 BPM | OXYGEN SATURATION: 98 % | WEIGHT: 178 LBS | SYSTOLIC BLOOD PRESSURE: 120 MMHG | DIASTOLIC BLOOD PRESSURE: 90 MMHG

## 2024-01-19 DIAGNOSIS — R10.9 STOMACH ACHE: ICD-10-CM

## 2024-01-19 DIAGNOSIS — J18.9 COMMUNITY ACQUIRED PNEUMONIA OF LEFT UPPER LOBE OF LUNG: ICD-10-CM

## 2024-01-19 DIAGNOSIS — J45.21 MILD INTERMITTENT ASTHMA WITH ACUTE EXACERBATION: Primary | ICD-10-CM

## 2024-01-19 PROBLEM — Z86.16 HISTORY OF COVID-19: Status: RESOLVED | Noted: 2022-04-22 | Resolved: 2024-01-19

## 2024-01-19 PROCEDURE — 74177 CT ABD & PELVIS W/CONTRAST: CPT

## 2024-01-19 PROCEDURE — G1004 CDSM NDSC: HCPCS

## 2024-01-19 PROCEDURE — 99213 OFFICE O/P EST LOW 20 MIN: CPT | Performed by: FAMILY MEDICINE

## 2024-01-19 RX ORDER — BENZONATATE 200 MG/1
200 CAPSULE ORAL 3 TIMES DAILY PRN
Qty: 20 CAPSULE | Refills: 0 | Status: SHIPPED | OUTPATIENT
Start: 2024-01-19

## 2024-01-19 RX ORDER — BUDESONIDE AND FORMOTEROL FUMARATE DIHYDRATE 160; 4.5 UG/1; UG/1
2 AEROSOL RESPIRATORY (INHALATION) 2 TIMES DAILY
Qty: 10.2 G | Refills: 5 | Status: SHIPPED | OUTPATIENT
Start: 2024-01-19

## 2024-01-19 RX ORDER — SODIUM, POTASSIUM,MAG SULFATES 17.5-3.13G
SOLUTION, RECONSTITUTED, ORAL ORAL
COMMUNITY
Start: 2023-12-11

## 2024-01-19 RX ORDER — AZITHROMYCIN 250 MG/1
TABLET, FILM COATED ORAL
Qty: 6 TABLET | Refills: 0 | Status: SHIPPED | OUTPATIENT
Start: 2024-01-19 | End: 2024-01-24

## 2024-01-19 RX ADMIN — IOHEXOL 100 ML: 350 INJECTION, SOLUTION INTRAVENOUS at 09:00

## 2024-01-19 NOTE — PROGRESS NOTES
Name: Candi Marshall      : 1985      MRN: [id number]  Encounter Provider: Rick Moore MD  Encounter Date: 2024   Encounter department: [facility] PR[facility]    Assessment & Plan     1. Mild intermittent asthma with acute exacerbation  Assessment & Plan:  History of asthma, with likely acute exacerbation due to community-acquired pneumonia.  Add Symbicort.  Continue albuterol.  Could consider Singulair at some point.  No other change at the moment.    Orders:  -     budesonide-formoterol (SYMBICORT) 160-4.5 mcg/act inhaler; Inhale 2 puffs 2 (two) times a day Rinse mouth after use.    2. Community acquired pneumonia of left upper lobe of lung  Comments:  Zithromax.  Add Symbicort secondary to asthma.  Mucinex to continue.  Tessalon as needed.  Orders:  -     azithromycin (ZITHROMAX) 250 mg tablet; Take 2 tablets on day 1, then 1 tablet daily days 2 through 5  -     budesonide-formoterol (SYMBICORT) 160-4.5 mcg/act inhaler; Inhale 2 puffs 2 (two) times a day Rinse mouth after use.  -     benzonatate (TESSALON) 200 MG capsule; Take 1 capsule (200 mg total) by mouth 3 (three) times a day as needed for cough         Subjective      Chief Complaint   Patient presents with   • URI     Sx started about 2*3 weeks ago. Chest pain after coughing, congestion. COVID test done 2 days ago-Negative. OTC meds not helping       Patient is had several weeks now of symptoms.  Getting worse at times.  Cough quite bothersome.  Using inhalers, which helps a little bit.  Has done COVID test frequently, and all have been negative.  History of asthma.  Currently using just albuterol.          Review of Systems   Constitutional:  Positive for fatigue.   HENT:  Positive for congestion and postnasal drip.    Respiratory:  Positive for cough and wheezing.    Cardiovascular: Negative.    Gastrointestinal: Negative.        Current Outpatient Medications on File Prior to Visit   Medication Sig   •  "albuterol (PROVENTIL HFA,VENTOLIN HFA) 90 mcg/act inhaler Inhale 2 puffs every 4 (four) hours as needed for wheezing or shortness of breath   • azelastine (ASTELIN) 0.1 % nasal spray 2 sprays into each nostril as needed     • famotidine (PEPCID) 20 mg tablet Take 1 tablet (20 mg total) by mouth daily (Patient taking differently: Take 20 mg by mouth daily at bedtime)   • hydrocortisone (ANUSOL-HC) 2.5 % rectal cream Apply topically 2 (two) times a day   • Lactobacillus Rhamnosus, GG, (CULTURELLE PO) Take by mouth   • Lidocaine, Anorectal, 5 % CREA Apply topically 3 (three) times a day as needed (pain)   • metoprolol succinate (TOPROL-XL) 25 mg 24 hr tablet TAKE ONE TABLET BY MOUTH EVERY DAY   • multivitamin (THERAGRAN) TABS Take 1 tablet by mouth daily   • omeprazole (PriLOSEC) 20 mg delayed release capsule Take 1 capsule (20 mg total) by mouth daily   • cetirizine (ZyrTEC) 10 mg tablet Take 1 tablet (10 mg total) by mouth daily (Patient taking differently: Take 10 mg by mouth as needed)   • escitalopram (LEXAPRO) 10 mg tablet Take 1.5 tablets (15 mg total) by mouth daily   • hyoscyamine (LEVSIN/SL) 0.125 mg SL tablet Take 1 tablet (0.125 mg total) by mouth every 6 (six) hours as needed for cramping or diarrhea   • Na Sulfate-K Sulfate-Mg Sulf 17.5-3.13-1.6 GM/177ML SOLN PLEASE FOLLOW INSTRUCTIONS PROVIDED BY PHYSICIAN'S OFFICE (Patient not taking: Reported on 1/19/2024)       Objective     /90 (BP Location: Left arm, Patient Position: Sitting, Cuff Size: Standard)   Pulse 83   Temp 98.9 °F (37.2 °C) (Oral)   Ht 5' 8\" (1.727 m)   Wt 80.7 kg (178 lb)   SpO2 98%   BMI 27.06 kg/m²     Physical Exam  Vitals and nursing note reviewed.   Constitutional:       Appearance: Normal appearance. She is well-developed.   HENT:      Head: Normocephalic and atraumatic.   Cardiovascular:      Rate and Rhythm: Normal rate and regular rhythm.      Pulses:           Carotid pulses are 2+ on the right side and 2+ on the left " side.     Heart sounds: Normal heart sounds.   Pulmonary:      Effort: Pulmonary effort is normal.      Breath sounds: Wheezing present. No rales.       Chest:      Chest wall: No tenderness.   Neurological:      Mental Status: She is alert.       Rick Moore MD

## 2024-01-19 NOTE — ASSESSMENT & PLAN NOTE
History of asthma, with likely acute exacerbation due to community-acquired pneumonia.  Add Symbicort.  Continue albuterol.  Could consider Singulair at some point.  No other change at the moment.

## 2024-01-19 NOTE — PATIENT INSTRUCTIONS
1. Mild intermittent asthma with acute exacerbation  Assessment & Plan:  History of asthma, with likely acute exacerbation due to community-acquired pneumonia.  Add Symbicort.  Continue albuterol.  Could consider Singulair at some point.  No other change at the moment.    Orders:  -     budesonide-formoterol (SYMBICORT) 160-4.5 mcg/act inhaler; Inhale 2 puffs 2 (two) times a day Rinse mouth after use.    2. Community acquired pneumonia of left upper lobe of lung  Comments:  Zithromax.  Add Symbicort secondary to asthma.  Mucinex to continue.  Tessalon as needed.  Orders:  -     azithromycin (ZITHROMAX) 250 mg tablet; Take 2 tablets on day 1, then 1 tablet daily days 2 through 5  -     budesonide-formoterol (SYMBICORT) 160-4.5 mcg/act inhaler; Inhale 2 puffs 2 (two) times a day Rinse mouth after use.  -     benzonatate (TESSALON) 200 MG capsule; Take 1 capsule (200 mg total) by mouth 3 (three) times a day as needed for cough        COVID 19 Instructions    Candi Marshall was advised to limit contact with others to essential tasks such as getting food, medications, and medical care.    Proper handwashing reviewed, and Hand sanitzer when washing is not available.    If the patient develops symptoms of COVID 19, the patient should call the office as soon as possible.    It is strongly recommended that Flu Vaccinations be obtained.      Virtual Visits:  [place]: This works on smart phones (any phone with Internet browsing capability).  You should get a text message when the provider is ready to see you.  Click on the link in the text message, and the call should start.  You will need to type in your name, and allow camera and microphone access.  This is HIPPA compliant, and secure.      If you have not already done so, get immunized to COVID 19.      We are committed to getting you vaccinated as soon as possible and will be closely following CDC and [facility] guidelines as they are released and revised.   Please refer to our COVID-19 vaccine webpage for the most up to date information on the vaccine and our distribution efforts.    This site will also have the most up to date recommendations for COVID booster vaccine.    [email or web address]    Call [phone] ([phone]), option 7    You can also visit https://www.vaccines.gov/ to find vaccines in your area.    OUR LOCATION:    On license of [facility]  [address] [place] [address], [address]  [place], [place], [address]  [phone]  Fax: [phone]    Lab services, Rheumatology, and OB/GYN are at this location as well.

## 2024-01-23 ENCOUNTER — RA CDI HCC (OUTPATIENT)
Dept: OTHER | Facility: HOSPITAL | Age: 39
End: 2024-01-23

## 2024-01-30 ENCOUNTER — TELEPHONE (OUTPATIENT)
Age: 39
End: 2024-01-30

## 2024-01-30 ENCOUNTER — HOSPITAL ENCOUNTER (OUTPATIENT)
Dept: CT IMAGING | Facility: HOSPITAL | Age: 39
Discharge: HOME | End: 2024-01-30
Payer: COMMERCIAL

## 2024-01-30 DIAGNOSIS — R91.1 PULMONARY NODULE SEEN ON IMAGING STUDY: Primary | ICD-10-CM

## 2024-01-30 DIAGNOSIS — R91.1 PULMONARY NODULE SEEN ON IMAGING STUDY: ICD-10-CM

## 2024-01-30 PROCEDURE — 71250 CT THORAX DX C-: CPT

## 2024-01-30 PROCEDURE — G1004 CDSM NDSC: HCPCS

## 2024-01-30 NOTE — LETTER
[facility]  [address] [place] [address]      February 2, 2024    MRN: [id number]     Phone: [phone]     Dear Ms. Marshall,    You recently had a(n) Cat Scan performed on 1/30/2024 at  [facility] that was requested by Rick Moore MD. The study was reviewed by a radiologist, which is a physician who specializes in medical imaging. The radiologist issued a report describing his or her findings. In that report there was a finding that the radiologist felt warranted further discussion with your health care provider and that discussion would be beneficial to you.      The results were sent to Rick Moore MD on 01/30/2024  2:03 PM. We recommend that you contact Rick Moore MD at [phone] or set up an appointment to discuss the results of the imaging test. If you have already heard from Rick Moore MD regarding the results of your study, you can disregard this letter.     This letter is not meant to alarm you, but intended to encourage you to follow-up on your results with the provider that sent you for the imaging study. In addition, we have enclosed answers to frequently asked questions by other patients who have also received a letter to review results with their health care provider (see page two).      Thank you for choosing [facility] for your medical imaging needs.                                                                                                                                                        FREQUENTLY ASKED QUESTIONS    Why am I receiving this letter?  [place] State Law requires us to notify patients who have findings on imaging exams that may require more testing or follow-up with a health professional within the next 3 months.        How serious is the finding on the imaging test?  This letter is sent to all patients who may need follow-up or more  testing within the next 3 months.  Receiving this letter does not necessarily mean you have a life-threatening imaging finding or disease.  Recommendations in the radiologist’s imaging report are general in nature and it is up to your healthcare provider to say whether those recommendations make sense for your situation.  You are strongly encouraged to talk to your health care provider about the results and ask whether additional steps need to be taken.    Where can I get a copy of the final report for my recent radiology exam?  To get a full copy of the report you can access your records online at [email or web address] or please contact [facility] Medical Records Department at [phone] Monday through Friday between 8 am and 6 pm.         What do I need to do now?           Please contact your health care provider who requested the imaging study to discuss what further actions (if any) are needed.  You may have already heard from (your ordering provider) in regard to this test in which case you can disregard this letter.        NOTICE IN ACCORDANCE WITH THE [place] STATE “PATIENT TEST RESULT INFORMATION ACT OF 2018”    You are receiving this notice as a result of a determination by your diagnostic imaging service that further discussions of your test results are warranted and would be beneficial to you.    The complete results of your test or tests have been or will be sent to the health care practitioner that ordered the test or tests. It is recommended that you contact your health care practitioner to discuss your results as soon as possible.

## 2024-01-30 NOTE — RESULT ENCOUNTER NOTE
Tests were not normal, and should follow at  your scheduled Office visit. Please call about this, if Merlin Diamonds message is not available or not read by patient.

## 2024-02-02 ENCOUNTER — OFFICE VISIT (OUTPATIENT)
Dept: FAMILY MEDICINE CLINIC | Facility: CLINIC | Age: 39
End: 2024-02-02
Payer: COMMERCIAL

## 2024-02-02 VITALS
BODY MASS INDEX: 26.98 KG/M2 | HEIGHT: 68 IN | DIASTOLIC BLOOD PRESSURE: 86 MMHG | WEIGHT: 178 LBS | OXYGEN SATURATION: 98 % | SYSTOLIC BLOOD PRESSURE: 120 MMHG | HEART RATE: 86 BPM

## 2024-02-02 DIAGNOSIS — S22.31XA CLOSED FRACTURE OF ONE RIB OF RIGHT SIDE, INITIAL ENCOUNTER: ICD-10-CM

## 2024-02-02 DIAGNOSIS — R73.9 HYPERGLYCEMIA: ICD-10-CM

## 2024-02-02 DIAGNOSIS — K58.0 IRRITABLE BOWEL SYNDROME WITH DIARRHEA: ICD-10-CM

## 2024-02-02 DIAGNOSIS — J45.21 MILD INTERMITTENT ASTHMA WITH ACUTE EXACERBATION: ICD-10-CM

## 2024-02-02 DIAGNOSIS — J18.9 COMMUNITY ACQUIRED PNEUMONIA OF LEFT UPPER LOBE OF LUNG: ICD-10-CM

## 2024-02-02 DIAGNOSIS — K42.9 UMBILICAL HERNIA WITHOUT OBSTRUCTION AND WITHOUT GANGRENE: ICD-10-CM

## 2024-02-02 DIAGNOSIS — R91.1 PULMONARY NODULE SEEN ON IMAGING STUDY: Primary | ICD-10-CM

## 2024-02-02 PROBLEM — S22.39XA RIB FRACTURE: Status: ACTIVE | Noted: 2024-02-02

## 2024-02-02 PROCEDURE — 99214 OFFICE O/P EST MOD 30 MIN: CPT | Performed by: FAMILY MEDICINE

## 2024-02-02 NOTE — ASSESSMENT & PLAN NOTE
Umbilical hernia is noted.  If she continues to have some discomfort or irritation with this, would recommend follow-up with general surgeon.

## 2024-02-02 NOTE — ASSESSMENT & PLAN NOTE
Hyperglycemia was noted previously in 2022.  Repeat that was done in 2023 showed 80 blood sugar fasting.  Can check with her next blood work, but nothing in particular going on with this.  Will resolve diagnosis.

## 2024-02-02 NOTE — PROGRESS NOTES
Name: Candi Marshall      : 1985      MRN: [id number]  Encounter Provider: Rick Moore MD  Encounter Date: 2024   Encounter department: [facility]    Assessment & Plan     1. Pulmonary nodule seen on imaging study    2. Mild intermittent asthma with acute exacerbation  Assessment & Plan:  Patient doing much better at this point.  Not really having significant problems or issues.      3. Community acquired pneumonia of left upper lobe of lung    4. Closed fracture of one rib of right side, initial encounter  Assessment & Plan:  No specific treatments other than continued deep breathing.  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      5. Umbilical hernia without obstruction and without gangrene  Assessment & Plan:  Umbilical hernia is noted.  If she continues to have some discomfort or irritation with this, would recommend follow-up with general surgeon.      6. Hyperglycemia  Assessment & Plan:  Hyperglycemia was noted previously in .  Repeat that was done in  showed 80 blood sugar fasting.  Can check with her next blood work, but nothing in particular going on with this.  Will resolve diagnosis.      7. Irritable bowel syndrome with diarrhea  Assessment & Plan:  Patient following with gastroenterology.  CT scan abdomen was performed.  Follow-up per GI.             Subjective      Patient is here to follow-up on several issues.    She did have CT of the abdomen for abdominal discomfort, more specifically diarrhea that is been an ongoing problem.  She also has had some periumbilical discomfort, and felt that there was a hernia.  Please see the results of the CT of the abdomen.  That also found pulmonary nodules, and we therefore ordered a chest CT, which was normal as far  as pulmonary nodule is concerned.    Rib fracture: Noted on CT.          Review of Systems   Constitutional: Negative.    HENT: Negative.     Respiratory:  Positive for cough and shortness of breath.    Cardiovascular: Negative.    Gastrointestinal:  Positive for diarrhea.   Genitourinary: Negative.    Musculoskeletal:         Per HPI       Current Outpatient Medications on File Prior to Visit   Medication Sig   • albuterol (PROVENTIL HFA,VENTOLIN HFA) 90 mcg/act inhaler Inhale 2 puffs every 4 (four) hours as needed for wheezing or shortness of breath   • azelastine (ASTELIN) 0.1 % nasal spray 2 sprays into each nostril as needed     • benzonatate (TESSALON) 200 MG capsule Take 1 capsule (200 mg total) by mouth 3 (three) times a day as needed for cough   • budesonide-formoterol (SYMBICORT) 160-4.5 mcg/act inhaler Inhale 2 puffs 2 (two) times a day Rinse mouth after use.   • famotidine (PEPCID) 20 mg tablet Take 1 tablet (20 mg total) by mouth daily (Patient taking differently: Take 20 mg by mouth daily at bedtime)   • hydrocortisone (ANUSOL-HC) 2.5 % rectal cream Apply topically 2 (two) times a day   • Lactobacillus Rhamnosus, GG, (CULTURELLE PO) Take by mouth   • Lidocaine, Anorectal, 5 % CREA Apply topically 3 (three) times a day as needed (pain)   • metoprolol succinate (TOPROL-XL) 25 mg 24 hr tablet TAKE ONE TABLET BY MOUTH EVERY DAY   • multivitamin (THERAGRAN) TABS Take 1 tablet by mouth daily   • omeprazole (PriLOSEC) 20 mg delayed release capsule Take 1 capsule (20 mg total) by mouth daily   • cetirizine (ZyrTEC) 10 mg tablet Take 1 tablet (10 mg total) by mouth daily (Patient taking differently: Take 10 mg by mouth as needed)   • escitalopram (LEXAPRO) 10 mg tablet Take 1.5 tablets (15 mg total) by mouth daily   • hyoscyamine (LEVSIN/SL) 0.125 mg SL tablet Take 1 tablet (0.125 mg total) by mouth every 6 (six) hours as needed for cramping or diarrhea   • Na Sulfate-K Sulfate-Mg Sulf 17.5-3.13-1.6 GM/177ML  "SOLN PLEASE FOLLOW INSTRUCTIONS PROVIDED BY PHYSICIAN'S OFFICE (Patient not taking: Reported on 1/19/2024)       Objective     /86 (BP Location: Left arm, Patient Position: Sitting, Cuff Size: Standard)   Pulse 86   Ht 5' 8\" (1.727 m)   Wt 80.7 kg (178 lb)   SpO2 98%   BMI 27.06 kg/m²     Physical Exam  Vitals and nursing note reviewed.   Constitutional:       Appearance: Normal appearance. She is well-developed.   HENT:      Head: Normocephalic and atraumatic.   Cardiovascular:      Rate and Rhythm: Normal rate and regular rhythm.      Pulses:           Carotid pulses are 2+ on the right side and 2+ on the left side.     Heart sounds: Normal heart sounds.   Pulmonary:      Effort: Pulmonary effort is normal.      Breath sounds: Normal breath sounds. No wheezing or rales.   Chest:      Chest wall: No tenderness.   Neurological:      Mental Status: She is alert.       Rick Moore MD    "

## 2024-02-02 NOTE — ASSESSMENT & PLAN NOTE
No specific treatments other than continued deep breathing.  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

## 2024-02-02 NOTE — PATIENT INSTRUCTIONS
1. Pulmonary nodule seen on imaging study    2. Mild intermittent asthma with acute exacerbation  Assessment & Plan:  Patient doing much better at this point.  Not really having significant problems or issues.      3. Community acquired pneumonia of left upper lobe of lung    4. Closed fracture of one rib of right side, initial encounter  Assessment & Plan:  No specific treatments other than continued deep breathing.  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      5. Umbilical hernia without obstruction and without gangrene  Assessment & Plan:  Umbilical hernia is noted.  If she continues to have some discomfort or irritation with this, would recommend follow-up with general surgeon.      6. Hyperglycemia  Assessment & Plan:  Hyperglycemia was noted previously in 2022.  Repeat that was done in 2023 showed 80 blood sugar fasting.  Can check with her next blood work, but nothing in particular going on with this.  Will resolve diagnosis.      7. Irritable bowel syndrome with diarrhea  Assessment & Plan:  Patient following with gastroenterology.  CT scan abdomen was performed.  Follow-up per GI.          COVID 19 Instructions    Candi Marshall was advised to limit contact with others to essential tasks such as getting food, medications, and medical care.    Proper handwashing reviewed, and Hand sanitzer when washing is not available.    If the patient develops symptoms of COVID 19, the patient should call the office as soon as possible.    It is strongly recommended that Flu Vaccinations be obtained.      Virtual Visits:  Reid: This works on smart phones (any phone with Internet browsing capability).  You should get a text message when the provider is ready to see you.  Click on the link in the text message, and the call  should start.  You will need to type in your name, and allow camera and microphone access.  This is HIPPA compliant, and secure.      If you have not already done so, get immunized to COVID 19.      We are committed to getting you vaccinated as soon as possible and will be closely following CDC and [facility] guidelines as they are released and revised.  Please refer to our COVID-19 vaccine webpage for the most up to date information on the vaccine and our distribution efforts.    This site will also have the most up to date recommendations for COVID booster vaccine.    https://www.[facility].org/covid-19/protect-yourself/covid-19-vaccine    Call [phone]-SJOTRYF ([phone]), option 7    You can also visit https://www.vaccines.gov/ to find vaccines in your area.    OUR LOCATION:    [facility]  [address] [place] [address], [address]  [place], [place], [address] [phone]  Fax: [phone]    Lab services, Rheumatology, and OB/GYN are at this location as well.

## 2024-02-02 NOTE — LETTER
February 2, 2024     Patient: Candi Marshall  YOB: 1985  Date of Visit: 2/2/2024      To Whom it May Concern:    Candi Marshall is under my professional care. Candi was seen in my office on 2/2/2024. Candi may return to work on 5Feb2024, no restrictions, full duty .    If you have any questions or concerns, please don't hesitate to call.         Sincerely,          Rick Moore MD        CC: No Recipients

## 2024-02-12 DIAGNOSIS — F41.1 GENERALIZED ANXIETY DISORDER: ICD-10-CM

## 2024-02-13 ENCOUNTER — TELEPHONE (OUTPATIENT)
Dept: PSYCHIATRY | Facility: CLINIC | Age: 39
End: 2024-02-13

## 2024-02-13 RX ORDER — ESCITALOPRAM OXALATE 10 MG/1
15 TABLET ORAL DAILY
Qty: 135 TABLET | Refills: 1 | Status: SHIPPED | OUTPATIENT
Start: 2024-02-13

## 2024-02-13 NOTE — TELEPHONE ENCOUNTER
Writer CIARA informing patient today's apt has been cancelled due to provider being out. Next apt is 3/12

## 2024-02-23 ENCOUNTER — TELEPHONE (OUTPATIENT)
Dept: HEMATOLOGY ONCOLOGY | Facility: CLINIC | Age: 39
End: 2024-02-23

## 2024-02-23 NOTE — TELEPHONE ENCOUNTER
Appointment Change  Cancel, Reschedule, Change to Virtual      Who are you speaking with? Patient   If it is not the patient, is the caller listed on the communication consent form? N/A   Which provider is the appointment scheduled with? KAREN Scott   When was the original appointment scheduled?    Please list date and time 2/23/24 9am   At which location is the appointment scheduled to take place? [place]   Was the appointment rescheduled?     Was the appointment changed from an in person visit to a virtual visit?    If so, please list the details of the change. 3/8/24 1130   What is the reason for the appointment change? Pt thought she had a mammo today and went to the wrong location       Was STAR transport scheduled? N/A   Does STAR transport need to be scheduled for the new visit (if applicable) N/A   Does the patient need an infusion appointment rescheduled? N/A   Does the patient have an upcoming infusion appointment scheduled? If so, when? No   Is the patient undergoing chemotherapy? N/A   For appointments cancelled with less than 24 hours:  Was the no-show policy reviewed? Yes

## 2024-03-08 ENCOUNTER — OFFICE VISIT (OUTPATIENT)
Dept: SURGICAL ONCOLOGY | Facility: CLINIC | Age: 39
End: 2024-03-08
Payer: COMMERCIAL

## 2024-03-08 VITALS
OXYGEN SATURATION: 96 % | BODY MASS INDEX: 26.64 KG/M2 | WEIGHT: 175.8 LBS | HEART RATE: 79 BPM | SYSTOLIC BLOOD PRESSURE: 120 MMHG | HEIGHT: 68 IN | RESPIRATION RATE: 16 BRPM | DIASTOLIC BLOOD PRESSURE: 86 MMHG | TEMPERATURE: 97.8 F

## 2024-03-08 DIAGNOSIS — R92.30 DENSE BREAST TISSUE: ICD-10-CM

## 2024-03-08 DIAGNOSIS — R92.8 ABNORMAL FINDING ON BREAST IMAGING: ICD-10-CM

## 2024-03-08 DIAGNOSIS — N64.89 RADIAL SCAR OF RIGHT BREAST: Primary | ICD-10-CM

## 2024-03-08 PROCEDURE — 99213 OFFICE O/P EST LOW 20 MIN: CPT | Performed by: NURSE PRACTITIONER

## 2024-03-08 NOTE — PROGRESS NOTES
Surgical Oncology Follow Up       [address]   [facility] [place]  [address] [facility] [address]    Candi SAMMIE Joanna  1985  [id number]      Chief Complaint   Patient presents with    Follow-up       Assessment/Plan:  1. Radial scar of right breast    2. Abnormal finding on breast imaging  - Diagnostic imaging scheduled for 6/3/24  - 1 year f/u visit    3. Dense breast tissue    Discussion/Summary: Patient is a 39-year-old female that underwent a lumpectomy with Dr. Childs and August 2023 for a radial scar.  We are following a probably benign right breast mass.  She had a repeat diagnostic ultrasound in December 2023 which revealed a stable 8 x 4 x 7 mm oval circumscribed hypoechoic mass.  A 6-month follow-up ultrasound was recommended at the time of the patient's next bilateral mammogram.  This is scheduled for Melly 3, 2024.  She also has a family history of both breast and ovarian cancer.  She had genetic testing in 2016 at [facility] was was negative.  She offers no new complaints today and there are no worrisome findings on today's exam.  Assuming her imaging remained stable, we will plan to see her back in 1 year.  She will receive a clinical breast exam with her gynecologist in the interim.  She was instructed to contact us with any changes or concerns prior to that time.  All questions were answered today.    History of Present Illness:     -Interval History: Patient presents today for follow-up visit.  She has not appreciated any changes on self breast exam.  She had a diagnostic right breast ultrasound in December which revealed an 8 x 4 x 7 mm stable right breast mass.  A 6-month follow-up ultrasound was recommended.  This is scheduled for June.  Patient reports no changes in her family history.  She states that her cousin, daughter of her aunt who had ovarian cancer also underwent genetic testing which was negative.    Review of Systems:  Review of  Systems   Constitutional:  Negative for chills, fatigue and fever.   Respiratory:  Negative for cough and shortness of breath.    Cardiovascular:  Negative for chest pain.   Hematological:  Negative for adenopathy.   Psychiatric/Behavioral:  Negative for confusion.        Patient Active Problem List   Diagnosis    Allergic rhinitis    Anxiety disorder    Asthma    Fatigue    Heart palpitations    Low back pain    GERD (gastroesophageal reflux disease)    Depression    Family history of ovarian cancer    Irritable bowel syndrome with diarrhea    Radial scar of right breast    BRCA negative    Dense breast tissue    PONV (postoperative nausea and vomiting)    Abnormal finding on breast imaging    Rib fracture    Umbilical hernia     Past Medical History:   Diagnosis Date    Anxiety     Asthma     hx-infrequent as adult    Back pain     history of sports injuries    Depression     Eating disorder     GERD (gastroesophageal reflux disease)     PONV (postoperative nausea and vomiting)      Past Surgical History:   Procedure Laterality Date    BREAST BIOPSY Right 06/06/2023    BREAST LUMPECTOMY Right 8/4/2023    Procedure: LUMPECTOMY BREAST TONYA LOCALIZED;  Surgeon: Tova Childs MD;  Location: [facility];  Service: Surgical Oncology    EGD      US BREAST CLIP NEEDLE LOC RIGHT Right 07/14/2023    US BREAST NEEDLE LOC LEFT Left 05/09/2012    US GUIDED BREAST BIOPSY RIGHT COMPLETE Right 06/06/2023    WISDOM TOOTH EXTRACTION       Family History   Problem Relation Age of Onset    Irregular heart beat Mother         Proxysmal Atrial Tachycardia, PVC    Asthma Father     Hyperlipidemia Father     Hypertension Father     Anxiety disorder Father     No Known Problems Sister     Ovarian cancer Maternal Aunt 67    Breast cancer Maternal Aunt 69        stage 4 metastatic breast cancer    Uterine cancer Maternal Grandmother 68    Prostate cancer Maternal Grandfather 67    No Known Problems Paternal Grandmother     Emphysema Paternal  Grandfather      Social History     Socioeconomic History    Marital status: /Civil Union     Spouse name: Not on file    Number of children: Not on file    Years of education: Not on file    Highest education level: Not on file   Occupational History    Not on file   Tobacco Use    Smoking status: Former     Current packs/day: 0.00     Types: Cigarettes     Quit date:      Years since quittin.1    Smokeless tobacco: Never   Vaping Use    Vaping status: Never Used   Substance and Sexual Activity    Alcohol use: Yes     Alcohol/week: 3.0 standard drinks of alcohol     Types: 3 Glasses of wine per week    Drug use: No    Sexual activity: Yes     Partners: Male     Birth control/protection: None   Other Topics Concern    Not on file   Social History Narrative    Not on file     Social Determinants of Health     Financial Resource Strain: Not on file   Food Insecurity: Not on file   Transportation Needs: Not on file   Physical Activity: Not on file   Stress: Not on file   Social Connections: Not on file   Intimate Partner Violence: Not on file   Housing Stability: Not on file       Current Outpatient Medications:     albuterol (PROVENTIL HFA,VENTOLIN HFA) 90 mcg/act inhaler, Inhale 2 puffs every 4 (four) hours as needed for wheezing or shortness of breath, Disp: 18 g, Rfl: 11    azelastine (ASTELIN) 0.1 % nasal spray, 2 sprays into each nostril as needed  , Disp: , Rfl:     benzonatate (TESSALON) 200 MG capsule, Take 1 capsule (200 mg total) by mouth 3 (three) times a day as needed for cough, Disp: 20 capsule, Rfl: 0    budesonide-formoterol (SYMBICORT) 160-4.5 mcg/act inhaler, Inhale 2 puffs 2 (two) times a day Rinse mouth after use., Disp: 10.2 g, Rfl: 5    cetirizine (ZyrTEC) 10 mg tablet, Take 1 tablet (10 mg total) by mouth daily (Patient taking differently: Take 10 mg by mouth as needed), Disp: 90 tablet, Rfl: 3    escitalopram (LEXAPRO) 10 mg tablet, TAKE 1.5 TABLETS BY MOUTH DAILY, Disp: 135  tablet, Rfl: 1    famotidine (PEPCID) 20 mg tablet, Take 1 tablet (20 mg total) by mouth daily (Patient taking differently: Take 20 mg by mouth daily at bedtime), Disp: 90 tablet, Rfl: 3    hyoscyamine (LEVSIN/SL) 0.125 mg SL tablet, Take 1 tablet (0.125 mg total) by mouth every 6 (six) hours as needed for cramping or diarrhea, Disp: 60 tablet, Rfl: 5    Lactobacillus Rhamnosus, GG, (CULTURELLE PO), Take by mouth, Disp: , Rfl:     Lidocaine, Anorectal, 5 % CREA, Apply topically 3 (three) times a day as needed (pain), Disp: 113 g, Rfl: 0    metoprolol succinate (TOPROL-XL) 25 mg 24 hr tablet, TAKE ONE TABLET BY MOUTH EVERY DAY, Disp: 90 tablet, Rfl: 3    multivitamin (THERAGRAN) TABS, Take 1 tablet by mouth daily, Disp: , Rfl:     omeprazole (PriLOSEC) 20 mg delayed release capsule, Take 1 capsule (20 mg total) by mouth daily, Disp: 90 capsule, Rfl: 3    hydrocortisone (ANUSOL-HC) 2.5 % rectal cream, Apply topically 2 (two) times a day (Patient not taking: Reported on 3/8/2024), Disp: 28 g, Rfl: 0    Na Sulfate-K Sulfate-Mg Sulf 17.5-3.13-1.6 GM/177ML SOLN, PLEASE FOLLOW INSTRUCTIONS PROVIDED BY PHYSICIAN'S OFFICE (Patient not taking: Reported on 1/19/2024), Disp: , Rfl:   Allergies   Allergen Reactions    Bee Venom Rash     Cellulitis    Codeine      Other reaction(s): Nausea and/or vomiting    Oxycodone-Acetaminophen Vomiting     Vitals:    03/08/24 1128   BP: 120/86   Pulse: 79   Resp: 16   Temp: 97.8 °F (36.6 °C)   SpO2: 96%       Physical Exam  Vitals reviewed.   Constitutional:       Appearance: Normal appearance.   HENT:      Head: Normocephalic and atraumatic.   Pulmonary:      Effort: Pulmonary effort is normal.   Chest:   Breasts:     Right: Skin change (surgical scar) present. No swelling, bleeding, inverted nipple, mass, nipple discharge or tenderness.      Left: No swelling, bleeding, inverted nipple, mass, nipple discharge, skin change or tenderness.   Lymphadenopathy:      Upper Body:      Right upper  body: No supraclavicular or axillary adenopathy.      Left upper body: No supraclavicular or axillary adenopathy.   Skin:     General: Skin is warm and dry.   Neurological:      General: No focal deficit present.      Mental Status: She is alert and oriented to person, place, and time.   Psychiatric:         Mood and Affect: Mood normal.           Advance Care Planning/Advance Directives:  Discussed disease status and treatment goals with the patient.

## 2024-03-12 ENCOUNTER — TELEMEDICINE (OUTPATIENT)
Dept: BEHAVIORAL/MENTAL HEALTH CLINIC | Facility: CLINIC | Age: 39
End: 2024-03-12
Payer: COMMERCIAL

## 2024-03-12 DIAGNOSIS — F41.1 GENERALIZED ANXIETY DISORDER: Primary | ICD-10-CM

## 2024-03-12 DIAGNOSIS — F32.9 MAJOR DEPRESSIVE DISORDER WITH CURRENT ACTIVE EPISODE, UNSPECIFIED DEPRESSION EPISODE SEVERITY, UNSPECIFIED WHETHER RECURRENT: ICD-10-CM

## 2024-03-12 PROCEDURE — 90837 PSYTX W PT 60 MINUTES: CPT | Performed by: SOCIAL WORKER

## 2024-03-13 NOTE — PSYCH
Treatment Plan Tracking    Treatment Plan not completed within required time limits due to:  Late due to scheduling .

## 2024-03-13 NOTE — BH TREATMENT PLAN
Outpatient Behavioral Health Psychotherapy Treatment Plan    Candi Lambertjama  1985     Date of Initial Psychotherapy Assessment: 4/2/21   Date of Current Treatment Plan: 03/13/24  Treatment Plan Target Date: 9/12/24  Treatment Plan Expiration Date: 9/12/24    Diagnosis:   1. Generalized anxiety disorder        2. Major depressive disorder with current active episode, unspecified depression episode severity, unspecified whether recurrent                Area(s) of Need:   Grief/loss  Stress     Long Term Goal 1 (in the client's own words): Be able to cope with the losses     Stage of Change: Action     Target Date for completion: TBD             Anticipated therapeutic modalities: CBT             People identified to complete this goal: Keiry                    Objective 1: (identify the means of measuring success in meeting the objective):               Be able to talk about it         Do not minimize what I am feeling                    Objective 2: (identify the means of measuring success in meeting the objective):               Use my support system         Set boundaries with mom      Long Term Goal 2 (in the client's own words): Be able to manage my stress        Stage of Change: Action     Target Date for completion: TBD             Anticipated therapeutic modalities: CBT             People identified to complete this goal: Keiry                    Objective 1: (identify the means of measuring success in meeting the objective):           Set boundaries with others          Be able to identify my triggers                    Objective 2: (identify the means of measuring success in meeting the objective):           Use healthy coping mechanisms for stress           I am currently under the care of a [facility] psychiatric provider: no     My [facility] psychiatric provider is: N/A     I am currently taking psychiatric medications: N/A     I feel that I will be ready for discharge from mental health  care when I reach the following (measurable goal/objective): Goals completed 90%     For children and adults who have a legal guardian:          Has there been any change to custody orders and/or guardianship status? NA. If yes, attach updated documentation.     I have created my Crisis Plan and have been offered a copy of this plan     Behavioral Health Treatment Plan  Luke: Diagnosis and Treatment Plan explained to Candi Marshall acknowledges an understanding of their diagnosis. Candi Marshall agrees to this treatment plan.     I have been offered a copy of this Treatment Plan. yes

## 2024-03-13 NOTE — PSYCH
Virtual Regular Visit    Verification of patient location:    Patient is located at Home in the following state in which I hold an active license [place]      Assessment/Plan:    Problem List Items Addressed This Visit       Anxiety disorder - Primary    Depression       Goals addressed in session: Goal 1          Reason for visit is   Chief Complaint   Patient presents with    Virtual Regular Visit          Encounter provider Claribel Denton    Provider located at [facility] [place]  [facility] [place]  [address] [address] [phone]      Recent Visits  Date Type Provider Dept   03/12/24 Telemedicine Claribel Denton [facility] [place]   Showing recent visits within past 7 days and meeting all other requirements  Future Appointments  No visits were found meeting these conditions.  Showing future appointments within next 150 days and meeting all other requirements       The patient was identified by name and date of birth. Candi Marshall was informed that this is a telemedicine visit and that the visit is being conducted through the Epic Embedded platform. She agrees to proceed..  My office door was closed. No one else was in the room.  She acknowledged consent and understanding of privacy and security of the video platform. The patient has agreed to participate and understands they can discontinue the visit at any time.    Patient is aware this is a billable service.     HPI     Past Medical History:   Diagnosis Date    Anxiety     Asthma     hx-infrequent as adult    Back pain     history of sports injuries    Depression     Eating disorder     GERD (gastroesophageal reflux disease)     PONV (postoperative nausea and vomiting)        Past Surgical History:   Procedure Laterality Date    BREAST BIOPSY Right 06/06/2023    BREAST LUMPECTOMY Right 8/4/2023    Procedure: LUMPECTOMY BREAST TONYA LOCALIZED;   Surgeon: Tova Childs MD;  Location: [facility];  Service: Surgical Oncology    EGD      US BREAST CLIP NEEDLE LOC RIGHT Right 07/14/2023    US BREAST NEEDLE LOC LEFT Left 05/09/2012    US GUIDED BREAST BIOPSY RIGHT COMPLETE Right 06/06/2023    WISDOM TOOTH EXTRACTION         Current Outpatient Medications   Medication Sig Dispense Refill    albuterol (PROVENTIL HFA,VENTOLIN HFA) 90 mcg/act inhaler Inhale 2 puffs every 4 (four) hours as needed for wheezing or shortness of breath 18 g 11    azelastine (ASTELIN) 0.1 % nasal spray 2 sprays into each nostril as needed        benzonatate (TESSALON) 200 MG capsule Take 1 capsule (200 mg total) by mouth 3 (three) times a day as needed for cough 20 capsule 0    budesonide-formoterol (SYMBICORT) 160-4.5 mcg/act inhaler Inhale 2 puffs 2 (two) times a day Rinse mouth after use. 10.2 g 5    cetirizine (ZyrTEC) 10 mg tablet Take 1 tablet (10 mg total) by mouth daily (Patient taking differently: Take 10 mg by mouth as needed) 90 tablet 3    escitalopram (LEXAPRO) 10 mg tablet TAKE 1.5 TABLETS BY MOUTH DAILY 135 tablet 1    famotidine (PEPCID) 20 mg tablet Take 1 tablet (20 mg total) by mouth daily (Patient taking differently: Take 20 mg by mouth daily at bedtime) 90 tablet 3    hydrocortisone (ANUSOL-HC) 2.5 % rectal cream Apply topically 2 (two) times a day (Patient not taking: Reported on 3/8/2024) 28 g 0    hyoscyamine (LEVSIN/SL) 0.125 mg SL tablet Take 1 tablet (0.125 mg total) by mouth every 6 (six) hours as needed for cramping or diarrhea 60 tablet 5    Lactobacillus Rhamnosus, GG, (CULTURELLE PO) Take by mouth      Lidocaine, Anorectal, 5 % CREA Apply topically 3 (three) times a day as needed (pain) 113 g 0    metoprolol succinate (TOPROL-XL) 25 mg 24 hr tablet TAKE ONE TABLET BY MOUTH EVERY DAY 90 tablet 3    multivitamin (THERAGRAN) TABS Take 1 tablet by mouth daily      Na Sulfate-K Sulfate-Mg Sulf 17.5-3.13-1.6 GM/177ML SOLN PLEASE FOLLOW INSTRUCTIONS PROVIDED BY  PHYSICIAN'S OFFICE (Patient not taking: Reported on 1/19/2024)      omeprazole (PriLOSEC) 20 mg delayed release capsule Take 1 capsule (20 mg total) by mouth daily 90 capsule 3     No current facility-administered medications for this visit.        Allergies   Allergen Reactions    Bee Venom Rash     Cellulitis    Codeine      Other reaction(s): Nausea and/or vomiting    Oxycodone-Acetaminophen Vomiting       Review of Systems    Video Exam    There were no vitals filed for this visit.    Physical Exam     Behavioral Health Psychotherapy Progress Note    Psychotherapy Provided: Individual Psychotherapy     1. Generalized anxiety disorder        2. Major depressive disorder with current active episode, unspecified depression episode severity, unspecified whether recurrent            Goals addressed in session: Goal 1     DATA: Keiry stated that things have been stressful. She stated that her mother and sister were unkind to her at her birthday celebration and were gaslighting her when discussing past events that were hurtful. 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 Also discussing her health issues and those of her  which have been stressful to navigate. Discussing continuing to set boundaries with her mother and sister and also be less difficult on herself regarding school. Giving supportive therapy  During this session, this clinician used the following therapeutic modalities: Cognitive Behavioral Therapy and Supportive Psychotherapy    Substance Abuse was addressed during this session. If the client is diagnosed with a co-occurring substance use disorder, please indicate any changes in the frequency or amount of use: Keiry stated  "that she has significant decreased her alcohol intake. Stage of change for addressing substance use diagnoses: Action    ASSESSMENT:  Candi Marshall presents with a Euthymic/ normal mood.     her affect is Normal range and intensity, which is congruent, with her mood and the content of the session. The client has made progress on their goals.    Continues to to set boundaries with her family members Candi Marshall presents with a none risk of suicide, none risk of self-harm, and none risk of harm to others.    For any risk assessment that surpasses a \"low\" rating, a safety plan must be developed.    A safety plan was indicated: no  If yes, describe in detail N/A    PLAN: Between sessions, Candi Marshall will continue to set and maintain boundaries with her family members. At the next session, the therapist will use Cognitive Behavioral Therapy and Supportive Psychotherapy to address treatment goals.    Behavioral Health Treatment Plan and Discharge Planning: Candi Marshall is aware of and agrees to continue to work on their treatment plan. They have identified and are working toward their discharge goals. yes    Visit start and stop times:    03/12/24  Start Time: 0903  Stop Time: 0956  Total Visit Time: 53 minutes  "

## 2024-04-09 ENCOUNTER — TELEMEDICINE (OUTPATIENT)
Dept: BEHAVIORAL/MENTAL HEALTH CLINIC | Facility: CLINIC | Age: 39
End: 2024-04-09

## 2024-04-09 DIAGNOSIS — F32.9 MAJOR DEPRESSIVE DISORDER WITH CURRENT ACTIVE EPISODE, UNSPECIFIED DEPRESSION EPISODE SEVERITY, UNSPECIFIED WHETHER RECURRENT: ICD-10-CM

## 2024-04-09 DIAGNOSIS — F41.1 GENERALIZED ANXIETY DISORDER: Primary | ICD-10-CM

## 2024-04-09 NOTE — PSYCH
Virtual Regular Visit    Verification of patient location:    Patient is located at Home in the following state in which I hold an active license [place]      Assessment/Plan:    Problem List Items Addressed This Visit       Anxiety disorder - Primary    Depression       Goals addressed in session: Goal 1          Reason for visit is   Chief Complaint   Patient presents with    Virtual Regular Visit          Encounter provider Claribel Denton    Provider located at [facility] [place]  [facility] [place]  [address]  [place] [address] [phone]      Recent Visits  No visits were found meeting these conditions.  Showing recent visits within past 7 days and meeting all other requirements  Today's Visits  Date Type Provider Dept   04/09/24 Telemedicine Claribel Denton [facility] [place]   Showing today's visits and meeting all other requirements  Future Appointments  No visits were found meeting these conditions.  Showing future appointments within next 150 days and meeting all other requirements       The patient was identified by name and date of birth. Candi SAMMIE Ebonyjosephjama was informed that this is a telemedicine visit and that the visit is being conducted through the Epic Embedded platform. She agrees to proceed..  My office door was closed. No one else was in the room.  She acknowledged consent and understanding of privacy and security of the video platform. The patient has agreed to participate and understands they can discontinue the visit at any time.    Patient is aware this is a billable service.       HPI     Past Medical History:   Diagnosis Date    Anxiety     Asthma     hx-infrequent as adult    Back pain     history of sports injuries    Depression     Eating disorder     GERD (gastroesophageal reflux disease)     PONV (postoperative nausea and vomiting)        Past Surgical History:   Procedure Laterality Date     BREAST BIOPSY Right 06/06/2023    BREAST LUMPECTOMY Right 8/4/2023    Procedure: LUMPECTOMY BREAST TONYA LOCALIZED;  Surgeon: Tova Childs MD;  Location: [facility];  Service: Surgical Oncology    EGD      US BREAST CLIP NEEDLE LOC RIGHT Right 07/14/2023    US BREAST NEEDLE LOC LEFT Left 05/09/2012    US GUIDED BREAST BIOPSY RIGHT COMPLETE Right 06/06/2023    WISDOM TOOTH EXTRACTION         Current Outpatient Medications   Medication Sig Dispense Refill    albuterol (PROVENTIL HFA,VENTOLIN HFA) 90 mcg/act inhaler Inhale 2 puffs every 4 (four) hours as needed for wheezing or shortness of breath 18 g 11    azelastine (ASTELIN) 0.1 % nasal spray 2 sprays into each nostril as needed        benzonatate (TESSALON) 200 MG capsule Take 1 capsule (200 mg total) by mouth 3 (three) times a day as needed for cough 20 capsule 0    budesonide-formoterol (SYMBICORT) 160-4.5 mcg/act inhaler Inhale 2 puffs 2 (two) times a day Rinse mouth after use. 10.2 g 5    cetirizine (ZyrTEC) 10 mg tablet Take 1 tablet (10 mg total) by mouth daily (Patient taking differently: Take 10 mg by mouth as needed) 90 tablet 3    escitalopram (LEXAPRO) 10 mg tablet TAKE 1.5 TABLETS BY MOUTH DAILY 135 tablet 1    famotidine (PEPCID) 20 mg tablet Take 1 tablet (20 mg total) by mouth daily (Patient taking differently: Take 20 mg by mouth daily at bedtime) 90 tablet 3    hydrocortisone (ANUSOL-HC) 2.5 % rectal cream Apply topically 2 (two) times a day (Patient not taking: Reported on 3/8/2024) 28 g 0    hyoscyamine (LEVSIN/SL) 0.125 mg SL tablet Take 1 tablet (0.125 mg total) by mouth every 6 (six) hours as needed for cramping or diarrhea 60 tablet 5    Lactobacillus Rhamnosus, GG, (CULTURELLE PO) Take by mouth      Lidocaine, Anorectal, 5 % CREA Apply topically 3 (three) times a day as needed (pain) 113 g 0    metoprolol succinate (TOPROL-XL) 25 mg 24 hr tablet TAKE ONE TABLET BY MOUTH EVERY DAY 90 tablet 3    multivitamin (THERAGRAN) TABS Take 1 tablet  by mouth daily      Na Sulfate-K Sulfate-Mg Sulf 17.5-3.13-1.6 GM/177ML SOLN PLEASE FOLLOW INSTRUCTIONS PROVIDED BY PHYSICIAN'S OFFICE (Patient not taking: Reported on 1/19/2024)      omeprazole (PriLOSEC) 20 mg delayed release capsule Take 1 capsule (20 mg total) by mouth daily 90 capsule 3     No current facility-administered medications for this visit.        Allergies   Allergen Reactions    Bee Venom Rash     Cellulitis    Codeine      Other reaction(s): Nausea and/or vomiting    Oxycodone-Acetaminophen Vomiting       Review of Systems    Video Exam    There were no vitals filed for this visit.    Physical Exam     Behavioral Health Psychotherapy Progress Note    Psychotherapy Provided: Individual Psychotherapy     1. Generalized anxiety disorder        2. Major depressive disorder with current active episode, unspecified depression episode severity, unspecified whether recurrent            Goals addressed in session: Goal 1     DATA: Keiry stated that she continues to worry about her 's health. She stated that the doctor's continue to test for various illnesses which have all come back unremarkable. Discussing his symptoms and her thoughts regarding his health. She feels that he could benefit from treatment but is reluctant to do this. Discussing ways for her to communicate her concerns to him and also offer various solutions. Giving supportive therapy  During this session, this clinician used the following therapeutic modalities: Cognitive Behavioral Therapy, Solution-Focused Therapy, and Supportive Psychotherapy    Substance Abuse was not addressed during this session. If the client is diagnosed with a co-occurring substance use disorder, please indicate any changes in the frequency or amount of use: N/A. Stage of change for addressing substance use diagnoses: No substance use/Not applicable    ASSESSMENT:  Candi Marshall presents with a Euthymic/ normal mood.     her affect is Normal range  "and intensity, which is congruent, with her mood and the content of the session. The client has made progress on their goals.    Continues to process her emotions Candi Marshall presents with a none risk of suicide, none risk of self-harm, and none risk of harm to others.    For any risk assessment that surpasses a \"low\" rating, a safety plan must be developed.    A safety plan was indicated: no  If yes, describe in detail N/A    PLAN: Between sessions, Candi Marshall will continue to process her emotions. At the next session, the therapist will use Cognitive Behavioral Therapy, Solution-Focused Therapy, and Supportive Psychotherapy to address treatment goals.    Behavioral Health Treatment Plan and Discharge Planning: Candi Marshall is aware of and agrees to continue to work on their treatment plan. They have identified and are working toward their discharge goals. yes    Visit start and stop times:    04/09/24  Start Time: 0909  Stop Time: 0957  Total Visit Time: 48 minutes    "

## 2024-04-18 DIAGNOSIS — R00.2 HEART PALPITATIONS: ICD-10-CM

## 2024-04-18 RX ORDER — METOPROLOL SUCCINATE 25 MG/1
TABLET, EXTENDED RELEASE ORAL
Qty: 90 TABLET | Refills: 1 | Status: SHIPPED | OUTPATIENT
Start: 2024-04-18

## 2024-04-22 ENCOUNTER — TELEPHONE (OUTPATIENT)
Age: 39
End: 2024-04-22

## 2024-04-22 NOTE — TELEPHONE ENCOUNTER
Patient called to set up a consult with Dr. John for breast reconstruction consult. Patient had a suspicious spot in her breast that was biopsied and removed (path showed non cancerous). However, it left her breast uneven. I advised that I would forward her information to Lida our coordinator for breast recon and she would be in touch.

## 2024-05-28 ENCOUNTER — CONSULT (OUTPATIENT)
Dept: PLASTIC SURGERY | Facility: CLINIC | Age: 39
End: 2024-05-28
Payer: COMMERCIAL

## 2024-05-28 VITALS
WEIGHT: 175 LBS | BODY MASS INDEX: 26.52 KG/M2 | DIASTOLIC BLOOD PRESSURE: 82 MMHG | SYSTOLIC BLOOD PRESSURE: 140 MMHG | HEIGHT: 68 IN

## 2024-05-28 DIAGNOSIS — N64.89 RADIAL SCAR OF RIGHT BREAST: Primary | ICD-10-CM

## 2024-05-28 PROCEDURE — 99204 OFFICE O/P NEW MOD 45 MIN: CPT | Performed by: STUDENT IN AN ORGANIZED HEALTH CARE EDUCATION/TRAINING PROGRAM

## 2024-05-29 ENCOUNTER — PREP FOR PROCEDURE (OUTPATIENT)
Dept: PLASTIC SURGERY | Facility: CLINIC | Age: 39
End: 2024-05-29

## 2024-05-29 DIAGNOSIS — Z85.3 HISTORY OF BREAST CANCER: Primary | ICD-10-CM

## 2024-06-03 ENCOUNTER — HOSPITAL ENCOUNTER (OUTPATIENT)
Dept: ULTRASOUND IMAGING | Facility: CLINIC | Age: 39
Discharge: HOME | End: 2024-06-03
Payer: COMMERCIAL

## 2024-06-03 ENCOUNTER — HOSPITAL ENCOUNTER (OUTPATIENT)
Dept: MAMMOGRAPHY | Facility: CLINIC | Age: 39
Discharge: HOME | End: 2024-06-03
Payer: COMMERCIAL

## 2024-06-03 VITALS — HEIGHT: 68 IN | BODY MASS INDEX: 26.52 KG/M2 | WEIGHT: 175 LBS

## 2024-06-03 DIAGNOSIS — R92.8 ABNORMAL FINDING ON RADIOLOGICAL EXAMINATION OF BREAST: ICD-10-CM

## 2024-06-03 PROCEDURE — 77066 DX MAMMO INCL CAD BI: CPT

## 2024-06-03 PROCEDURE — G0279 TOMOSYNTHESIS, MAMMO: HCPCS

## 2024-06-03 PROCEDURE — 76642 ULTRASOUND BREAST LIMITED: CPT

## 2024-06-07 NOTE — PROGRESS NOTES
Assessment and Plan:  Ms. Marshall is a 39 y.o. female presenting with breast asymmetry following right lumpectomy    We discussed the procedure, risks, benefits, alternatives and postoperative instructions and expectations.  I do think the patient would be a good candidate for mastopexy/BBR for symmetry.  I do think it would be most prudent to perform this however once the patient is at a stable and sustainable goal weight.   I would also advocate for her next imaging to be completed prior to considering surgery.  All patient's questions were answered and she voiced understanding.    History of Present Illness:   Ms. Marshall is a 39 y.o. female presenting with breast asymmetry.  She underwent a right lumpectomy in 2023 for radial scar/sclerosing lesion.  She is undergoing surveillance for a likely benign finding.  She does have an extensive family history of malignancy but genetic testing has been normal.  She reports a significant breast asymmetry.  She would like to lose weight and recognizes this will likely change the appearance of her breasts.  She denies nicotine use. She presents with her  today.       Review of Systems:  A 12 point ROS was performed and negative except per HPI.    Past Medical History:  Past Medical History:   Diagnosis Date    Anxiety     Asthma     hx-infrequent as adult    Back pain     history of sports injuries    Depression     Eating disorder     GERD (gastroesophageal reflux disease)     PONV (postoperative nausea and vomiting)        Past Surgical History:  Past Surgical History:   Procedure Laterality Date    BREAST BIOPSY Right 06/06/2023    BREAST LUMPECTOMY Right 08/04/2023    Procedure: LUMPECTOMY BREAST TONYA LOCALIZED;  Surgeon: Tova Childs MD;  [facility];  Service: Surgical Oncology    EGD      US BREAST CLIP NEEDLE LOC RIGHT Right 07/14/2023    US BREAST NEEDLE LOC LEFT Left 05/09/2012    US GUIDED BREAST BIOPSY RIGHT COMPLETE Right 06/06/2023    CHRIS  TOOTH EXTRACTION         Social History:  Social History     Tobacco Use    Smoking status: Former     Current packs/day: 0.00     Types: Cigarettes     Quit date:      Years since quittin.4    Smokeless tobacco: Never   Vaping Use    Vaping status: Never Used   Substance Use Topics    Alcohol use: Yes     Alcohol/week: 3.0 standard drinks of alcohol     Types: 3 Glasses of wine per week    Drug use: No       Family History:  Family History   Problem Relation Age of Onset    Irregular heart beat Mother         Proxysmal Atrial Tachycardia, PVC    Asthma Father     Hyperlipidemia Father     Hypertension Father     Anxiety disorder Father     No Known Problems Sister     Ovarian cancer Maternal Aunt 67    Breast cancer Maternal Aunt 69        stage 4 metastatic breast cancer    Uterine cancer Maternal Grandmother 68    Prostate cancer Maternal Grandfather 67    No Known Problems Paternal Grandmother     Emphysema Paternal Grandfather        Allergies:  Allergies   Allergen Reactions    Bee Venom Rash     Cellulitis    Codeine      Other reaction(s): Nausea and/or vomiting    Oxycodone-Acetaminophen Vomiting       Medications:  Current Outpatient Medications on File Prior to Visit   Medication Sig Dispense Refill    albuterol (PROVENTIL HFA,VENTOLIN HFA) 90 mcg/act inhaler Inhale 2 puffs every 4 (four) hours as needed for wheezing or shortness of breath 18 g 11    azelastine (ASTELIN) 0.1 % nasal spray 2 sprays into each nostril as needed        benzonatate (TESSALON) 200 MG capsule Take 1 capsule (200 mg total) by mouth 3 (three) times a day as needed for cough 20 capsule 0    budesonide-formoterol (SYMBICORT) 160-4.5 mcg/act inhaler Inhale 2 puffs 2 (two) times a day Rinse mouth after use. 10.2 g 5    cetirizine (ZyrTEC) 10 mg tablet Take 1 tablet (10 mg total) by mouth daily (Patient taking differently: Take 10 mg by mouth as needed) 90 tablet 3    escitalopram (LEXAPRO) 10 mg tablet TAKE 1.5 TABLETS BY  "MOUTH DAILY 135 tablet 1    famotidine (PEPCID) 20 mg tablet Take 1 tablet (20 mg total) by mouth daily (Patient taking differently: Take 20 mg by mouth daily at bedtime) 90 tablet 3    hydrocortisone (ANUSOL-HC) 2.5 % rectal cream Apply topically 2 (two) times a day (Patient not taking: Reported on 3/8/2024) 28 g 0    hyoscyamine (LEVSIN/SL) 0.125 mg SL tablet Take 1 tablet (0.125 mg total) by mouth every 6 (six) hours as needed for cramping or diarrhea 60 tablet 5    Lactobacillus Rhamnosus, GG, (CULTURELLE PO) Take by mouth      Lidocaine, Anorectal, 5 % CREA Apply topically 3 (three) times a day as needed (pain) 113 g 0    metoprolol succinate (TOPROL-XL) 25 mg 24 hr tablet TAKE ONE TABLET BY MOUTH EVERY DAY 90 tablet 1    multivitamin (THERAGRAN) TABS Take 1 tablet by mouth daily      Na Sulfate-K Sulfate-Mg Sulf 17.5-3.13-1.6 GM/177ML SOLN PLEASE FOLLOW INSTRUCTIONS PROVIDED BY PHYSICIAN'S OFFICE (Patient not taking: Reported on 1/19/2024)      omeprazole (PriLOSEC) 20 mg delayed release capsule Take 1 capsule (20 mg total) by mouth daily 90 capsule 3     No current facility-administered medications on file prior to visit.         Physical Examination:  /82   Ht 5' 8\" (1.727 m)   Wt 79.4 kg (175 lb)   BMI 26.61 kg/m²   Estimated body mass index is 26.61 kg/m² as calculated from the following:    Height as of this encounter: 5' 8\" (1.727 m).    Weight as of this encounter: 79.4 kg (175 lb).  General: NAD, well appearing, AAOx3  HEENT: NCAT, EOMI, MMM, supple  Resp: Nonlabored  Heart: RRR  Abdomen: Soft, ND, NT  Extremities/MSK: no LE edema, no obvious deficits in ROM  Neuro: grossly intact with no obvious deficits  Skin: no obvious lesions or rashes  Breast: no palpable mass, no palpable axillary lymphadenopathy, well healed scar on right, grade I ptosis, L larger c/w R      Ana Lilia John, DO  Plastic and Reconstructive Surgery    "

## 2024-06-11 ENCOUNTER — TELEMEDICINE (OUTPATIENT)
Dept: BEHAVIORAL/MENTAL HEALTH CLINIC | Facility: CLINIC | Age: 39
End: 2024-06-11
Payer: COMMERCIAL

## 2024-06-11 DIAGNOSIS — F41.1 GENERALIZED ANXIETY DISORDER: Primary | ICD-10-CM

## 2024-06-11 DIAGNOSIS — F32.9 MAJOR DEPRESSIVE DISORDER WITH CURRENT ACTIVE EPISODE, UNSPECIFIED DEPRESSION EPISODE SEVERITY, UNSPECIFIED WHETHER RECURRENT: ICD-10-CM

## 2024-06-11 PROCEDURE — 90837 PSYTX W PT 60 MINUTES: CPT | Performed by: SOCIAL WORKER

## 2024-06-24 NOTE — PSYCH
"Behavioral Health Psychotherapy Progress Note    Psychotherapy Provided: Individual Psychotherapy     1. Generalized anxiety disorder        2. Major depressive disorder with current active episode, unspecified depression episode severity, unspecified whether recurrent            Goals addressed in session: Goal 1     DATA: Keiry presetned for treatmetn with riana peacock. They shared that their Rotweiler was recently diagnosed with cancer. They also shared that her  continues to struggle with health issues that are ffecting his mood and ability to function at times. Discussig the difficulties that they have been facing dueot this and how it has affected their realtionship as well as Keiry's mental health. Discussing wyas for douglas to continue to communicate regrding these difficulties and also discussing her  potentially seeking treatmet as a means to cope with the onogin stress of  health. Giving supportive therapy  During this session, this clinician used the following therapeutic modalities: Cognitive Behavioral Therapy and Supportive Psychotherapy    Substance Abuse was not addressed during this session. If the client is diagnosed with a co-occurring substance use disorder, please indicate any changes in the frequency or amount of use: N/A. Stage of change for addressing substance use diagnoses: No substance use/Not applicable    ASSESSMENT:  Candi Marshall presents with a Anxious mood.     her affect is Normal range and intensity, which is congruent, with her mood and the content of the session. The client has made progress on their goals.    Continues to work on increasing cmmunication with her  and problem solving their issues Candi Marshall presents with a none risk of suicide, none risk of self-harm, and none risk of harm to others.    For any risk assessment that surpasses a \"low\" rating, a safety plan must be developed.    A safety plan was indicated: no  If yes, " describe in detail N/A    PLAN: Between sessions, Candi Marshall will continue to increase effective communciation with her . At the next session, the therapist will use Cognitive Behavioral Therapy and Supportive Psychotherapy to address carlitos silverman.    Behavioral Health Treatment Plan and Discharge Planning: Candi Marshall is aware of and agrees to continue to work on their treatment plan. They have identified and are working toward their discharge goals. yes    Visit start and stop times:    06/11/24  Start Time: 1301  Stop Time: 1354  Total Visit Time: 53 minutes    Virtual Regular Visit    Verification of patient location:    Patient is located at Home in the following state in which I hold an active license [place]      Assessment/Plan:    Problem List Items Addressed This Visit       Anxiety disorder - Primary    Depression       Goals addressed in session: Goal 1          Reason for visit is   Chief Complaint   Patient presents with    Virtual Regular Visit          Encounter provider Claribel Denton      Recent Visits  No visits were found meeting these conditions.  Showing recent visits within past 7 days and meeting all other requirements  Future Appointments  No visits were found meeting these conditions.  Showing future appointments within next 150 days and meeting all other requirements       The patient was identified by name and date of birth. Candi Marshall was informed that this is a telemedicine visit and that the visit is being conducted throughthe Epic Embedded platform. She agrees to proceed..  My office door was closed. No one else was in the room.  She acknowledged consent and understanding of privacy and security of the video platform. The patient has agreed to participate and understands they can discontinue the visit at any time.    Patient is aware this is a billable service.     HPI     Past Medical History:   Diagnosis Date    Anxiety     Asthma      hx-infrequent as adult    Back pain     history of sports injuries    Depression     Eating disorder     GERD (gastroesophageal reflux disease)     PONV (postoperative nausea and vomiting)        Past Surgical History:   Procedure Laterality Date    BREAST BIOPSY Right 06/06/2023    BREAST LUMPECTOMY Right 08/04/2023    Procedure: LUMPECTOMY BREAST TONYA LOCALIZED;  Surgeon: Tova Childs MD;  Location: [facility];  Service: Surgical Oncology    EGD      US BREAST CLIP NEEDLE LOC RIGHT Right 07/14/2023    US BREAST NEEDLE LOC LEFT Left 05/09/2012    US GUIDED BREAST BIOPSY RIGHT COMPLETE Right 06/06/2023    WISDOM TOOTH EXTRACTION         Current Outpatient Medications   Medication Sig Dispense Refill    albuterol (PROVENTIL HFA,VENTOLIN HFA) 90 mcg/act inhaler Inhale 2 puffs every 4 (four) hours as needed for wheezing or shortness of breath 18 g 11    azelastine (ASTELIN) 0.1 % nasal spray 2 sprays into each nostril as needed        benzonatate (TESSALON) 200 MG capsule Take 1 capsule (200 mg total) by mouth 3 (three) times a day as needed for cough 20 capsule 0    budesonide-formoterol (SYMBICORT) 160-4.5 mcg/act inhaler Inhale 2 puffs 2 (two) times a day Rinse mouth after use. 10.2 g 5    cetirizine (ZyrTEC) 10 mg tablet Take 1 tablet (10 mg total) by mouth daily (Patient taking differently: Take 10 mg by mouth as needed) 90 tablet 3    escitalopram (LEXAPRO) 10 mg tablet TAKE 1.5 TABLETS BY MOUTH DAILY 135 tablet 1    famotidine (PEPCID) 20 mg tablet Take 1 tablet (20 mg total) by mouth daily (Patient taking differently: Take 20 mg by mouth daily at bedtime) 90 tablet 3    hydrocortisone (ANUSOL-HC) 2.5 % rectal cream Apply topically 2 (two) times a day (Patient not taking: Reported on 3/8/2024) 28 g 0    hyoscyamine (LEVSIN/SL) 0.125 mg SL tablet Take 1 tablet (0.125 mg total) by mouth every 6 (six) hours as needed for cramping or diarrhea 60 tablet 5    Lactobacillus Rhamnosus, GG, (CULTURELLE PO) Take by mouth       Lidocaine, Anorectal, 5 % CREA Apply topically 3 (three) times a day as needed (pain) 113 g 0    metoprolol succinate (TOPROL-XL) 25 mg 24 hr tablet TAKE ONE TABLET BY MOUTH EVERY DAY 90 tablet 1    multivitamin (THERAGRAN) TABS Take 1 tablet by mouth daily      Na Sulfate-K Sulfate-Mg Sulf 17.5-3.13-1.6 GM/177ML SOLN PLEASE FOLLOW INSTRUCTIONS PROVIDED BY PHYSICIAN'S OFFICE (Patient not taking: Reported on 1/19/2024)      omeprazole (PriLOSEC) 20 mg delayed release capsule Take 1 capsule (20 mg total) by mouth daily 90 capsule 3     No current facility-administered medications for this visit.        Allergies   Allergen Reactions    Bee Venom Rash     Cellulitis    Codeine      Other reaction(s): Nausea and/or vomiting    Oxycodone-Acetaminophen Vomiting       Review of Systems    Video Exam    There were no vitals filed for this visit.    Physical Exam

## 2024-07-08 ENCOUNTER — TELEPHONE (OUTPATIENT)
Dept: BEHAVIORAL/MENTAL HEALTH CLINIC | Facility: CLINIC | Age: 39
End: 2024-07-08

## 2024-07-22 ENCOUNTER — TELEMEDICINE (OUTPATIENT)
Dept: BEHAVIORAL/MENTAL HEALTH CLINIC | Facility: CLINIC | Age: 39
End: 2024-07-22
Payer: COMMERCIAL

## 2024-07-22 DIAGNOSIS — F41.1 GENERALIZED ANXIETY DISORDER: Primary | ICD-10-CM

## 2024-07-22 DIAGNOSIS — F32.9 MAJOR DEPRESSIVE DISORDER WITH CURRENT ACTIVE EPISODE, UNSPECIFIED DEPRESSION EPISODE SEVERITY, UNSPECIFIED WHETHER RECURRENT: ICD-10-CM

## 2024-07-22 PROCEDURE — 90834 PSYTX W PT 45 MINUTES: CPT | Performed by: SOCIAL WORKER

## 2024-07-22 NOTE — PSYCH
Virtual Regular Visit    Verification of patient location:    Patient is located at Home in the following state in which I hold an active license [place]      Assessment/Plan:    Problem List Items Addressed This Visit       Anxiety disorder - Primary    Depression       Goals addressed in session: Goal 1          Reason for visit is   Chief Complaint   Patient presents with    Virtual Regular Visit          Encounter provider Claribel Denton      Recent Visits  No visits were found meeting these conditions.  Showing recent visits within past 7 days and meeting all other requirements  Today's Visits  Date Type Provider Dept   07/22/24 Telemedicine Claribel Denton [facility] [place]   Showing today's visits and meeting all other requirements  Future Appointments  No visits were found meeting these conditions.  Showing future appointments within next 150 days and meeting all other requirements       The patient was identified by name and date of birth. Candi Marshall was informed that this is a telemedicine visit and that the visit is being conducted through the Epic Embedded platform. She agrees to proceed..  My office door was closed. No one else was in the room.  She acknowledged consent and understanding of privacy and security of the video platform. The patient has agreed to participate and understands they can discontinue the visit at any time.    Patient is aware this is a billable service.       HPI     Past Medical History:   Diagnosis Date    Anxiety     Asthma     hx-infrequent as adult    Back pain     history of sports injuries    Depression     Eating disorder     GERD (gastroesophageal reflux disease)     PONV (postoperative nausea and vomiting)        Past Surgical History:   Procedure Laterality Date    BREAST BIOPSY Right 06/06/2023    BREAST LUMPECTOMY Right 08/04/2023    Procedure: LUMPECTOMY BREAST TONYA LOCALIZED;  Surgeon: Tova Childs MD;  Location: [facility];   Service: Surgical Oncology    EGD      US BREAST CLIP NEEDLE LOC RIGHT Right 07/14/2023    US BREAST NEEDLE LOC LEFT Left 05/09/2012    US GUIDED BREAST BIOPSY RIGHT COMPLETE Right 06/06/2023    WISDOM TOOTH EXTRACTION         Current Outpatient Medications   Medication Sig Dispense Refill    albuterol (PROVENTIL HFA,VENTOLIN HFA) 90 mcg/act inhaler Inhale 2 puffs every 4 (four) hours as needed for wheezing or shortness of breath 18 g 11    azelastine (ASTELIN) 0.1 % nasal spray 2 sprays into each nostril as needed        benzonatate (TESSALON) 200 MG capsule Take 1 capsule (200 mg total) by mouth 3 (three) times a day as needed for cough 20 capsule 0    budesonide-formoterol (SYMBICORT) 160-4.5 mcg/act inhaler Inhale 2 puffs 2 (two) times a day Rinse mouth after use. 10.2 g 5    cetirizine (ZyrTEC) 10 mg tablet Take 1 tablet (10 mg total) by mouth daily (Patient taking differently: Take 10 mg by mouth as needed) 90 tablet 3    escitalopram (LEXAPRO) 10 mg tablet TAKE 1.5 TABLETS BY MOUTH DAILY 135 tablet 1    famotidine (PEPCID) 20 mg tablet Take 1 tablet (20 mg total) by mouth daily (Patient taking differently: Take 20 mg by mouth daily at bedtime) 90 tablet 3    hydrocortisone (ANUSOL-HC) 2.5 % rectal cream Apply topically 2 (two) times a day (Patient not taking: Reported on 3/8/2024) 28 g 0    hyoscyamine (LEVSIN/SL) 0.125 mg SL tablet Take 1 tablet (0.125 mg total) by mouth every 6 (six) hours as needed for cramping or diarrhea 60 tablet 5    Lactobacillus Rhamnosus, GG, (CULTURELLE PO) Take by mouth      Lidocaine, Anorectal, 5 % CREA Apply topically 3 (three) times a day as needed (pain) 113 g 0    metoprolol succinate (TOPROL-XL) 25 mg 24 hr tablet TAKE ONE TABLET BY MOUTH EVERY DAY 90 tablet 1    multivitamin (THERAGRAN) TABS Take 1 tablet by mouth daily      Na Sulfate-K Sulfate-Mg Sulf 17.5-3.13-1.6 GM/177ML SOLN PLEASE FOLLOW INSTRUCTIONS PROVIDED BY PHYSICIAN'S OFFICE (Patient not taking: Reported on  1/19/2024)      omeprazole (PriLOSEC) 20 mg delayed release capsule Take 1 capsule (20 mg total) by mouth daily 90 capsule 3     No current facility-administered medications for this visit.        Allergies   Allergen Reactions    Bee Venom Rash     Cellulitis    Codeine      Other reaction(s): Nausea and/or vomiting    Oxycodone-Acetaminophen Vomiting       Review of Systems    Video Exam    There were no vitals filed for this visit.    Physical Exam     Behavioral Health Psychotherapy Progress Note    Psychotherapy Provided: Individual Psychotherapy     1. Generalized anxiety disorder        2. Major depressive disorder with current active episode, unspecified depression episode severity, unspecified whether recurrent            Goals addressed in session: Goal 1     DATA: Keiry stated that things continue to be challenging. She stated that their roommate remains in the ICU and that there is very little chance that he will recover as he is on life support. She stated that his parents seem to remain in denial which makes it very difficult. She stated that she and her  continue to mourn the loss of their dog but are enjoying the challenges of their new puppy. She stated that her  has been struggling with pain now that it has been determined that there are no other organic cause for th episodes that he has bene experiencing. She stated that she continues to focus on reduction of alcohol use and is process all of the stress that she has been experiencing. She discussed wanting more information on the Share program for herself and her . She requested hat the clinician reach out to the program. Giving supportive therapy  During this session, this clinician used the following therapeutic modalities: Cognitive Behavioral Therapy and Supportive Psychotherapy    Substance Abuse was addressed during this session. If the client is diagnosed with a co-occurring substance use disorder, please indicate any  "changes in the frequency or amount of use: Currently monitoring her use. Stage of change for addressing substance use diagnoses: Preparation    ASSESSMENT:  Candi Marshall presents with a Euthymic/ normal mood.     her affect is Normal range and intensity, which is congruent, with her mood and the content of the session. The client has made progress on their goals.    Continues to increase use of healthy coping mechanisms Candi Marshall presents with a none risk of suicide, none risk of self-harm, and none risk of harm to others.    For any risk assessment that surpasses a \"low\" rating, a safety plan must be developed.    A safety plan was indicated: no  If yes, describe in detail N/A    PLAN: Between sessions, Candi Marshall will continue to process her emotions. At the next session, the therapist will use Cognitive Behavioral Therapy and Supportive Psychotherapy to address treatment goals.    Behavioral Health Treatment Plan and Discharge Planning: Candi Marshall is aware of and agrees to continue to work on their treatment plan. They have identified and are working toward their discharge goals. yes    Visit start and stop times:    07/22/24  Start Time: 1109  Stop Time: 1152  Total Visit Time: 43 minutes        "

## 2024-08-01 ENCOUNTER — APPOINTMENT (OUTPATIENT)
Dept: RADIOLOGY | Facility: CLINIC | Age: 39
End: 2024-08-01
Payer: COMMERCIAL

## 2024-08-01 ENCOUNTER — OFFICE VISIT (OUTPATIENT)
Dept: PODIATRY | Facility: CLINIC | Age: 39
End: 2024-08-01
Payer: COMMERCIAL

## 2024-08-01 VITALS
SYSTOLIC BLOOD PRESSURE: 119 MMHG | HEIGHT: 68 IN | BODY MASS INDEX: 26.37 KG/M2 | DIASTOLIC BLOOD PRESSURE: 68 MMHG | WEIGHT: 174 LBS

## 2024-08-01 DIAGNOSIS — M79.672 PAIN IN LEFT FOOT: ICD-10-CM

## 2024-08-01 DIAGNOSIS — M72.2 PLANTAR FASCIITIS, LEFT: Primary | ICD-10-CM

## 2024-08-01 DIAGNOSIS — M72.2 PLANTAR FASCIITIS, LEFT: ICD-10-CM

## 2024-08-01 PROCEDURE — 99202 OFFICE O/P NEW SF 15 MIN: CPT | Performed by: PODIATRIST

## 2024-08-01 PROCEDURE — 73630 X-RAY EXAM OF FOOT: CPT

## 2024-08-01 RX ORDER — MELOXICAM 15 MG/1
15 TABLET ORAL DAILY
Qty: 30 TABLET | Refills: 1 | Status: SHIPPED | OUTPATIENT
Start: 2024-08-01 | End: 2024-09-30

## 2024-08-01 NOTE — PROGRESS NOTES
Assessment/Plan:   Treatment options for left heel pain discussed with patient in detail.  Recommend we obtain an x-ray to evaluate for underlying osseous pathology and in conjunction with that proceed with a conservative treatment approach consisting of stretching icing range of motion and anti-inflammatories with some shoe gear modification.  Dispensed instructions on appropriate stretching icing and range of motion  Rx meloxicam 15 mg once a day to help reduce chronic inflammation.  Rx x-ray left foot complete  X-ray complete left foot; no signs of fracture or dislocation.  Cortical margins intact.  Pes cavus type foot type.  No degenerative changes noted.  No heel spurs noted.  Shoe gear modifications discussed though patient's current Osorio with arch supports are most likely adequate.       Diagnoses and all orders for this visit:    Plantar fasciitis, left  -     XR foot 3+ vw left; Future  -     meloxicam (Mobic) 15 mg tablet; Take 1 tablet (15 mg total) by mouth daily    Pain in left foot        Subjective:     Patient ID: Candi Marshall is a 39 y.o. female.    8/1/2024: 39-year-old female seen today for acutely painful left heel for 3 weeks duration.  Patient denies any trauma.  Reports that morning pain and after rest is the worst.  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      Review of Systems   Constitutional: Negative.    HENT: Negative.     Eyes: Negative.    Respiratory: Negative.     Cardiovascular: Negative.    Gastrointestinal: Negative.    Endocrine: Negative.    Genitourinary: Negative.    Musculoskeletal:         Moderate pain from left heel   Skin: Negative.    Allergic/Immunologic: Negative.    Neurological: Negative.    Hematological: Negative.    Psychiatric/Behavioral: Negative.           Objective:     Physical Exam  Constitutional:       Appearance: Normal appearance.   HENT:      Head: Normocephalic.      Right Ear: External ear  normal.      Left Ear: External ear normal.   Eyes:      Pupils: Pupils are equal, round, and reactive to light.   Cardiovascular:      Rate and Rhythm: Normal rate.      Pulses: Normal pulses.   Pulmonary:      Effort: Pulmonary effort is normal.   Musculoskeletal:         General: Tenderness present.      Cervical back: Normal range of motion.      Comments: Left foot: Moderate pain with palpation plantar medial insertion of plantar fascia on calcaneal tubercle.  Mild localized induration noted.  No pain with subtalar joint or ankle joint range of motion.   Feet:      Right foot:      Protective Sensation: 10 sites tested.  10 sites sensed.      Skin integrity: Skin integrity normal.      Left foot:      Protective Sensation: 10 sites tested.  10 sites sensed.      Skin integrity: Skin integrity normal.   Skin:     General: Skin is warm and dry.      Capillary Refill: Capillary refill takes 2 to 3 seconds.   Neurological:      General: No focal deficit present.      Mental Status: She is alert.   Psychiatric:         Mood and Affect: Mood normal.

## 2024-08-01 NOTE — LETTER
"PLANTAR FASCIITIS & HEEL PAIN  TENDONITIS: Achilles,  Posterior Tibial,  Peroneal,  Other……  \"Physical Therapy @ Home\" is absolutely necessary to obtain, and sustain a long lasting resolution of your heel pain or tendonitis.  If you perform the following you can greatly accelerate your recovery and reduce the chance of a recurrence, which is not uncommon.  Don't get frustrated it sometimes takes months to resolve 100%    The longer you have had this problem the longer it takes to resolve it…    MEDICATION:   If prescribed take as instructed with food, never on an empty stomach.  Stop taking if you have any side effects. (ie.Rash, GI upset, Acid Reflux/Heartburn)  Motrin/Ibuprofen or ALEVE/Naproxen   Rx NSAIDS (ie. Mobic, Diclofenac, Celebrex etc)  ICE:   Apply daily for 10 min. intervals, waiting 5 min. before next application.   Three applications over 45 min. should be done after work, or immediately following an athletic/strenuous event.  Frozen water bottles can be used to ice and massage rolling on floor.  No heat unless instructed.  STRETCHING:  Any stretching activity of  your Achilles Tendon/Calf muscles in the lower leg will also stretch your Plantar Fasciia.    Hold stretch for 8-10 sec., Repeat 10-12 times per setting, for (5-6) times per day.    Stand arms length away from a wall, place foot to be stretched half a shoe length behind the other, then step forward with the other performing a lazy push-up against the wall.  MASSAGE:  Deep friction technique using moisturizer for 10-15 min. daily.  Circles/Figure Eight's with Toes reversing direction for Tendonitis  ORTHOTICS:   Can be very helpful but varies based on foot structure   Custom or basic off the shelf products.  Many options available.  Powerstep Jackson is an effective OTC device ([employer]).  PROPER SHOE SELECTION:  Shoes must have adequate support and structural integrity.  Forget loafers, flip-flops, and slip on flats!  Lace up type shoes are " recommended due to their ability to fit orthotics/arch supports.  Some athletic type sandals are acceptable.  Running type sneakers are the best choice.   (Anthonye, New Balance 1540 or 940, ASICS, Devon Adrenaline GTS)  GENERAL ADVICE:   If it hurts slow down or stop the activity.  Avoid certain activities (jumping, climbing ladders, steep inclines/uneven terrain)    Put foot through range of motion if tight prior to getting up after inactivity/resting/sleeping.    Distance running and Treadmills can aggravate the situation and prolong recovery.  Bicycle/Elliptical is better than Jogging/Running.

## 2024-08-16 DIAGNOSIS — F41.1 GENERALIZED ANXIETY DISORDER: ICD-10-CM

## 2024-08-16 RX ORDER — ESCITALOPRAM OXALATE 10 MG/1
15 TABLET ORAL DAILY
Qty: 135 TABLET | Refills: 1 | Status: SHIPPED | OUTPATIENT
Start: 2024-08-16

## 2024-08-25 NOTE — PROGRESS NOTES
"Subjective      Candi Marshall is a 39 y.o. female who presents for annual well woman exam.  Last Pap smear 21 NILM/ HR HPV(-).  Last mammogram screening 4/3/23 resulted birads 1 left breast. Right breast follow up US and diagnostic US 6/3/24 birads 3/ Has diagnostic mammogram and right breast US scheduled 25. Periods are regular every 28-30 days, lasting  2-3  days. No intermenstrual bleeding, spotting, or discharge.    Current contraception: coitus interruptus  History of abnormal Pap smear: no  Family history of uterine or ovarian cancer: yes - maternal grandmother uterine/ maternal aunt ovarian   Regular self breast exam: yes  History of abnormal mammogram: yes   Family history of breast cancer: yes - Maternal Aunt   History of abnormal lipids: no  Gardasil vaccine: x 1       Menstrual History:  OB History          0    Para   0    Term   0       0    AB   0    Living   0         SAB   0    IAB   0    Ectopic   0    Multiple   0    Live Births   0           Obstetric Comments   Age at menarche 12              Menarche age: 12  Patient's last menstrual period was 2024 (exact date).  Period Cycle (Days):  (monthly)  Period Duration (Days): 2-3  Period Pattern: Regular  Menstrual Flow: Light, Moderate  Dysmenorrhea: (!) Mild (IBU as needed)  Dysmenorrhea Symptoms: Cramping    The following portions of the patient's history were reviewed and updated as appropriate: allergies, current medications, past family history, past medical history, past social history, past surgical history, and problem list.    Review of Systems  Pertinent items are noted in HPI.      Objective      /80   Ht 5' 8\" (1.727 m)   Wt 78.9 kg (173 lb 14.4 oz)   LMP 2024 (Exact Date)   BMI 26.44 kg/m²     General:   alert and oriented, in no acute distress, alert, appears stated age, and cooperative   Heart: regular rate and rhythm, S1, S2 normal, no murmur, click, rub or gallop   Lungs: clear to " auscultation bilaterally   Abdomen: soft, non-tender, without masses or organomegaly   Vulva: normal, Bartholin's, Urethra, [place]'s normal   Vagina: normal mucosa, normal discharge, no palpable nodules   Cervix: no bleeding following Pap, no cervical motion tenderness, and no lesions   Uterus: normal size, non-tender, normal shape and consistency   Adnexa: normal adnexa and no mass, fullness, tenderness   Breast: breasts appear normal, no suspicious masses, no skin or nipple changes or axillary nodes.              Assessment      @well woman@ .     Plan      All questions answered.  Await pap smear results.  Breast self exam technique reviewed and patient encouraged to perform self-exam monthly.  Contraception: coitus interruptus.  Diagnosis explained in detail, including differential.  Dietary diary.  Discussed healthy lifestyle modifications.  Educational material distributed.  Follow up in 1 year.  Follow up as needed.  Thin prep Pap smear.  Breast awareness reviewed   Encouraged to keep appointment for mammogram   Encouraged healthy diet, exercise and lifestyle  Encouraged follow-up with PCP as needed  Will call / mychart message with results  VBI-    BMI Counseling: Body mass index is 26.44 kg/m². The BMI is above normal. Nutrition recommendations include 3-5 servings of fruits/vegetables daily. Exercise recommendations include exercising 3-5 times per week.

## 2024-08-26 ENCOUNTER — OFFICE VISIT (OUTPATIENT)
Dept: OBGYN CLINIC | Facility: MEDICAL CENTER | Age: 39
End: 2024-08-26
Payer: COMMERCIAL

## 2024-08-26 ENCOUNTER — TELEMEDICINE (OUTPATIENT)
Dept: BEHAVIORAL/MENTAL HEALTH CLINIC | Facility: CLINIC | Age: 39
End: 2024-08-26
Payer: COMMERCIAL

## 2024-08-26 VITALS
BODY MASS INDEX: 26.36 KG/M2 | SYSTOLIC BLOOD PRESSURE: 110 MMHG | HEIGHT: 68 IN | WEIGHT: 173.9 LBS | DIASTOLIC BLOOD PRESSURE: 80 MMHG

## 2024-08-26 DIAGNOSIS — Z01.419 WELL WOMAN EXAM WITH ROUTINE GYNECOLOGICAL EXAM: Primary | ICD-10-CM

## 2024-08-26 DIAGNOSIS — F41.1 GENERALIZED ANXIETY DISORDER: Primary | ICD-10-CM

## 2024-08-26 DIAGNOSIS — F32.9 MAJOR DEPRESSIVE DISORDER WITH CURRENT ACTIVE EPISODE, UNSPECIFIED DEPRESSION EPISODE SEVERITY, UNSPECIFIED WHETHER RECURRENT: ICD-10-CM

## 2024-08-26 PROCEDURE — S0610 ANNUAL GYNECOLOGICAL EXAMINA: HCPCS | Performed by: NURSE PRACTITIONER

## 2024-08-26 PROCEDURE — G0145 SCR C/V CYTO,THINLAYER,RESCR: HCPCS | Performed by: NURSE PRACTITIONER

## 2024-08-26 PROCEDURE — 90832 PSYTX W PT 30 MINUTES: CPT | Performed by: SOCIAL WORKER

## 2024-08-26 PROCEDURE — G0476 HPV COMBO ASSAY CA SCREEN: HCPCS | Performed by: NURSE PRACTITIONER

## 2024-08-26 NOTE — BH TREATMENT PLAN
Outpatient Behavioral Health Psychotherapy Treatment Plan    Candi Lambertjama  1985     Date of Initial Psychotherapy Assessment: 4/2/21   Date of Current Treatment Plan: 08/26/24  Treatment Plan Target Date: 2/26/25  Treatment Plan Expiration Date: 2/26/25    Diagnosis:   1. Generalized anxiety disorder        2. Major depressive disorder with current active episode, unspecified depression episode severity, unspecified whether recurrent            Area(s) of Need:   Grief/loss  Stress     Long Term Goal 1 (in the client's own words): Be able to cope with the losses     Stage of Change: Action     Target Date for completion: TBD             Anticipated therapeutic modalities: CBT             People identified to complete this goal: Keriy                    Objective 1: (identify the means of measuring success in meeting the objective):               Be able to talk about it         Do not minimize what I am feeling                    Objective 2: (identify the means of measuring success in meeting the objective):               Use my support system         Set boundaries with mom      Long Term Goal 2 (in the client's own words): Be able to manage my stress        Stage of Change: Action     Target Date for completion: TBD             Anticipated therapeutic modalities: CBT             People identified to complete this goal: Keiry                    Objective 1: (identify the means of measuring success in meeting the objective):           Set boundaries with others          Be able to identify my triggers                    Objective 2: (identify the means of measuring success in meeting the objective):           Use healthy coping mechanisms for stress           I am currently under the care of a [facility] psychiatric provider: no     My [facility] psychiatric provider is: N/A     I am currently taking psychiatric medications: N/A     I feel that I will be ready for discharge from mental health care  when I reach the following (measurable goal/objective): Goals completed 90%     For children and adults who have a legal guardian:          Has there been any change to custody orders and/or guardianship status? NA. If yes, attach updated documentation.     I have created my Crisis Plan and have been offered a copy of this plan     Behavioral Health Treatment Plan [facility]: Diagnosis and Treatment Plan explained to Candi Marshall acknowledges an understanding of their diagnosis. Candi Marshall agrees to this treatment plan.     I have been offered a copy of this Treatment Plan. yes

## 2024-08-26 NOTE — PSYCH
Virtual Regular Visit    Verification of patient location:    Patient is located at Home in the following state in which I hold an active license [place]      Assessment/Plan:    Problem List Items Addressed This Visit       Anxiety disorder - Primary    Depression       Goals addressed in session: Goal 1          Reason for visit is   Chief Complaint   Patient presents with    Virtual Regular Visit          Encounter provider Claribel Denton      Recent Visits  No visits were found meeting these conditions.  Showing recent visits within past 7 days and meeting all other requirements  Today's Visits  Date Type Provider Dept   08/26/24 Telemedicine Claribel Denton [facility] [place]   Showing today's visits and meeting all other requirements  Future Appointments  No visits were found meeting these conditions.  Showing future appointments within next 150 days and meeting all other requirements       The patient was identified by name and date of birth. Candi Aguilarcharlyjama was informed that this is a telemedicine visit and that the visit is being conducted through the Epic Embedded platform. She agrees to proceed..  My office door was closed. No one else was in the room.  She acknowledged consent and understanding of privacy and security of the video platform. The patient has agreed to participate and understands they can discontinue the visit at any time.    Patient is aware this is a billable service.       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    GERD (gastroesophageal reflux disease)     PONV (postoperative nausea and vomiting)     avoids narcotics if able       Past Surgical History:   Procedure Laterality Date    BREAST BIOPSY Right 06/06/2023    BREAST LUMPECTOMY Right 08/04/2023    Procedure: LUMPECTOMY BREAST TONYA  LOCALIZED;  Surgeon: Tova Childs MD;  Location: [facility];  Service: Surgical Oncology    EGD      US BREAST CLIP NEEDLE LOC RIGHT Right 07/14/2023    US BREAST NEEDLE LOC LEFT Left 05/09/2012    US GUIDED BREAST BIOPSY RIGHT COMPLETE Right 06/06/2023    WISDOM TOOTH EXTRACTION         Current Outpatient Medications   Medication Sig Dispense Refill    albuterol (PROVENTIL HFA,VENTOLIN HFA) 90 mcg/act inhaler Inhale 2 puffs every 4 (four) hours as needed for wheezing or shortness of breath 18 g 11    azelastine (ASTELIN) 0.1 % nasal spray 2 sprays into each nostril as needed      cetirizine (ZyrTEC) 10 mg tablet Take 1 tablet (10 mg total) by mouth daily (Patient taking differently: Take 10 mg by mouth as needed) 90 tablet 3    escitalopram (LEXAPRO) 10 mg tablet TAKE 1 AND 1/2 TABLETS BY MOUTH DAILY 135 tablet 1    famotidine (PEPCID) 20 mg tablet Take 1 tablet (20 mg total) by mouth daily (Patient taking differently: Take 20 mg by mouth daily at bedtime) 90 tablet 3    hydrocortisone (ANUSOL-HC) 2.5 % rectal cream Apply topically 2 (two) times a day (Patient taking differently: Apply topically 2 (two) times a day) 28 g 0    hyoscyamine (LEVSIN/SL) 0.125 mg SL tablet Take 1 tablet (0.125 mg total) by mouth every 6 (six) hours as needed for cramping or diarrhea (Patient taking differently: Take 0.125 mg by mouth every 6 (six) hours as needed for cramping or diarrhea) 60 tablet 5    Lactobacillus Rhamnosus, GG, (CULTURELLE PO) Take by mouth      Lidocaine, Anorectal, 5 % CREA Apply topically 3 (three) times a day as needed (pain) (Patient taking differently: Apply topically 3 (three) times a day as needed (pain)) 113 g 0    meloxicam (Mobic) 15 mg tablet Take 1 tablet (15 mg total) by mouth daily 30 tablet 1    metoprolol succinate (TOPROL-XL) 25 mg 24 hr tablet TAKE ONE TABLET BY MOUTH EVERY DAY 90 tablet 1    multivitamin (THERAGRAN) TABS Take 1 tablet by mouth daily      Na Sulfate-K Sulfate-Mg Sulf  "17.5-3.13-1.6 GM/177ML SOLN PLEASE FOLLOW INSTRUCTIONS PROVIDED BY PHYSICIAN'S OFFICE (Patient not taking: Reported on 1/19/2024)      omeprazole (PriLOSEC) 20 mg delayed release capsule Take 1 capsule (20 mg total) by mouth daily 90 capsule 3     No current facility-administered medications for this visit.        Allergies   Allergen Reactions    Bee Venom Rash     Cellulitis    Codeine      Other reaction(s): Nausea and/or vomiting    Oxycodone-Acetaminophen Vomiting       Review of Systems    Video Exam    There were no vitals filed for this visit.    Physical Exam     Behavioral Health Psychotherapy Progress Note    Psychotherapy Provided: Individual Psychotherapy     1. Generalized anxiety disorder        2. Major depressive disorder with current active episode, unspecified depression episode severity, unspecified whether recurrent            Goals addressed in session: Goal 1     DATA: Keiry presented for treatment with her , John. They stated that they had \"good news\" in that he had an appointment at [facility] to address the neurological issues that he has been experiencing. They shared that he was given a diagnosis and feel that the physician was very informative and helpful. They discussed the path forward with treatment which includes CBT. Discussing the relationship between stress and our body's reaction to it. Discussing the collective stress in heir lives and ways for them to be lynette to navigate this going forward and utilize healthy coping mechanisms. Giving supportive therapy  During this session, this clinician used the following therapeutic modalities: Cognitive Behavioral Therapy and Supportive Psychotherapy    Substance Abuse was not addressed during this session. If the client is diagnosed with a co-occurring substance use disorder, please indicate any changes in the frequency or amount of use: N/A. Stage of change for addressing substance use diagnoses: No substance use/Not " "applicable    ASSESSMENT:  Candi Marshall presents with a Euthymic/ normal mood.     her affect is Normal range and intensity, which is congruent, with her mood and the content of the session. The client has made progress on their goals.    Continues to manage her stress appropriately Candi Marshall presents with a none risk of suicide, none risk of self-harm, and none risk of harm to others.    For any risk assessment that surpasses a \"low\" rating, a safety plan must be developed.    A safety plan was indicated: no  If yes, describe in detail N/A    PLAN: Between sessions, Candi Marshall will continue to mange her stress appropriately. At the next session, the therapist will use Cognitive Behavioral Therapy and Supportive Psychotherapy to address treatment goals.    Behavioral Health Treatment Plan and Discharge Planning: Candi Marshall is aware of and agrees to continue to work on their treatment plan. They have identified and are working toward their discharge goals. yes    Visit start and stop times:    08/26/24  Start Time: 1608  Stop Time: 1640  Total Visit Time: 32 minutes        "

## 2024-08-28 ENCOUNTER — TELEPHONE (OUTPATIENT)
Age: 39
End: 2024-08-28

## 2024-08-28 NOTE — TELEPHONE ENCOUNTER
Rec'd call from patient stating that Dr. John asked her to call and speak with Ivis regarding need to reschedule Novembers procedure.    Patient is aware that Ivis is out of the office today.    Ivis - please give patient a call to reschedule.    Next week is fine, she's working the next three days and won't be able to answer her phone.    Thank you.

## 2024-08-29 LAB
LAB AP GYN PRIMARY INTERPRETATION: NORMAL
Lab: NORMAL

## 2024-09-09 ENCOUNTER — APPOINTMENT (OUTPATIENT)
Dept: LAB | Facility: HOSPITAL | Age: 39
End: 2024-09-09

## 2024-09-09 DIAGNOSIS — Z00.8 HEALTH EXAMINATION IN POPULATION SURVEY: ICD-10-CM

## 2024-09-09 LAB
CHOLEST SERPL-MCNC: 213 MG/DL
HDLC SERPL-MCNC: 70 MG/DL
LDLC SERPL CALC-MCNC: 114 MG/DL (ref 0–100)
NONHDLC SERPL-MCNC: 143 MG/DL
TRIGL SERPL-MCNC: 146 MG/DL

## 2024-09-09 PROCEDURE — 80061 LIPID PANEL: CPT

## 2024-09-09 PROCEDURE — 36415 COLL VENOUS BLD VENIPUNCTURE: CPT

## 2024-09-09 PROCEDURE — 83036 HEMOGLOBIN GLYCOSYLATED A1C: CPT

## 2024-09-10 LAB
EST. AVERAGE GLUCOSE BLD GHB EST-MCNC: 108 MG/DL
HBA1C MFR BLD: 5.4 %

## 2024-09-11 ENCOUNTER — TELEPHONE (OUTPATIENT)
Dept: PLASTIC SURGERY | Facility: CLINIC | Age: 39
End: 2024-09-11

## 2024-09-17 ENCOUNTER — TELEMEDICINE (OUTPATIENT)
Dept: FAMILY MEDICINE CLINIC | Facility: CLINIC | Age: 39
End: 2024-09-17
Payer: COMMERCIAL

## 2024-09-17 DIAGNOSIS — U07.1 COVID-19: Primary | ICD-10-CM

## 2024-09-17 PROCEDURE — 99214 OFFICE O/P EST MOD 30 MIN: CPT | Performed by: NURSE PRACTITIONER

## 2024-09-17 RX ORDER — NIRMATRELVIR AND RITONAVIR 300-100 MG
3 KIT ORAL 2 TIMES DAILY
Qty: 30 TABLET | Refills: 0 | Status: SHIPPED | OUTPATIENT
Start: 2024-09-17 | End: 2024-09-22

## 2024-09-17 NOTE — ASSESSMENT & PLAN NOTE
Patient with mild illness   Will send for paxlovid  She has inhaler as needed since she feeling some sob  We discussed vitamins for supportive care, increase liquids  Follow up as needed     Orders:    nirmatrelvir & ritonavir (Paxlovid, 300/100,) tablet therapy pack; Take 3 tablets by mouth 2 (two) times a day for 5 days Take 2 nirmatrelvir tablets + 1 ritonavir tablet together per dose

## 2024-09-17 NOTE — PROGRESS NOTES
COVID-19 Outpatient Progress Note  Name: Candi Marshall      : 1985      MRN: [id number]  Encounter Provider: KAREN Farmer  Encounter Date: 2024   Encounter department: [facility]    Assessment & Plan  COVID-19  Patient with mild illness   Will send for paxlovid  She has inhaler as needed since she feeling some sob  We discussed vitamins for supportive care, increase liquids  Follow up as needed     Orders:    nirmatrelvir & ritonavir (Paxlovid, 300/100,) tablet therapy pack; Take 3 tablets by mouth 2 (two) times a day for 5 days Take 2 nirmatrelvir tablets + 1 ritonavir tablet together per dose    Disposition:     Discussed symptom directed medication options with patient. Discussed vitamin D, vitamin C, and/or zinc supplementation with patient.     Patient meets criteria for Paxlovid and they have been counseled appropriately regarding risks, benefits, side effects, and alternative treatment options. After discussion, patient agrees to treatment.    Possible side effects of Paxlovid?    Possible side effects of Paxlovid are:  - Liver Problems. Notify us right away if you start to experience loss of appetite, yellowing of your skin and the whites of eyes (jaundice), dark-colored urine, pale colored stools and itchy skin, stomach area (abdominal) pain.  - Resistance to HIV Medicines. If you have untreated HIV infection, Paxlovid may lead to some HIV medicines not working as well in the future.  - Other possible side effects include: altered sense of taste, diarrhea, high blood pressure, or muscle aches.    I have spent a total time of 7 minutes on the day of the encounter for this patient including instructions for management.          Encounter provider: KAREN Farmer     Provider located at: [address] [address] [phone]     Recent Visits  No visits were found meeting these  conditions.  Showing recent visits within past 7 days and meeting all other requirements  Today's Visits  Date Type Provider Dept   09/17/24 Telemedicine [facility]EN [facility]   Showing today's visits and meeting all other requirements  Future Appointments  No visits were found meeting these conditions.  Showing future appointments within next 150 days and meeting all other requirements    History of Present Illness      This virtual check-in was done via [employer] Embedded and patient was informed that this is a secure, HIPAA-compliant platform. She agrees to proceed.    Patient agrees to participate in a virtual check in via telephone or video visit instead of presenting to the office to address urgent/immediate medical needs. Patient is aware this is a billable service. She acknowledged consent and understanding of privacy and security of the video platform. The patient has agreed to participate and understands they can discontinue the visit at any time.    After connecting through [employer], the patient was identified by name and date of birth. Candi Marshall was informed that this was a telemedicine visit and that the exam was being conducted confidentially over secure lines. My office door was closed. No one else was in the room. Candi Marshall acknowledged consent and understanding of privacy and security of the telemedicine visit. I informed the patient that I have reviewed her record in Epic and presented the opportunity for her to ask any questions regarding the visit today. The patient agreed to participate.     Verification of patient location:  Patient is located in the following state in which I hold an active license: [place]    Subjective:   Candi Marshall is a 39 y.o. female who has been screened for COVID-19. Symptom change since last report: unchanged. Patient's symptoms include nasal congestion, rhinorrhea, cough (minimal) and shortness of breath. Patient denies  fever (98.8), chills, fatigue, malaise, sore throat, anosmia, loss of taste, chest tightness, abdominal pain, nausea, vomiting, diarrhea, myalgias and headaches.     - Date of symptom onset: 9/16/2024  - Date of positive COVID-19 test: 9/17/2024. Type of test: Home antigen.     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   HENT:  Positive for congestion, postnasal drip and rhinorrhea. Negative for sore throat.    Respiratory:  Positive for cough (minimal) and shortness of breath. Negative for chest tightness.    Gastrointestinal:  Negative for abdominal pain, diarrhea, nausea and vomiting.   Musculoskeletal:  Negative for myalgias.   Neurological:  Positive for light-headedness. Negative for headaches.     Objective     LMP 08/04/2024 (Exact Date)     Physical Exam  Constitutional:       General: She is not in acute distress.     Appearance: Normal appearance. She is well-developed. She is not ill-appearing, toxic-appearing or diaphoretic.   HENT:      Head: Normocephalic and atraumatic.      Nose: Nose normal.   Eyes:      Extraocular Movements: Extraocular movements intact.      Conjunctiva/sclera: Conjunctivae normal.   Pulmonary:      Effort: Pulmonary effort is normal. No respiratory distress.   Skin:     General: Skin is warm and dry.      Coloration: Skin is not pale.      Findings: No erythema.   Neurological:      Mental Status: She is alert and oriented to person, place, and time.   Psychiatric:         Mood and Affect: Mood normal.         Behavior: Behavior normal.

## 2024-09-18 ENCOUNTER — TELEPHONE (OUTPATIENT)
Dept: PLASTIC SURGERY | Facility: CLINIC | Age: 39
End: 2024-09-18

## 2024-10-08 ENCOUNTER — HOSPITAL ENCOUNTER (EMERGENCY)
Facility: HOSPITAL | Age: 39
Discharge: HOME | End: 2024-10-08
Attending: EMERGENCY MEDICINE
Payer: COMMERCIAL

## 2024-10-08 VITALS
SYSTOLIC BLOOD PRESSURE: 164 MMHG | DIASTOLIC BLOOD PRESSURE: 97 MMHG | BODY MASS INDEX: 25.76 KG/M2 | HEART RATE: 82 BPM | HEIGHT: 68 IN | RESPIRATION RATE: 18 BRPM | WEIGHT: 170 LBS | OXYGEN SATURATION: 100 %

## 2024-10-08 DIAGNOSIS — M54.50 ACUTE LOW BACK PAIN, UNSPECIFIED BACK PAIN LATERALITY, UNSPECIFIED WHETHER SCIATICA PRESENT: Primary | ICD-10-CM

## 2024-10-08 PROCEDURE — 99283 EMERGENCY DEPT VISIT LOW MDM: CPT

## 2024-10-08 PROCEDURE — 96374 THER/PROPH/DIAG INJ IV PUSH: CPT

## 2024-10-08 PROCEDURE — 99284 EMERGENCY DEPT VISIT MOD MDM: CPT | Performed by: EMERGENCY MEDICINE

## 2024-10-08 PROCEDURE — 96372 THER/PROPH/DIAG INJ SC/IM: CPT

## 2024-10-08 RX ORDER — METHOCARBAMOL 500 MG/1
500 TABLET, FILM COATED ORAL 3 TIMES DAILY PRN
Qty: 20 TABLET | Refills: 0 | Status: SHIPPED | OUTPATIENT
Start: 2024-10-08

## 2024-10-08 RX ORDER — LIDOCAINE 50 MG/G
1 PATCH TOPICAL ONCE
Status: DISCONTINUED | OUTPATIENT
Start: 2024-10-08 | End: 2024-10-08 | Stop reason: HOSPADM

## 2024-10-08 RX ORDER — METHOCARBAMOL 100 MG/ML
1000 INJECTION, SOLUTION INTRAMUSCULAR; INTRAVENOUS ONCE
Status: COMPLETED | OUTPATIENT
Start: 2024-10-08 | End: 2024-10-08

## 2024-10-08 RX ORDER — KETOROLAC TROMETHAMINE 30 MG/ML
30 INJECTION, SOLUTION INTRAMUSCULAR; INTRAVENOUS ONCE
Status: COMPLETED | OUTPATIENT
Start: 2024-10-08 | End: 2024-10-08

## 2024-10-08 RX ORDER — DEXAMETHASONE SODIUM PHOSPHATE 10 MG/ML
8 INJECTION, SOLUTION INTRAMUSCULAR; INTRAVENOUS ONCE
Status: COMPLETED | OUTPATIENT
Start: 2024-10-08 | End: 2024-10-08

## 2024-10-08 RX ADMIN — METHOCARBAMOL 1000 MG: 100 INJECTION INTRAMUSCULAR; INTRAVENOUS at 07:49

## 2024-10-08 RX ADMIN — LIDOCAINE 1 PATCH: 50 PATCH CUTANEOUS at 07:56

## 2024-10-08 RX ADMIN — DEXAMETHASONE SODIUM PHOSPHATE 8 MG: 10 INJECTION, SOLUTION INTRAMUSCULAR; INTRAVENOUS at 06:06

## 2024-10-08 RX ADMIN — KETOROLAC TROMETHAMINE 30 MG: 30 INJECTION, SOLUTION INTRAMUSCULAR at 06:07

## 2024-10-08 NOTE — ED PROVIDER NOTES
Final diagnoses:   Acute low back pain, unspecified back pain laterality, unspecified whether sciatica present     ED Disposition       ED Disposition   Discharge    Condition   Stable    Date/Time   Tue Oct 8, 2024  6:29 AM    Comment   Candi Marshall discharge to home/self care.                   Assessment & Plan       Medical Decision Making  There is: Severe low back pain  There is NO: Motor weakness, sensory loss, Saddle anesthesia, Recent onset of bladder dysfunction (such as urinary retention or incontinence), Recent onset of bowel incontinence  Sensory abnormalities in the bladder or rectum, Recent onset of sexual dysfunction, A loss or hyperreflexic extremities.     Problems Addressed:  Acute low back pain, unspecified back pain laterality, unspecified whether sciatica present: acute illness or injury    Risk  OTC drugs.  Prescription drug management.        ED Course as of 10/09/24 1101   Tue Oct 08, 2024   0652 Went to re-examine patient, she still in a lot of pain. Spasm-like.   Meds have not helped  Will try IV robaxin.        Medications   dexamethasone (PF) (DECADRON) injection 8 mg (8 mg Intramuscular Given 10/8/24 0606)   ketorolac (TORADOL) injection 30 mg (30 mg Intramuscular Given 10/8/24 0607)   methocarbamol (ROBAXIN) injection 1,000 mg (1,000 mg Intravenous Given 10/8/24 0749)       ED Risk Strat Scores                           SBIRT 20yo+      Flowsheet Row Most Recent Value   Initial Alcohol Screen: US AUDIT-C     1. How often do you have a drink containing alcohol? 0 Filed at: 10/08/2024 0533   2. How many drinks containing alcohol do you have on a typical day you are drinking?  0 Filed at: 10/08/2024 0533   3b. FEMALE Any Age, or MALE 65+: How often do you have 4 or more drinks on one occassion? 0 Filed at: 10/08/2024 0533   Audit-C Score 0 Filed at: 10/08/2024 0533   KELLY: How many times in the past year have you...    Used an illegal drug or used a prescription medication for  non-medical reasons? 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    GERD (gastroesophageal reflux disease)     PONV (postoperative nausea and vomiting)     avoids narcotics if able      Past Surgical History:   Procedure Laterality Date    BREAST BIOPSY Right 2023    BREAST LUMPECTOMY Right 2023    Procedure: LUMPECTOMY BREAST TONYA LOCALIZED;  Surgeon: Tova Childs MD;  Location: [facility];  Service: Surgical Oncology    EGD      US BREAST CLIP NEEDLE LOC RIGHT Right 2023    US BREAST NEEDLE LOC LEFT Left 2012    US GUIDED BREAST BIOPSY RIGHT COMPLETE Right 2023    WISDOM TOOTH EXTRACTION        Family History   Problem Relation Age of Onset    Irregular heart beat Mother         Proxysmal Atrial Tachycardia, PVC    Asthma Father     Hyperlipidemia Father     Hypertension Father     Anxiety disorder Father     Liver disease Father     No Known Problems Sister     Uterine cancer Maternal Grandmother 68    Prostate cancer Maternal Grandfather 67    No Known Problems Paternal Grandmother     Emphysema Paternal Grandfather     Ovarian cancer Maternal Aunt 67    Breast cancer Maternal Aunt 69        stage 4 metastatic breast cancer    Colon cancer Neg Hx       Social History     Tobacco Use    Smoking status: Former     Current packs/day: 0.00     Types: Cigarettes     Quit date:      Years since quittin.7     Passive exposure: Past    Smokeless tobacco:  Never   Vaping Use    Vaping status: Never Used   Substance Use Topics    Alcohol use: Yes     Alcohol/week: 7.0 standard drinks of alcohol     Types: 7 Glasses of wine per week     Comment: social    Drug use: No      E-Cigarette/Vaping    E-Cigarette Use Never User       E-Cigarette/Vaping Substances    Nicotine No     THC No     CBD No     Flavoring No     Other No     Unknown No       I have reviewed and agree with the history as documented.     Candi Marshall is a 39 y.o.  year old female  Past Medical History:  No date: Anxiety  No date: Asthma      Comment:  hx-infrequent as adult  No date: Back pain      Comment:  history of sports injuries  No date: Depression  No date: Eating disorder      Comment:  bulimea- 20's in remission  No date: GERD (gastroesophageal reflux disease)  No date: PONV (postoperative nausea and vomiting)      Comment:  avoids narcotics if able  Social History    Tobacco Use      Smoking status: Former        Packs/day: 0.00        Types: Cigarettes        Quit date:         Years since quittin.7        Passive exposure: Past      Smokeless tobacco: Never    Vaping Use      Vaping status: Never Used    Alcohol use: Yes      Alcohol/week: 7.0 standard drinks of alcohol      Types: 7 Glasses of wine per week      Comment: social    Drug use: No    Patient presents with:  Back Pain: Ambulatory w/complaint of back spasm which started yesterday @1430; history of same; took tylenol, ibuprofen and robaxin w/no relief; denies loss of bowel and/or bladder function; states pain is mainly on right side lower back  Waves of pain  No neuro component, no radiation    History obtained directly from the PATIENT              History provided by:  Patient   used: No    Back Pain  Associated symptoms: no abdominal pain, no chest pain, no dysuria and no fever        Review of Systems   Constitutional:  Negative for chills and fever.   HENT:  Negative for ear pain and sore  throat.    Eyes:  Negative for pain and visual disturbance.   Respiratory:  Negative for cough and shortness of breath.    Cardiovascular:  Negative for chest pain and palpitations.   Gastrointestinal:  Negative for abdominal pain and vomiting.   Genitourinary:  Negative for dysuria and hematuria.   Musculoskeletal:  Positive for back pain. Negative for arthralgias.   Skin:  Negative for color change and rash.   Neurological:  Negative for seizures and syncope.   All other systems reviewed and are negative.          Objective       ED Triage Vitals   Temp Pulse Blood Pressure Respirations SpO2 Patient Position - Orthostatic VS   -- 10/08/24 0531 10/08/24 0533 10/08/24 0531 10/08/24 0531 10/08/24 0531    82 164/97 18 100 % Standing      Temp src Heart Rate Source BP Location FiO2 (%) Pain Score    -- 10/08/24 0531 10/08/24 0531 -- 10/08/24 0531     Monitor Right arm  8      Vitals      Date and Time Temp Pulse SpO2 Resp BP Pain Score FACES Pain Rating User   10/08/24 0538 -- -- -- -- -- 8 -- TB   10/08/24 0533 -- -- -- -- 164/97 -- -- TB   10/08/24 0531 -- 82 100 % 18 -- 8 -- TB            Physical Exam  Vitals and nursing note reviewed.   Constitutional:       General: She is not in acute distress.     Appearance: Normal appearance. She is well-developed and normal weight.   HENT:      Head: Normocephalic and atraumatic.      Nose: Nose normal.      Mouth/Throat:      Mouth: Mucous membranes are moist.   Eyes:      Conjunctiva/sclera: Conjunctivae normal.      Pupils: Pupils are equal, round, and reactive to light.   Cardiovascular:      Rate and Rhythm: Normal rate.      Heart sounds: No murmur heard.  Pulmonary:      Effort: Pulmonary effort is normal. No respiratory distress.   Abdominal:      Palpations: Abdomen is soft.      Tenderness: There is no abdominal tenderness.   Musculoskeletal:         General: No swelling.      Cervical back: Neck supple.      Comments: Pain at the SI bilateral R>>L  No numbness or  tingly  No weakness  No bladder / bowel incontinence    Skin:     General: Skin is warm and dry.      Capillary Refill: Capillary refill takes less than 2 seconds.   Neurological:      General: No focal deficit present.      Mental Status: She is alert and oriented to person, place, and time.   Psychiatric:         Mood and Affect: Mood normal.         Thought Content: Thought content normal.         Results Reviewed       None            No orders to display       Procedures    ED Medication and Procedure Management   Prior to Admission Medications   Prescriptions Last Dose Informant Patient Reported? Taking?   Lactobacillus Rhamnosus, GG, (CULTURELLE PO)  Self Yes No   Sig: Take by mouth   Lidocaine, Anorectal, 5 % CREA  Self No No   Sig: Apply topically 3 (three) times a day as needed (pain)   Patient taking differently: Apply topically 3 (three) times a day as needed (pain)   albuterol (PROVENTIL HFA,VENTOLIN HFA) 90 mcg/act inhaler  Self No No   Sig: Inhale 2 puffs every 4 (four) hours as needed for wheezing or shortness of breath   azelastine (ASTELIN) 0.1 % nasal spray  Self Yes No   Si sprays into each nostril as needed   cetirizine (ZyrTEC) 10 mg tablet  Self No No   Sig: Take 1 tablet (10 mg total) by mouth daily   Patient taking differently: Take 10 mg by mouth as needed   escitalopram (LEXAPRO) 10 mg tablet   No No   Sig: TAKE 1 AND 1/2 TABLETS BY MOUTH DAILY   famotidine (PEPCID) 20 mg tablet  Self No No   Sig: Take 1 tablet (20 mg total) by mouth daily   Patient taking differently: Take 20 mg by mouth daily at bedtime   hydrocortisone (ANUSOL-HC) 2.5 % rectal cream  Self No No   Sig: Apply topically 2 (two) times a day   Patient taking differently: Apply topically 2 (two) times a day   hyoscyamine (LEVSIN/SL) 0.125 mg SL tablet  Self No No   Sig: Take 1 tablet (0.125 mg total) by mouth every 6 (six) hours as needed for cramping or diarrhea   Patient taking differently: Take 0.125 mg by mouth every  6 (six) hours as needed for cramping or diarrhea   meloxicam (Mobic) 15 mg tablet   No No   Sig: Take 1 tablet (15 mg total) by mouth daily   metoprolol succinate (TOPROL-XL) 25 mg 24 hr tablet   No No   Sig: TAKE ONE TABLET BY MOUTH EVERY DAY   multivitamin (THERAGRAN) TABS  Self Yes No   Sig: Take 1 tablet by mouth daily   omeprazole (PriLOSEC) 20 mg delayed release capsule  Self No No   Sig: Take 1 capsule (20 mg total) by mouth daily      Facility-Administered Medications: None     Discharge Medication List as of 10/8/2024 10:10 AM        START taking these medications    Details   methocarbamol (ROBAXIN) 500 mg tablet Take 1 tablet (500 mg total) by mouth 3 (three) times a day as needed for muscle spasms, Starting Tue 10/8/2024, Normal           CONTINUE these medications which have NOT CHANGED    Details   albuterol (PROVENTIL HFA,VENTOLIN HFA) 90 mcg/act inhaler Inhale 2 puffs every 4 (four) hours as needed for wheezing or shortness of breath, Starting Mon 10/23/2023, Normal      azelastine (ASTELIN) 0.1 % nasal spray 2 sprays into each nostril as needed, Starting Tue 6/27/2017, Historical Med      cetirizine (ZyrTEC) 10 mg tablet Take 1 tablet (10 mg total) by mouth daily, Starting Tue 3/13/2018, Until Mon 8/26/2024, No Print      escitalopram (LEXAPRO) 10 mg tablet TAKE 1 AND 1/2 TABLETS BY MOUTH DAILY, Starting Fri 8/16/2024, Normal      famotidine (PEPCID) 20 mg tablet Take 1 tablet (20 mg total) by mouth daily, Starting Fri 10/28/2022, Normal      hydrocortisone (ANUSOL-HC) 2.5 % rectal cream Apply topically 2 (two) times a day, Starting Thu 9/21/2023, Normal      hyoscyamine (LEVSIN/SL) 0.125 mg SL tablet Take 1 tablet (0.125 mg total) by mouth every 6 (six) hours as needed for cramping or diarrhea, Starting Mon 12/9/2019, Until Mon 8/26/2024 at 2359, Normal      Lactobacillus Rhamnosus, GG, (CULTURELLE PO) Take by mouth, Historical Med      Lidocaine, Anorectal, 5 % CREA Apply topically 3 (three) times  a day as needed (pain), Starting Thu 9/21/2023, Normal      meloxicam (Mobic) 15 mg tablet Take 1 tablet (15 mg total) by mouth daily, Starting Thu 8/1/2024, Until Mon 9/30/2024, Normal      metoprolol succinate (TOPROL-XL) 25 mg 24 hr tablet TAKE ONE TABLET BY MOUTH EVERY DAY, Normal      multivitamin (THERAGRAN) TABS Take 1 tablet by mouth daily, Historical Med      omeprazole (PriLOSEC) 20 mg delayed release capsule Take 1 capsule (20 mg total) by mouth daily, Starting Fri 10/28/2022, Normal             ED SEPSIS DOCUMENTATION   Time reflects when diagnosis was documented in both MDM as applicable and the Disposition within this note       Time User Action Codes Description Comment    10/8/2024  6:37 AM Jey Dalton Add [M54.50] Acute low back pain, unspecified back pain laterality, unspecified whether sciatica present                  Jey Dalton MD  10/09/24 1106

## 2024-10-08 NOTE — ED TRIAGE NOTES
Ambulatory w/complaint of back spasm which started yesterday @1430; history of same; took tylenol, ibuprofen and robaxin w/no relief; denies loss of bowel and/or bladder function; states pain is mainly on right side lower back

## 2024-10-08 NOTE — ED NOTES
Pt provided pillow per request; call bell remains within reach; declines blanket at this time; lights turned off per request for pt comfort; pt with no further needs at this time; will continue to monitor     Tania Kim RN  10/08/24 0644

## 2024-10-08 NOTE — Clinical Note
Candi Marshall was seen and treated in our emergency department on 10/8/2024.                Diagnosis: low back spasm    Candi  .    She may return on this date: 10/10/2024         If you have any questions or concerns, please don't hesitate to call.      Jese Sanchez, DO    ______________________________           _______________          _______________  Hospital Representative                              Date                                Time

## 2024-10-08 NOTE — ED NOTES
Pt resting comfortably reports decrease in muscle spasm and pain; will continue to allow pt to rest; pt with no needs at this time; call bell remains within reach; will continue to monitor     Tania Kim RN  10/08/24 0953

## 2024-10-09 ENCOUNTER — TELEPHONE (OUTPATIENT)
Dept: PHYSICAL THERAPY | Facility: OTHER | Age: 39
End: 2024-10-09

## 2024-10-15 DIAGNOSIS — R00.2 HEART PALPITATIONS: ICD-10-CM

## 2024-10-15 NOTE — TELEPHONE ENCOUNTER
Call placed to the patient per [facility] referral.     V/M left for patient to call back. Phone number and hours of business provided.      This is the 2nd attempt to reach the patient.  Will defer per protocol.

## 2024-10-16 ENCOUNTER — NURSE TRIAGE (OUTPATIENT)
Dept: PHYSICAL THERAPY | Facility: OTHER | Age: 39
End: 2024-10-16

## 2024-10-16 RX ORDER — METOPROLOL SUCCINATE 25 MG/1
TABLET, EXTENDED RELEASE ORAL
Qty: 90 TABLET | Refills: 0 | Status: SHIPPED | OUTPATIENT
Start: 2024-10-16 | End: 2024-10-24 | Stop reason: SDUPTHER

## 2024-10-16 NOTE — TELEPHONE ENCOUNTER
Called the patient to complete the triage process started today @ 10:40 am. Call went to .    Voice message left for patient to call back. Phone number and hours of business provided.     Referral Closed.  Triage will be completed if a call back is received.

## 2024-10-16 NOTE — TELEPHONE ENCOUNTER
"Patient called into [facility] today and left v/m @9:59am.    Returned patient's call @10:30am.      Additional Information   Negative: Is this related to a work injury?   Negative: Is this related to an MVA?   Negative: Are you currently recieving homecare services?    Background - Initial Assessment  Clinical complaint: ED visit on 10/08 due to Lower Back Pain mostly, on the right side. Hx of on and off back issues for 20 years. New flare up began on 10/07 (day prior ED visit). Pain only radiates to mid upper right side. No radiation down her legs \"this time\". No numbness or tingling. NKI. Not seeing a spine specialist for this pain. [facility] many [facility] and had [facility] and was established with other spine specialists. Pain is persistent. Worse when sitting for too long and when standing up from seated position or first thing in the morning. Patient described pain as stiff, cramps, spasms, sharp and dull. She takes Ibuprofen, Tylenol and Robaxin. No previous back sx.  Date of onset: 10/07/24 ( new flare up)  Frequency of pain: persistent  Quality of pain: dull, spasm, cram, stiff and sharp.    Protocols used: [facility] Protocol    "

## 2024-10-21 ENCOUNTER — TELEPHONE (OUTPATIENT)
Age: 39
End: 2024-10-21

## 2024-10-21 NOTE — TELEPHONE ENCOUNTER
Pt calling to check on upcoming surgery for 11/1, she said she wanted to reschedule it, postponing until Spring    Tfrd call to Issa (Ivis is OOO) for further assistance

## 2024-10-24 ENCOUNTER — OFFICE VISIT (OUTPATIENT)
Dept: FAMILY MEDICINE CLINIC | Facility: CLINIC | Age: 39
End: 2024-10-24
Payer: COMMERCIAL

## 2024-10-24 VITALS
DIASTOLIC BLOOD PRESSURE: 90 MMHG | WEIGHT: 171 LBS | HEART RATE: 89 BPM | BODY MASS INDEX: 25.91 KG/M2 | TEMPERATURE: 98.3 F | OXYGEN SATURATION: 98 % | HEIGHT: 68 IN | SYSTOLIC BLOOD PRESSURE: 130 MMHG | RESPIRATION RATE: 18 BRPM

## 2024-10-24 DIAGNOSIS — E78.2 MIXED HYPERLIPIDEMIA: ICD-10-CM

## 2024-10-24 DIAGNOSIS — F41.1 GENERALIZED ANXIETY DISORDER: Primary | ICD-10-CM

## 2024-10-24 DIAGNOSIS — R03.0 ELEVATED BLOOD PRESSURE READING: ICD-10-CM

## 2024-10-24 DIAGNOSIS — E55.9 VITAMIN D DEFICIENCY: ICD-10-CM

## 2024-10-24 DIAGNOSIS — R00.2 HEART PALPITATIONS: ICD-10-CM

## 2024-10-24 PROCEDURE — 99214 OFFICE O/P EST MOD 30 MIN: CPT | Performed by: PHYSICIAN ASSISTANT

## 2024-10-24 RX ORDER — ESCITALOPRAM OXALATE 20 MG/1
20 TABLET ORAL DAILY
Qty: 90 TABLET | Refills: 1 | Status: SHIPPED | OUTPATIENT
Start: 2024-10-24

## 2024-10-24 RX ORDER — METOPROLOL SUCCINATE 50 MG/1
50 TABLET, EXTENDED RELEASE ORAL DAILY
Qty: 90 TABLET | Refills: 1 | Status: SHIPPED | OUTPATIENT
Start: 2024-10-24

## 2024-10-24 NOTE — PATIENT INSTRUCTIONS
Assessment/plan:  1.  Anxiety-not at goal.  Patient is having some increased symptoms.  Would recommend titrating Lexapro to 20 mg daily.  2.  Elevated blood pressure readings-pulse rate is still quite elevated despite metoprolol 25 mg daily, would titrate as 50 mg daily and reassess in the next 2 to 4 weeks.  Patient will try to keep blood pressure readings log at home.  She will also try to get blood work completed including BMP, CBC, TSH, vitamin D level.  3.  Mixed hyperlipidemia-LDL of 114 with HDL of 70-there is some family history of elevated cholesterol but no early cardiac disease in the family.  Patient does not smoke.  Would recommend continued monitoring and healthy diet and exercise.

## 2024-10-24 NOTE — PROGRESS NOTES
Ambulatory Visit  Name: Candi Marshall      : 1985      MRN: [id number]  Encounter Provider: Clemente Johnson PA-C  Encounter Date: 10/24/2024   Encounter department: [facility]  Patient Instructions       Assessment/plan:  1.  Anxiety-not at goal.  Patient is having some increased symptoms.  Would recommend titrating Lexapro to 20 mg daily.  2.  Elevated blood pressure readings-pulse rate is still quite elevated despite metoprolol 25 mg daily, would titrate as 50 mg daily and reassess in the next 2 to 4 weeks.  Patient will try to keep blood pressure readings log at home.  She will also try to get blood work completed including BMP, CBC, TSH, vitamin D level.  3.  Mixed hyperlipidemia-LDL of 114 with HDL of 70-there is some family history of elevated cholesterol but no early cardiac disease in the family.  Patient does not smoke.  Would recommend continued monitoring and healthy diet and exercise.    Assessment & Plan       History of Present Illness     HPI: This is a 39-year-old female who presents to the office with concerns over increased anxiety symptoms and elevated blood pressure readings.  She is not sure if the blood pressure being elevated is increasing her anxiety or vice versa.  She does take Lexapro 15 mg on a daily basis.  She has been picking up some extra shifts at work.  She states she really does enjoy her job but sometimes the extra workload might be wearing on her.  She still feels that she sleeps well for the most part.  She typically sleeps at least 6 to 7 hours regularly without difficulty.  Some nights after she has had a few shifts in a row she will sleep for 13 hours.  She does wake without feeling refreshed.  She has not had any other significant life alterations or changes in her appetite.  She continues to function well at work and has been able to concentrate.  She does have some slightly elevated cholesterol, evident on recent blood work through  "yearly physical for work.  She notes that she does have a family history of high cholesterol but there is no heart disease at a young age.    Hypertension  This is a new problem. Associated symptoms include anxiety, headaches and malaise/fatigue. Pertinent negatives include no chest pain, palpitations or shortness of breath. Agents associated with hypertension include NSAIDs. Risk factors for coronary artery disease include dyslipidemia and stress. There are no compliance problems.          Review of Systems   Constitutional:  Positive for malaise/fatigue. Negative for appetite change and fever.   Respiratory:  Negative for chest tightness and shortness of breath.    Cardiovascular:  Negative for chest pain, palpitations and leg swelling.   Gastrointestinal:  Negative for abdominal pain.   Genitourinary:  Negative for difficulty urinating.   Musculoskeletal:  Negative for arthralgias and myalgias.   Skin:  Negative for wound.   Neurological:  Positive for headaches. Negative for dizziness and light-headedness.   Psychiatric/Behavioral:  Negative for dysphoric mood and sleep disturbance. The patient is not nervous/anxious.         Anxiety           Objective     /90 (BP Location: Left arm, Patient Position: Sitting, Cuff Size: Adult)   Pulse 89   Temp 98.3 °F (36.8 °C) (Temporal)   Resp 18   Ht 5' 8\" (1.727 m)   Wt 77.6 kg (171 lb)   LMP 10/01/2024 (Approximate)   SpO2 98%   BMI 26.00 kg/m²     Physical Exam  Constitutional:       General: She is not in acute distress.     Appearance: Normal appearance.   HENT:      Head: Normocephalic and atraumatic.      Right Ear: Tympanic membrane normal.      Left Ear: Tympanic membrane normal.      Nose: No congestion or rhinorrhea.   Eyes:      Conjunctiva/sclera: Conjunctivae normal.      Pupils: Pupils are equal, round, and reactive to light.   Neck:      Vascular: No carotid bruit.   Cardiovascular:      Rate and Rhythm: Normal rate and regular rhythm.      " Heart sounds: No murmur heard.  Pulmonary:      Effort: Pulmonary effort is normal. No respiratory distress.      Breath sounds: Normal breath sounds.   Abdominal:      Palpations: Abdomen is soft.   Musculoskeletal:         General: Normal range of motion.      Cervical back: Normal range of motion and neck supple. No muscular tenderness.   Lymphadenopathy:      Cervical: No cervical adenopathy.   Skin:     General: Skin is warm.      Capillary Refill: Capillary refill takes less than 2 seconds.   Neurological:      General: No focal deficit present.      Mental Status: She is alert and oriented to person, place, and time.   Psychiatric:         Mood and Affect: Mood normal.

## 2024-10-30 ENCOUNTER — HOSPITAL ENCOUNTER (EMERGENCY)
Facility: HOSPITAL | Age: 39
Discharge: HOME | End: 2024-10-30
Attending: EMERGENCY MEDICINE | Admitting: EMERGENCY MEDICINE
Payer: COMMERCIAL

## 2024-10-30 VITALS
HEART RATE: 79 BPM | RESPIRATION RATE: 18 BRPM | BODY MASS INDEX: 25.81 KG/M2 | DIASTOLIC BLOOD PRESSURE: 85 MMHG | OXYGEN SATURATION: 100 % | TEMPERATURE: 98.5 F | WEIGHT: 169.75 LBS | SYSTOLIC BLOOD PRESSURE: 130 MMHG

## 2024-10-30 DIAGNOSIS — R10.9 ABDOMINAL PAIN: ICD-10-CM

## 2024-10-30 DIAGNOSIS — R11.2 NAUSEA AND VOMITING: ICD-10-CM

## 2024-10-30 DIAGNOSIS — R19.7 DIARRHEA: Primary | ICD-10-CM

## 2024-10-30 LAB
ALBUMIN SERPL BCG-MCNC: 4.1 G/DL (ref 3.5–5)
ALP SERPL-CCNC: 62 U/L (ref 34–104)
ALT SERPL W P-5'-P-CCNC: 18 U/L (ref 7–52)
ANION GAP SERPL CALCULATED.3IONS-SCNC: 11 MMOL/L (ref 4–13)
AST SERPL W P-5'-P-CCNC: 36 U/L (ref 13–39)
BASOPHILS # BLD AUTO: 0.03 THOUSANDS/ΜL (ref 0–0.1)
BASOPHILS NFR BLD AUTO: 1 % (ref 0–1)
BILIRUB SERPL-MCNC: 0.47 MG/DL (ref 0.2–1)
BUN SERPL-MCNC: 11 MG/DL (ref 5–25)
CALCIUM SERPL-MCNC: 8.4 MG/DL (ref 8.4–10.2)
CHLORIDE SERPL-SCNC: 102 MMOL/L (ref 96–108)
CO2 SERPL-SCNC: 22 MMOL/L (ref 21–32)
CREAT SERPL-MCNC: 0.73 MG/DL (ref 0.6–1.3)
EOSINOPHIL # BLD AUTO: 0.03 THOUSAND/ΜL (ref 0–0.61)
EOSINOPHIL NFR BLD AUTO: 1 % (ref 0–6)
ERYTHROCYTE [DISTWIDTH] IN BLOOD BY AUTOMATED COUNT: 12.4 % (ref 11.6–15.1)
GFR SERPL CREATININE-BSD FRML MDRD: 102 ML/MIN/1.73SQ M
GLUCOSE SERPL-MCNC: 137 MG/DL (ref 65–140)
HCT VFR BLD AUTO: 39.7 % (ref 34.8–46.1)
HGB BLD-MCNC: 14 G/DL (ref 11.5–15.4)
IMM GRANULOCYTES # BLD AUTO: 0.02 THOUSAND/UL (ref 0–0.2)
IMM GRANULOCYTES NFR BLD AUTO: 0 % (ref 0–2)
LIPASE SERPL-CCNC: 17 U/L (ref 11–82)
LYMPHOCYTES # BLD AUTO: 0.36 THOUSANDS/ΜL (ref 0.6–4.47)
LYMPHOCYTES NFR BLD AUTO: 6 % (ref 14–44)
MCH RBC QN AUTO: 33.1 PG (ref 26.8–34.3)
MCHC RBC AUTO-ENTMCNC: 35.3 G/DL (ref 31.4–37.4)
MCV RBC AUTO: 94 FL (ref 82–98)
MONOCYTES # BLD AUTO: 0.43 THOUSAND/ΜL (ref 0.17–1.22)
MONOCYTES NFR BLD AUTO: 7 % (ref 4–12)
NEUTROPHILS # BLD AUTO: 5.11 THOUSANDS/ΜL (ref 1.85–7.62)
NEUTS SEG NFR BLD AUTO: 85 % (ref 43–75)
NRBC BLD AUTO-RTO: 0 /100 WBCS
PLATELET # BLD AUTO: 157 THOUSANDS/UL (ref 149–390)
PMV BLD AUTO: 9.3 FL (ref 8.9–12.7)
POTASSIUM SERPL-SCNC: 3.3 MMOL/L (ref 3.5–5.3)
PROT SERPL-MCNC: 6.8 G/DL (ref 6.4–8.4)
RBC # BLD AUTO: 4.23 MILLION/UL (ref 3.81–5.12)
SODIUM SERPL-SCNC: 135 MMOL/L (ref 135–147)
WBC # BLD AUTO: 5.98 THOUSAND/UL (ref 4.31–10.16)

## 2024-10-30 PROCEDURE — 99284 EMERGENCY DEPT VISIT MOD MDM: CPT

## 2024-10-30 PROCEDURE — 96375 TX/PRO/DX INJ NEW DRUG ADDON: CPT

## 2024-10-30 PROCEDURE — 83690 ASSAY OF LIPASE: CPT

## 2024-10-30 PROCEDURE — 85025 COMPLETE CBC W/AUTO DIFF WBC: CPT

## 2024-10-30 PROCEDURE — 96374 THER/PROPH/DIAG INJ IV PUSH: CPT

## 2024-10-30 PROCEDURE — 96361 HYDRATE IV INFUSION ADD-ON: CPT

## 2024-10-30 PROCEDURE — 36415 COLL VENOUS BLD VENIPUNCTURE: CPT

## 2024-10-30 PROCEDURE — 80053 COMPREHEN METABOLIC PANEL: CPT

## 2024-10-30 RX ORDER — POTASSIUM CHLORIDE 1500 MG/1
TABLET, EXTENDED RELEASE ORAL
Status: DISPENSED
Start: 2024-10-30 | End: 2024-10-30

## 2024-10-30 RX ORDER — ONDANSETRON 2 MG/ML
4 INJECTION INTRAMUSCULAR; INTRAVENOUS ONCE
Status: COMPLETED | OUTPATIENT
Start: 2024-10-30 | End: 2024-10-30

## 2024-10-30 RX ORDER — ONDANSETRON 2 MG/ML
INJECTION INTRAMUSCULAR; INTRAVENOUS
Status: DISPENSED
Start: 2024-10-30 | End: 2024-10-30

## 2024-10-30 RX ORDER — POTASSIUM CHLORIDE 1500 MG/1
20 TABLET, EXTENDED RELEASE ORAL ONCE
Status: COMPLETED | OUTPATIENT
Start: 2024-10-30 | End: 2024-10-30

## 2024-10-30 RX ORDER — ONDANSETRON 4 MG/1
4 TABLET, FILM COATED ORAL EVERY 6 HOURS
Qty: 15 TABLET | Refills: 0 | Status: SHIPPED | OUTPATIENT
Start: 2024-10-30

## 2024-10-30 RX ORDER — KETOROLAC TROMETHAMINE 30 MG/ML
INJECTION, SOLUTION INTRAMUSCULAR; INTRAVENOUS
Status: DISPENSED
Start: 2024-10-30 | End: 2024-10-30

## 2024-10-30 RX ORDER — KETOROLAC TROMETHAMINE 30 MG/ML
15 INJECTION, SOLUTION INTRAMUSCULAR; INTRAVENOUS ONCE
Status: COMPLETED | OUTPATIENT
Start: 2024-10-30 | End: 2024-10-30

## 2024-10-30 RX ADMIN — SODIUM CHLORIDE 1000 ML: 0.9 INJECTION, SOLUTION INTRAVENOUS at 01:37

## 2024-10-30 RX ADMIN — KETOROLAC TROMETHAMINE 15 MG: 30 INJECTION, SOLUTION INTRAMUSCULAR; INTRAVENOUS at 02:30

## 2024-10-30 RX ADMIN — POTASSIUM CHLORIDE 20 MEQ: 1500 TABLET, EXTENDED RELEASE ORAL at 02:19

## 2024-10-30 RX ADMIN — ONDANSETRON 4 MG: 2 INJECTION INTRAMUSCULAR; INTRAVENOUS at 01:37

## 2024-10-30 NOTE — ED PROVIDER NOTES
Time reflects when diagnosis was documented in both MDM as applicable and the Disposition within this note       Time User Action Codes Description Comment    10/30/2024  3:19 AM FelizAleksandrErick Add [R19.7] Diarrhea     10/30/2024  3:19 AM FelizErick Add [R11.2] Nausea and vomiting     10/30/2024  3:21 AM FelizAleksandrErick Add [R10.9] Abdominal pain           ED Disposition       ED Disposition   Discharge    Condition   Stable    Date/Time   Wed Oct 30, 2024  3:19 AM    Comment   Candi Marshall discharge to home/self care.                   Assessment & Plan       Medical Decision Making  39-year-old female presents to ED for evaluation of abdominal pain, nausea, vomiting, diarrhea as seen in HPI.  On physical examination patient vital signs stable.  Afebrile.  Normotensive.  Nontachycardic.  Nonhypoxic.  Alert and responding to questions appropriately.  Nontoxic-appearing.  No murmur.  Normal breath sounds.  No significant tenderness to palpation of abdomen.  Considered but do not suspect appendicitis, cholelithiasis, choledocholithiasis, or other causes of acute abdomen.  Suspect viral GI infection.  Provided supportive treatment in the ED consisting of Zofran, Toradol, IV fluids.  Did obtain labs consisting of CBC, CMP, lipase.  Lipase returned WNL.  CMP returned with potassium of 3.3 so provided oral potassium replenishment.  CBC without leukocytosis.  Patient feels much improved after supportive measures.  Patient stable for discharge.  Prescription for Zofran provided.  Advised to return to ED for new or worsening symptoms.  Follow-up with primary care further as needed.  Patient agreeable to plan.  Patient discharged.    Prior to discharge, discharge instructions were discussed with patient at bedside. Patient was provided both verbal and written instructions. Patient is understanding of the discharge instructions and is agreeable to plan of care. Return precautions were discussed with patient  "bedside, patient verbalized understanding of signs and symptoms that would necessitate return to the ED. All questions were answered. Patient was comfortable with the plan of care and discharged to home.    Portions of this chart may have been written with voice recognition software.  Occasional grammatical errors, wrong word or \"sound a like\" substitutions may have occurred due to software limitations.  Please read carefully and use context to recognize where substitutions have occurred.     Amount and/or Complexity of Data Reviewed  Labs: ordered.    Risk  Prescription drug management.             Medications   sodium chloride 0.9 % bolus 1,000 mL (0 mL Intravenous Stopped 10/30/24 0232)   ondansetron (ZOFRAN) injection 4 mg (4 mg Intravenous Given 10/30/24 0137)   potassium chloride (Klor-Con M20) CR tablet 20 mEq (20 mEq Oral Given 10/30/24 0219)   ketorolac (TORADOL) injection 15 mg (15 mg Intravenous Given 10/30/24 0230)       ED Risk Strat Scores                           SBIRT 22yo+      Flowsheet Row Most Recent Value   Initial Alcohol Screen: US AUDIT-C     1. How often do you have a drink containing alcohol? 0 Filed at: 10/30/2024 0208   2. How many drinks containing alcohol do you have on a typical day you are drinking?  0 Filed at: 10/30/2024 0208   3b. FEMALE Any Age, or MALE 65+: How often do you have 4 or more drinks on one occassion? 0 Filed at: 10/30/2024 0208   Audit-C Score 0 Filed at: 10/30/2024 0208   KELLY: How many times in the past year have you...    Used an illegal drug or used a prescription medication for non-medical reasons? Never Filed at: 10/30/2024 0208                            History of Present Illness       Chief Complaint   Patient presents with    Diarrhea     States has diarrhea since 0700 on 10/29. Also complaining of lower abd cramping and vomiting the last 2 ours.       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    GERD (gastroesophageal reflux disease)     PONV (postoperative nausea and vomiting)     avoids narcotics if able      Past Surgical History:   Procedure Laterality Date    BREAST BIOPSY Right 2023    BREAST LUMPECTOMY Right 2023    Procedure: LUMPECTOMY BREAST TONYA LOCALIZED;  Surgeon: Tova Childs MD;  Location: [facility];  Service: Surgical Oncology    EGD      US BREAST CLIP NEEDLE LOC RIGHT Right 2023    US BREAST NEEDLE LOC LEFT Left 2012    US GUIDED BREAST BIOPSY RIGHT COMPLETE Right 2023    WISDOM TOOTH EXTRACTION        Family History   Problem Relation Age of Onset    Irregular heart beat Mother         Proxysmal Atrial Tachycardia, PVC    Asthma Father     Hyperlipidemia Father     Hypertension Father     Anxiety disorder Father     Liver disease Father     No Known Problems Sister     Uterine cancer Maternal Grandmother 68    Prostate cancer Maternal Grandfather 67    No Known Problems Paternal Grandmother     Emphysema Paternal Grandfather     Ovarian cancer Maternal Aunt 67    Breast cancer Maternal Aunt 69        stage 4 metastatic breast cancer    Colon cancer Neg Hx       Social History     Tobacco Use    Smoking status: Former     Current packs/day: 0.00     Types: Cigarettes     Quit date:      Years since quittin.8     Passive exposure: Past    Smokeless tobacco: Never   Vaping Use    Vaping status: Never Used   Substance Use Topics    Alcohol use: Yes     Alcohol/week: 7.0 standard drinks of alcohol     Types: 7 Glasses of wine per week     Comment: social    Drug use: No      E-Cigarette/Vaping    E-Cigarette Use Never User       E-Cigarette/Vaping Substances    Nicotine No     THC No     CBD No     Flavoring No     Other No     Unknown No       I have reviewed and agree with the history as documented.     39-year-old female presents to ED for evaluation of  diarrhea, nausea, vomiting, abdominal pain.  Patient states that since yesterday morning she has been experiencing persistent diarrhea.  Has been treating symptoms supportively at home.  Couple hours prior to arrival to ED, patient started to experience nausea and vomiting.  Unable to keep food or fluids down.  Denies any blood in vomit or stool.  Abdominal pain is a cramping, bloated pain.  Denies history of appendectomy, cholecystectomy.  Reportedly ate grocery store sushi for 2 days prior to symptom onset.  Otherwise no changes in her diet.         Review of Systems   Constitutional:  Negative for fever.   Gastrointestinal:  Positive for abdominal pain, diarrhea, nausea and vomiting.   Musculoskeletal:  Positive for myalgias.   All other systems reviewed and are negative.          Objective       ED Triage Vitals [10/30/24 0107]   Temperature Pulse Blood Pressure Respirations SpO2 Patient Position - Orthostatic VS   98.5 °F (36.9 °C) 99 141/84 18 98 % Sitting      Temp Source Heart Rate Source BP Location FiO2 (%) Pain Score    Oral Monitor Right arm -- 4      Vitals      Date and Time Temp Pulse SpO2 Resp BP Pain Score FACES Pain Rating User   10/30/24 0321 -- 79 100 % 18 130/85 -- -- ES   10/30/24 0107 98.5 °F (36.9 °C) 99 98 % 18 141/84 4 -- RC            Physical Exam  Vitals and nursing note reviewed.   Constitutional:       General: She is not in acute distress.     Appearance: She is well-developed. She is not toxic-appearing or diaphoretic.   HENT:      Head: Normocephalic and atraumatic.   Eyes:      General: No scleral icterus.        Right eye: No discharge.         Left eye: No discharge.      Extraocular Movements: Extraocular movements intact.      Conjunctiva/sclera: Conjunctivae normal.      Pupils: Pupils are equal, round, and reactive to light.   Cardiovascular:      Rate and Rhythm: Normal rate and regular rhythm.      Pulses: Normal pulses.      Heart sounds: Normal heart sounds. No murmur  heard.     No friction rub. No gallop.   Pulmonary:      Effort: Pulmonary effort is normal. No respiratory distress.      Breath sounds: Normal breath sounds. No stridor. No wheezing, rhonchi or rales.   Abdominal:      Palpations: Abdomen is soft.      Tenderness: There is no abdominal tenderness.   Musculoskeletal:         General: No swelling.      Cervical back: Neck supple.   Skin:     General: Skin is warm and dry.      Capillary Refill: Capillary refill takes less than 2 seconds.      Coloration: Skin is not jaundiced or pale.      Findings: No rash.   Neurological:      Mental Status: She is alert.   Psychiatric:         Mood and Affect: Mood normal.         Results Reviewed       Procedure Component Value Units Date/Time    Comprehensive metabolic panel [[id number]]  (Abnormal) Collected: 10/30/24 0135    Lab Status: Final result Specimen: Blood from Hand, Left Updated: 10/30/24 0204     Sodium 135 mmol/L      Potassium 3.3 mmol/L      Chloride 102 mmol/L      CO2 22 mmol/L      ANION GAP 11 mmol/L      BUN 11 mg/dL      Creatinine 0.73 mg/dL      Glucose 137 mg/dL      Calcium 8.4 mg/dL      AST 36 U/L      ALT 18 U/L      Alkaline Phosphatase 62 U/L      Total Protein 6.8 g/dL      Albumin 4.1 g/dL      Total Bilirubin 0.47 mg/dL      eGFR 102 ml/min/1.73sq m     Narrative:      National Kid[employer] Foundation guidelines for Chronic Kidney Disease (CKD):     Stage 1 with normal or high GFR (GFR > 90 mL/min/1.73 square meters)    Stage 2 Mild CKD (GFR = 60-89 mL/min/1.73 square meters)    Stage 3A Moderate CKD (GFR = 45-59 mL/min/1.73 square meters)    Stage 3B Moderate CKD (GFR = 30-44 mL/min/1.73 square meters)    Stage 4 Severe CKD (GFR = 15-29 mL/min/1.73 square meters)    Stage 5 End Stage CKD (GFR <15 mL/min/1.73 square meters)  Note: GFR calculation is accurate only with a steady state creatinine    Lipase [[id number]]  (Normal) Collected: 10/30/24 0135    Lab Status: Final result Specimen: Blood  from Hand, Left Updated: 10/30/24 0204     Lipase 17 u/L     CBC and differential [[id number]]  (Abnormal) Collected: 10/30/24 0135    Lab Status: Final result Specimen: Blood from Hand, Left Updated: 10/30/24 0153     WBC 5.98 Thousand/uL      RBC 4.23 Million/uL      Hemoglobin 14.0 g/dL      Hematocrit 39.7 %      MCV 94 fL      MCH 33.1 pg      MCHC 35.3 g/dL      RDW 12.4 %      MPV 9.3 fL      Platelets 157 Thousands/uL      nRBC 0 /100 WBCs      Segmented % 85 %      Immature Grans % 0 %      Lymphocytes % 6 %      Monocytes % 7 %      Eosinophils Relative 1 %      Basophils Relative 1 %      Absolute Neutrophils 5.11 Thousands/µL      Absolute Immature Grans 0.02 Thousand/uL      Absolute Lymphocytes 0.36 Thousands/µL      Absolute Monocytes 0.43 Thousand/µL      Eosinophils Absolute 0.03 Thousand/µL      Basophils Absolute 0.03 Thousands/µL             No orders to display       Procedures    ED Medication and Procedure Management   Prior to Admission Medications   Prescriptions Last Dose Informant Patient Reported? Taking?   Lactobacillus Rhamnosus, GG, (CULTURELLE PO)  Self Yes No   Sig: Take by mouth   Lidocaine, Anorectal, 5 % CREA  Self No No   Sig: Apply topically 3 (three) times a day as needed (pain)   Patient taking differently: Apply topically 3 (three) times a day as needed (pain)   albuterol (PROVENTIL HFA,VENTOLIN HFA) 90 mcg/act inhaler  Self No No   Sig: Inhale 2 puffs every 4 (four) hours as needed for wheezing or shortness of breath   azelastine (ASTELIN) 0.1 % nasal spray  Self Yes No   Si sprays into each nostril as needed   cetirizine (ZyrTEC) 10 mg tablet  Self No No   Sig: Take 1 tablet (10 mg total) by mouth daily   Patient taking differently: Take 10 mg by mouth as needed   escitalopram (LEXAPRO) 20 mg tablet   No No   Sig: Take 1 tablet (20 mg total) by mouth daily   famotidine (PEPCID) 20 mg tablet  Self No No   Sig: Take 1 tablet (20 mg total) by mouth daily   Patient taking  differently: Take 20 mg by mouth daily at bedtime   hydrocortisone (ANUSOL-HC) 2.5 % rectal cream  Self No No   Sig: Apply topically 2 (two) times a day   Patient taking differently: Apply topically 2 (two) times a day   hyoscyamine (LEVSIN/SL) 0.125 mg SL tablet  Self No No   Sig: Take 1 tablet (0.125 mg total) by mouth every 6 (six) hours as needed for cramping or diarrhea   Patient taking differently: Take 0.125 mg by mouth every 6 (six) hours as needed for cramping or diarrhea   meloxicam (Mobic) 15 mg tablet  Self No No   Sig: Take 1 tablet (15 mg total) by mouth daily   methocarbamol (ROBAXIN) 500 mg tablet  Self No No   Sig: Take 1 tablet (500 mg total) by mouth 3 (three) times a day as needed for muscle spasms   metoprolol succinate (TOPROL-XL) 50 mg 24 hr tablet   No No   Sig: Take 1 tablet (50 mg total) by mouth daily   multivitamin (THERAGRAN) TABS  Self Yes No   Sig: Take 1 tablet by mouth daily   omeprazole (PriLOSEC) 20 mg delayed release capsule  Self No No   Sig: Take 1 capsule (20 mg total) by mouth daily      Facility-Administered Medications: None     Discharge Medication List as of 10/30/2024  3:21 AM        START taking these medications    Details   ondansetron (ZOFRAN) 4 mg tablet Take 1 tablet (4 mg total) by mouth every 6 (six) hours, Starting Wed 10/30/2024, Normal           CONTINUE these medications which have NOT CHANGED    Details   albuterol (PROVENTIL HFA,VENTOLIN HFA) 90 mcg/act inhaler Inhale 2 puffs every 4 (four) hours as needed for wheezing or shortness of breath, Starting Mon 10/23/2023, Normal      azelastine (ASTELIN) 0.1 % nasal spray 2 sprays into each nostril as needed, Starting Tue 6/27/2017, Historical Med      cetirizine (ZyrTEC) 10 mg tablet Take 1 tablet (10 mg total) by mouth daily, Starting Tue 3/13/2018, Until Thu 10/24/2024, No Print      escitalopram (LEXAPRO) 20 mg tablet Take 1 tablet (20 mg total) by mouth daily, Starting Thu 10/24/2024, Normal      famotidine  (PEPCID) 20 mg tablet Take 1 tablet (20 mg total) by mouth daily, Starting Fri 10/28/2022, Normal      hydrocortisone (ANUSOL-HC) 2.5 % rectal cream Apply topically 2 (two) times a day, Starting Thu 9/21/2023, Normal      hyoscyamine (LEVSIN/SL) 0.125 mg SL tablet Take 1 tablet (0.125 mg total) by mouth every 6 (six) hours as needed for cramping or diarrhea, Starting Mon 12/9/2019, Until Thu 10/24/2024 at 2359, Normal      Lactobacillus Rhamnosus, GG, (CULTURELLE PO) Take by mouth, Historical Med      Lidocaine, Anorectal, 5 % CREA Apply topically 3 (three) times a day as needed (pain), Starting Thu 9/21/2023, Normal      meloxicam (Mobic) 15 mg tablet Take 1 tablet (15 mg total) by mouth daily, Starting Thu 8/1/2024, Until Thu 10/24/2024, Normal      methocarbamol (ROBAXIN) 500 mg tablet Take 1 tablet (500 mg total) by mouth 3 (three) times a day as needed for muscle spasms, Starting Tue 10/8/2024, Normal      metoprolol succinate (TOPROL-XL) 50 mg 24 hr tablet Take 1 tablet (50 mg total) by mouth daily, Starting Thu 10/24/2024, Normal      multivitamin (THERAGRAN) TABS Take 1 tablet by mouth daily, Historical Med      omeprazole (PriLOSEC) 20 mg delayed release capsule Take 1 capsule (20 mg total) by mouth daily, Starting Fri 10/28/2022, Normal           No discharge procedures on file.  ED SEPSIS DOCUMENTATION   Time reflects when diagnosis was documented in both MDM as applicable and the Disposition within this note       Time User Action Codes Description Comment    10/30/2024  3:19 AM Erick Feliz [R19.7] Diarrhea     10/30/2024  3:19 AM Erick Feliz [R11.2] Nausea and vomiting     10/30/2024  3:21 AM Erick Feliz [R10.9] Abdominal pain                  Erick Feliz PA-C  10/30/24 [id number]

## 2024-10-30 NOTE — DISCHARGE INSTRUCTIONS
Received additional info from insurance. [employer] requires trial and failure to at least two formulary alternatives below. Can to the pt switch to the formulary alternatives? If no, please provide rationale and route encounter back to central pa team. Thanks       Today I provided prescription for Zofran.  Take as directed for treatment of nausea, vomiting.  Make sure to stay hydrated when experiencing large amounts of diarrhea.  Advance diet as tolerated.  Follow-up with primary care provider as needed.  Return to ED for new or worsening symptoms.

## 2024-10-31 ENCOUNTER — APPOINTMENT (EMERGENCY)
Dept: CT IMAGING | Facility: HOSPITAL | Age: 39
End: 2024-10-31
Payer: COMMERCIAL

## 2024-10-31 ENCOUNTER — TELEPHONE (OUTPATIENT)
Age: 39
End: 2024-10-31

## 2024-10-31 ENCOUNTER — HOSPITAL ENCOUNTER (EMERGENCY)
Facility: HOSPITAL | Age: 39
Discharge: HOME | End: 2024-10-31
Attending: EMERGENCY MEDICINE
Payer: COMMERCIAL

## 2024-10-31 VITALS
SYSTOLIC BLOOD PRESSURE: 120 MMHG | DIASTOLIC BLOOD PRESSURE: 70 MMHG | HEART RATE: 94 BPM | RESPIRATION RATE: 16 BRPM | TEMPERATURE: 100.5 F | OXYGEN SATURATION: 100 % | BODY MASS INDEX: 26.31 KG/M2 | WEIGHT: 173.06 LBS

## 2024-10-31 DIAGNOSIS — K51.00 PANCOLITIS (HCC): Primary | ICD-10-CM

## 2024-10-31 LAB
ALBUMIN SERPL BCG-MCNC: 3.8 G/DL (ref 3.5–5)
ALP SERPL-CCNC: 52 U/L (ref 34–104)
ALT SERPL W P-5'-P-CCNC: 17 U/L (ref 7–52)
ANION GAP SERPL CALCULATED.3IONS-SCNC: 9 MMOL/L (ref 4–13)
AST SERPL W P-5'-P-CCNC: 25 U/L (ref 13–39)
BASOPHILS # BLD MANUAL: 0 THOUSAND/UL (ref 0–0.1)
BASOPHILS NFR MAR MANUAL: 0 % (ref 0–1)
BILIRUB SERPL-MCNC: 0.54 MG/DL (ref 0.2–1)
BUN SERPL-MCNC: 6 MG/DL (ref 5–25)
CALCIUM SERPL-MCNC: 8.4 MG/DL (ref 8.4–10.2)
CHLORIDE SERPL-SCNC: 101 MMOL/L (ref 96–108)
CO2 SERPL-SCNC: 23 MMOL/L (ref 21–32)
CREAT SERPL-MCNC: 0.65 MG/DL (ref 0.6–1.3)
EOSINOPHIL # BLD MANUAL: 0 THOUSAND/UL (ref 0–0.4)
EOSINOPHIL NFR BLD MANUAL: 0 % (ref 0–6)
ERYTHROCYTE [DISTWIDTH] IN BLOOD BY AUTOMATED COUNT: 12.4 % (ref 11.6–15.1)
EXT PREGNANCY TEST URINE: NEGATIVE
EXT. CONTROL: NORMAL
FLUAV AG UPPER RESP QL IA.RAPID: NEGATIVE
FLUBV AG UPPER RESP QL IA.RAPID: NEGATIVE
GFR SERPL CREATININE-BSD FRML MDRD: 110 ML/MIN/1.73SQ M
GLUCOSE SERPL-MCNC: 147 MG/DL (ref 65–140)
HCT VFR BLD AUTO: 39 % (ref 34.8–46.1)
HGB BLD-MCNC: 13.5 G/DL (ref 11.5–15.4)
LYMPHOCYTES # BLD AUTO: 0.46 THOUSAND/UL (ref 0.6–4.47)
LYMPHOCYTES # BLD AUTO: 6 % (ref 14–44)
MCH RBC QN AUTO: 32.5 PG (ref 26.8–34.3)
MCHC RBC AUTO-ENTMCNC: 34.6 G/DL (ref 31.4–37.4)
MCV RBC AUTO: 94 FL (ref 82–98)
METAMYELOCYTE ABSOLUTE CT: 0.06 THOUSAND/UL (ref 0–0.1)
METAMYELOCYTES NFR BLD MANUAL: 1 % (ref 0–1)
MONOCYTES # BLD AUTO: 0.46 THOUSAND/UL (ref 0–1.22)
MONOCYTES NFR BLD: 8 % (ref 4–12)
NEUTROPHILS # BLD MANUAL: 4.76 THOUSAND/UL (ref 1.85–7.62)
NEUTS BAND NFR BLD MANUAL: 25 % (ref 0–8)
NEUTS SEG NFR BLD AUTO: 58 % (ref 43–75)
PLATELET # BLD AUTO: 144 THOUSANDS/UL (ref 149–390)
PLATELET BLD QL SMEAR: ABNORMAL
PMV BLD AUTO: 9.6 FL (ref 8.9–12.7)
POTASSIUM SERPL-SCNC: 3.4 MMOL/L (ref 3.5–5.3)
PROT SERPL-MCNC: 6.6 G/DL (ref 6.4–8.4)
RBC # BLD AUTO: 4.15 MILLION/UL (ref 3.81–5.12)
RBC MORPH BLD: NORMAL
SARS-COV+SARS-COV-2 AG RESP QL IA.RAPID: NEGATIVE
SODIUM SERPL-SCNC: 133 MMOL/L (ref 135–147)
VARIANT LYMPHS # BLD AUTO: 2 %
WBC # BLD AUTO: 5.74 THOUSAND/UL (ref 4.31–10.16)

## 2024-10-31 PROCEDURE — 87804 INFLUENZA ASSAY W/OPTIC: CPT | Performed by: EMERGENCY MEDICINE

## 2024-10-31 PROCEDURE — 96375 TX/PRO/DX INJ NEW DRUG ADDON: CPT

## 2024-10-31 PROCEDURE — 85007 BL SMEAR W/DIFF WBC COUNT: CPT | Performed by: EMERGENCY MEDICINE

## 2024-10-31 PROCEDURE — 87811 SARS-COV-2 COVID19 W/OPTIC: CPT | Performed by: EMERGENCY MEDICINE

## 2024-10-31 PROCEDURE — 36415 COLL VENOUS BLD VENIPUNCTURE: CPT | Performed by: EMERGENCY MEDICINE

## 2024-10-31 PROCEDURE — 96365 THER/PROPH/DIAG IV INF INIT: CPT

## 2024-10-31 PROCEDURE — 74177 CT ABD & PELVIS W/CONTRAST: CPT

## 2024-10-31 PROCEDURE — 99285 EMERGENCY DEPT VISIT HI MDM: CPT | Performed by: EMERGENCY MEDICINE

## 2024-10-31 PROCEDURE — 99284 EMERGENCY DEPT VISIT MOD MDM: CPT

## 2024-10-31 PROCEDURE — 81025 URINE PREGNANCY TEST: CPT | Performed by: EMERGENCY MEDICINE

## 2024-10-31 PROCEDURE — 85027 COMPLETE CBC AUTOMATED: CPT | Performed by: EMERGENCY MEDICINE

## 2024-10-31 PROCEDURE — 87505 NFCT AGENT DETECTION GI: CPT | Performed by: EMERGENCY MEDICINE

## 2024-10-31 PROCEDURE — 80053 COMPREHEN METABOLIC PANEL: CPT | Performed by: EMERGENCY MEDICINE

## 2024-10-31 RX ORDER — ACETAMINOPHEN 325 MG/1
975 TABLET ORAL ONCE
Status: COMPLETED | OUTPATIENT
Start: 2024-10-31 | End: 2024-10-31

## 2024-10-31 RX ORDER — ONDANSETRON 2 MG/ML
4 INJECTION INTRAMUSCULAR; INTRAVENOUS ONCE
Status: COMPLETED | OUTPATIENT
Start: 2024-10-31 | End: 2024-10-31

## 2024-10-31 RX ORDER — SODIUM CHLORIDE, SODIUM GLUCONATE, SODIUM ACETATE, POTASSIUM CHLORIDE, MAGNESIUM CHLORIDE, SODIUM PHOSPHATE, DIBASIC, AND POTASSIUM PHOSPHATE .53; .5; .37; .037; .03; .012; .00082 G/100ML; G/100ML; G/100ML; G/100ML; G/100ML; G/100ML; G/100ML
1000 INJECTION, SOLUTION INTRAVENOUS ONCE
Status: COMPLETED | OUTPATIENT
Start: 2024-10-31 | End: 2024-10-31

## 2024-10-31 RX ORDER — ONDANSETRON 4 MG/1
4 TABLET, ORALLY DISINTEGRATING ORAL EVERY 8 HOURS PRN
Qty: 12 TABLET | Refills: 0 | Status: SHIPPED | OUTPATIENT
Start: 2024-10-31

## 2024-10-31 RX ADMIN — ONDANSETRON 4 MG: 2 INJECTION INTRAMUSCULAR; INTRAVENOUS at 12:46

## 2024-10-31 RX ADMIN — IOHEXOL 100 ML: 350 INJECTION, SOLUTION INTRAVENOUS at 15:23

## 2024-10-31 RX ADMIN — ACETAMINOPHEN 975 MG: 325 TABLET, FILM COATED ORAL at 13:38

## 2024-10-31 RX ADMIN — SODIUM CHLORIDE, SODIUM GLUCONATE, SODIUM ACETATE, POTASSIUM CHLORIDE, MAGNESIUM CHLORIDE, SODIUM PHOSPHATE, DIBASIC, AND POTASSIUM PHOSPHATE 1000 ML: .53; .5; .37; .037; .03; .012; .00082 INJECTION, SOLUTION INTRAVENOUS at 12:45

## 2024-10-31 NOTE — ED PROVIDER NOTES
Time reflects when diagnosis was documented in both MDM as applicable and the Disposition within this note       Time User Action Codes Description Comment    10/31/2024  5:26 PM Sparkle Calderon Add [K51.00] Pancolitis (HCC)           ED Disposition       ED Disposition   Discharge    Condition   Stable    Date/Time   Thu Oct 31, 2024  5:24 PM    Comment   Candi Marshall discharge to home/self care.                   Assessment & Plan       Medical Decision Making  Will get labs to r/o acute life threatening metabolic abnl, significant leukocytosis or anemia. Pt just had negative viral swab a couple of days ago w/ essentially negative w/u.  D/w pt and decided to hold imaging for now pending labs.  May need imaging if sig leukocytosis    Problems Addressed:  Pancolitis (HCC): acute illness or injury that poses a threat to life or bodily functions     Details: Able to tolerate po.  Given bandemia, will tx w/ abx  Obtained stool for testing (c diff, enteric panel)  Given return precautions    Amount and/or Complexity of Data Reviewed  Independent Historian: spouse  External Data Reviewed: notes.     Details: Prior visit reviewed  Labs: ordered. Decision-making details documented in ED Course.  Radiology: ordered. Decision-making details documented in ED Course.    Risk  OTC drugs.  Prescription drug management.        ED Course as of 10/31/24 2110   Thu Oct 31, 2024   1324 WBC: 5.74   1350 Creatinine: 0.65  baseline   1400 Feeling better   1429 Bands %(!): 25  D/w pt.  Given abd tenderness and persistence of symptoms, will get CT a/p for further evaluation. Additionally, will at  to r/o infection   1602 CT abdomen pelvis with contrast  CT w/ evidence of pancolitis    Pt offered admission and wants to go home    Will PO challenge pt. If able to tolerate po will dc w/ abx/return precautions. If unable to tolerate po, will admit   1708 Pt w/ additional episodes of diarrhea  Culture/cdiff ordered   1723 D/w pt.   Tolerated crackers, wants to go home  D/w pt diet, return precautions       Medications   multi-electrolyte (ISOLYTE-S PH 7.4) bolus 1,000 mL (0 mL Intravenous Stopped 10/31/24 1341)   ondansetron (ZOFRAN) injection 4 mg (4 mg Intravenous Given 10/31/24 1246)   acetaminophen (TYLENOL) tablet 975 mg (975 mg Oral Given 10/31/24 1338)   iohexol (OMNIPAQUE) 350 MG/ML injection (MULTI-DOSE) 100 mL (100 mL Intravenous Given 10/31/24 1523)       ED Risk Strat Scores                                               History of Present Illness       Chief Complaint   Patient presents with    Diarrhea     Pt reports diarrhea since Tuesday. Reports she can tolerate fluids but states she cannot hold down solids. She was seen here 2 nights ago for the same. States she has headache and fevers at home. 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    GERD (gastroesophageal reflux disease)     PONV (postoperative nausea and vomiting)     avoids narcotics if able      Past Surgical History:   Procedure Laterality Date    BREAST BIOPSY Right 06/06/2023    BREAST LUMPECTOMY Right 08/04/2023    Procedure: LUMPECTOMY BREAST TONYA LOCALIZED;  Surgeon: Tova Childs MD;  Location: [facility];  Service: Surgical Oncology    EGD      US BREAST CLIP NEEDLE LOC RIGHT Right 07/14/2023    US BREAST NEEDLE LOC LEFT Left 05/09/2012    US GUIDED BREAST BIOPSY RIGHT COMPLETE Right 06/06/2023    WISDOM TOOTH EXTRACTION        Family History   Problem Relation Age of Onset    Irregular heart beat Mother         Proxysmal Atrial Tachycardia, PVC    Asthma Father     Hyperlipidemia Father     Hypertension Father     Anxiety disorder Father     Liver disease Father     No Known Problems Sister     Uterine cancer Maternal  Grandmother 68    Prostate cancer Maternal Grandfather 67    No Known Problems Paternal Grandmother     Emphysema Paternal Grandfather     Ovarian cancer Maternal Aunt 67    Breast cancer Maternal Aunt 69        stage 4 metastatic breast cancer    Colon cancer Neg Hx       Social History     Tobacco Use    Smoking status: Former     Current packs/day: 0.00     Types: Cigarettes     Quit date:      Years since quittin.8     Passive exposure: Past    Smokeless tobacco: Never   Vaping Use    Vaping status: Never Used   Substance Use Topics    Alcohol use: Yes     Alcohol/week: 7.0 standard drinks of alcohol     Types: 7 Glasses of wine per week     Comment: social    Drug use: No      E-Cigarette/Vaping    E-Cigarette Use Never User       E-Cigarette/Vaping Substances    Nicotine No     THC No     CBD No     Flavoring No     Other No     Unknown No       I have reviewed and agree with the history as documented.     Patient presents with:  Diarrhea: Pt reports diarrhea since Tuesday. Reports she can tolerate fluids but states she cannot hold down solids. She was seen here 2 nights ago for the same. States she has headache and fevers at home. Also reports abdominal pain that has worsened since this morning  Abdominal Pain    39y F here for evaluation of persistent abd pain, nausea, dry heaves.  Symptoms started Tuesday w/ abd pain/cramping, non-bloody diarrhea, nausea, nbnb emesis. Seen in the ED Tuesday, received IVF, PO potassium, antiemetics and felt better.  Since then symptoms have somewhat persisted.  Took imodium last night/this am and no longer w/ any diarrhea, but still having gas/bloating and abd discomfort.  Still nauseated. Reports tolerating fluids, but any times she tries anything solid, had dry heaves/vomiting.  Had fever last night to 102.     C/o ha, no cough/congestion, +malaise/fatigue.  Works in healthcare. No contacts w/ c diff, no abx use in the last couple of months, no travel, no close  contacts/family members sick.            History provided by:  Patient   used: No    Diarrhea  Associated symptoms: abdominal pain    Abdominal Pain  Associated symptoms: diarrhea        Review of Systems   Gastrointestinal:  Positive for abdominal pain and diarrhea.   All other systems reviewed and are negative.          Objective       ED Triage Vitals   Temperature Pulse Blood Pressure Respirations SpO2 Patient Position - Orthostatic VS   10/31/24 1148 10/31/24 1150 10/31/24 1150 10/31/24 1150 10/31/24 1150 10/31/24 1150   100.4 °F (38 °C) 102 125/84 19 99 % Sitting      Temp Source Heart Rate Source BP Location FiO2 (%) Pain Score    10/31/24 1148 10/31/24 1150 10/31/24 1150 -- 10/31/24 1254    Oral Monitor Right arm  2      Vitals      Date and Time Temp Pulse SpO2 Resp BP Pain Score FACES Pain Rating User   10/31/24 1759 -- 94 100 % 16 120/70 2 -- LAB   10/31/24 1705 100.5 °F (38.1 °C) -- -- -- -- -- -- LAB   10/31/24 1556 99.9 °F (37.7 °C) -- -- -- -- -- -- LAB   10/31/24 1338 -- -- -- -- -- Med Not Given for Pain - for MAR use only -- [facility]   10/31/24 1254 102.7 °F (39.3 °C) -- -- -- -- 2 -- LAB   10/31/24 1150 -- 102 99 % 19 125/84 -- -- LP   10/31/24 1148 100.4 °F (38 °C) -- -- -- -- -- -- LP            Physical Exam  Vitals and nursing note reviewed.   Constitutional:       General: She is not in acute distress.     Appearance: Normal appearance. She is not ill-appearing, toxic-appearing or diaphoretic.   HENT:      Mouth/Throat:      Mouth: Mucous membranes are dry.   Eyes:      Conjunctiva/sclera: Conjunctivae normal.   Cardiovascular:      Rate and Rhythm: Regular rhythm. Tachycardia present.      Heart sounds: No murmur heard.  Pulmonary:      Effort: Pulmonary effort is normal.      Breath sounds: Normal breath sounds.   Abdominal:      General: There is no distension.      Palpations: Abdomen is soft.      Tenderness: There is generalized abdominal tenderness. There is no guarding  or rebound.   Musculoskeletal:      Cervical back: Normal range of motion.   Skin:     General: Skin is warm.   Neurological:      General: No focal deficit present.      Mental Status: She is alert.   Psychiatric:         Mood and Affect: Mood normal.         Results Reviewed       Procedure Component Value Units Date/Time    Stool Enteric Bacterial Panel by PCR [[id number]] Collected: 10/31/24 1752    Lab Status: In process Specimen: Stool from Rectum Updated: 10/31/24 2046    Clostridium difficile toxin by PCR with EIA [[id number]] Collected: 10/31/24 1752    Lab Status: In process Specimen: Stool from Per Rectum Updated: 10/31/24 1804    Manual Differential(PHLEBS Do Not Order) [[id number]]  (Abnormal) Collected: 10/31/24 1245    Lab Status: Final result Specimen: Blood from Arm, Left Updated: 10/31/24 1420     Segmented % 58 %      Bands % 25 %      Lymphocytes % 6 %      Monocytes % 8 %      Eosinophils % 0 %      Basophils % 0 %      Metamyelocytes % 1 %      Atypical Lymphocytes % 2 %      Absolute Neutrophils 4.76 Thousand/uL      Absolute Lymphocytes 0.46 Thousand/uL      Absolute Monocytes 0.46 Thousand/uL      Absolute Eosinophils 0.00 Thousand/uL      Absolute Basophils 0.00 Thousand/uL      Absolute Metamyelocytes 0.06 Thousand/uL      Total Counted --     RBC Morphology Normal     Platelet Estimate Borderline    Comprehensive metabolic panel [[id number]]  (Abnormal) Collected: 10/31/24 1245    Lab Status: Final result Specimen: Blood from Arm, Left Updated: 10/31/24 1337     Sodium 133 mmol/L      Potassium 3.4 mmol/L      Chloride 101 mmol/L      CO2 23 mmol/L      ANION GAP 9 mmol/L      BUN 6 mg/dL      Creatinine 0.65 mg/dL      Glucose 147 mg/dL      Calcium 8.4 mg/dL      AST 25 U/L      ALT 17 U/L      Alkaline Phosphatase 52 U/L      Total Protein 6.6 g/dL      Albumin 3.8 g/dL      Total Bilirubin 0.54 mg/dL      eGFR 110 ml/min/1.73sq m     Narrative:      [employer]  guidelines for Chronic Kidney Disease (CKD):     Stage 1 with normal or high GFR (GFR > 90 mL/min/1.73 square meters)    Stage 2 Mild CKD (GFR = 60-89 mL/min/1.73 square meters)    Stage 3A Moderate CKD (GFR = 45-59 mL/min/1.73 square meters)    Stage 3B Moderate CKD (GFR = 30-44 mL/min/1.73 square meters)    Stage 4 Severe CKD (GFR = 15-29 mL/min/1.73 square meters)    Stage 5 End Stage CKD (GFR <15 mL/min/1.73 square meters)  Note: GFR calculation is accurate only with a steady state creatinine    POCT pregnancy, urine [[id number]]  (Normal) Resulted: 10/31/24 1332    Lab Status: Final result Updated: 10/31/24 1332     EXT Preg Test, Ur Negative     Control Valid    FLU/COVID Rapid Antigen (30 min. TAT) - Preferred screening test in ED [[id number]]  (Normal) Collected: 10/31/24 1245    Lab Status: Final result Specimen: Nares from Nose Updated: 10/31/24 1312     SARS COV Rapid Antigen Negative     Influenza A Rapid Antigen Negative     Influenza B Rapid Antigen Negative    Narrative:      This test has been performed using the [employer] [facility] 2 FLU+SARS Antigen test under the Emergency Use Authorization (EUA). This test has been validated by the  and verified by the performing laboratory. The [facility] uses lateral flow immunofluorescent sandwich assay to detect SARS-COV, Influenza A and Influenza B Antigen.     The [employer] [facility] 2 SARS Antigen test does not differentiate between SARS-CoV and SARS-CoV-2.     Negative results are presumptive and may be confirmed with a molecular assay, if necessary, for patient management. Negative results do not rule out SARS-CoV-2 or influenza infection and should not be used as the sole basis for treatment or patient management decisions. A negative test result may occur if the level of antigen in a sample is below the limit of detection of this test.     Positive results are indicative of the presence of viral antigens, but do not rule out bacterial infection or co-infection  with other viruses.     All test results should be used as an adjunct to clinical observations and other information available to the provider.    FOR PEDIATRIC PATIENTS - copy/paste COVID Guidelines URL to browser: [email or web address]    CBC and differential [[id number]]  (Abnormal) Collected: 10/31/24 1245    Lab Status: Final result Specimen: Blood from Arm, Left Updated: 10/31/24 1303     WBC 5.74 Thousand/uL      RBC 4.15 Million/uL      Hemoglobin 13.5 g/dL      Hematocrit 39.0 %      MCV 94 fL      MCH 32.5 pg      MCHC 34.6 g/dL      RDW 12.4 %      MPV 9.6 fL      Platelets 144 Thousands/uL             CT abdomen pelvis with contrast   ED Interpretation by Sparkle Calderon DO (10/31 160)   Small hiatal hernia. No evidence of bowel obstruction. Mild wall thickening and hyperemia over the entire colon, with mild pericolic stranding, and trace fluid in the right paracolic gutter. No abscess, perforation, pneumatosis.      Final Interpretation by Wendy Powell MD (10/31 0322)   Nonspecific pancolitis. No abscess, perforation, pneumatosis.   Hepatomegaly. Hepatic steatosis.   Incidental findings, as per the body of the report.         Workstation performed: [id number]             Procedures    ED Medication and Procedure Management   Prior to Admission Medications   Prescriptions Last Dose Informant Patient Reported? Taking?   Lactobacillus Rhamnosus, GG, (CULTURELLE PO)  Self Yes No   Sig: Take by mouth   Lidocaine, Anorectal, 5 % CREA  Self No No   Sig: Apply topically 3 (three) times a day as needed (pain)   Patient taking differently: Apply topically 3 (three) times a day as needed (pain)   albuterol (PROVENTIL HFA,VENTOLIN HFA) 90 mcg/act inhaler  Self No No   Sig: Inhale 2 puffs every 4 (four) hours as needed for wheezing or shortness of breath   azelastine (ASTELIN) 0.1 % nasal spray  Self Yes No   Si sprays into each nostril as needed    cetirizine (ZyrTEC) 10 mg tablet  Self No No   Sig: Take 1 tablet (10 mg total) by mouth daily   Patient taking differently: Take 10 mg by mouth as needed   escitalopram (LEXAPRO) 20 mg tablet   No No   Sig: Take 1 tablet (20 mg total) by mouth daily   famotidine (PEPCID) 20 mg tablet  Self No No   Sig: Take 1 tablet (20 mg total) by mouth daily   Patient taking differently: Take 20 mg by mouth daily at bedtime   hydrocortisone (ANUSOL-HC) 2.5 % rectal cream  Self No No   Sig: Apply topically 2 (two) times a day   Patient taking differently: Apply topically 2 (two) times a day   hyoscyamine (LEVSIN/SL) 0.125 mg SL tablet  Self No No   Sig: Take 1 tablet (0.125 mg total) by mouth every 6 (six) hours as needed for cramping or diarrhea   Patient taking differently: Take 0.125 mg by mouth every 6 (six) hours as needed for cramping or diarrhea   meloxicam (Mobic) 15 mg tablet  Self No No   Sig: Take 1 tablet (15 mg total) by mouth daily   methocarbamol (ROBAXIN) 500 mg tablet  Self No No   Sig: Take 1 tablet (500 mg total) by mouth 3 (three) times a day as needed for muscle spasms   metoprolol succinate (TOPROL-XL) 50 mg 24 hr tablet   No No   Sig: Take 1 tablet (50 mg total) by mouth daily   multivitamin (THERAGRAN) TABS  Self Yes No   Sig: Take 1 tablet by mouth daily   omeprazole (PriLOSEC) 20 mg delayed release capsule  Self No No   Sig: Take 1 capsule (20 mg total) by mouth daily   ondansetron (ZOFRAN) 4 mg tablet   No No   Sig: Take 1 tablet (4 mg total) by mouth every 6 (six) hours      Facility-Administered Medications: None     Discharge Medication List as of 10/31/2024  5:31 PM        START taking these medications    Details   amoxicillin-clavulanate (AUGMENTIN) 875-125 mg per tablet Take 1 tablet by mouth every 12 (twelve) hours for 7 days, Starting Thu 10/31/2024, Until Thu 11/7/2024, Normal      ondansetron (ZOFRAN-ODT) 4 mg disintegrating tablet Take 1 tablet (4 mg total) by mouth every 8 (eight) hours  as needed for nausea or vomiting, Starting Thu 10/31/2024, Normal           CONTINUE these medications which have NOT CHANGED    Details   albuterol (PROVENTIL HFA,VENTOLIN HFA) 90 mcg/act inhaler Inhale 2 puffs every 4 (four) hours as needed for wheezing or shortness of breath, Starting Mon 10/23/2023, Normal      azelastine (ASTELIN) 0.1 % nasal spray 2 sprays into each nostril as needed, Starting Tue 6/27/2017, Historical Med      cetirizine (ZyrTEC) 10 mg tablet Take 1 tablet (10 mg total) by mouth daily, Starting Tue 3/13/2018, Until Thu 10/24/2024, No Print      escitalopram (LEXAPRO) 20 mg tablet Take 1 tablet (20 mg total) by mouth daily, Starting Thu 10/24/2024, Normal      famotidine (PEPCID) 20 mg tablet Take 1 tablet (20 mg total) by mouth daily, Starting Fri 10/28/2022, Normal      hydrocortisone (ANUSOL-HC) 2.5 % rectal cream Apply topically 2 (two) times a day, Starting Thu 9/21/2023, Normal      hyoscyamine (LEVSIN/SL) 0.125 mg SL tablet Take 1 tablet (0.125 mg total) by mouth every 6 (six) hours as needed for cramping or diarrhea, Starting Mon 12/9/2019, Until Thu 10/24/2024 at 2359, Normal      Lactobacillus Rhamnosus, GG, (CULTURELLE PO) Take by mouth, Historical Med      Lidocaine, Anorectal, 5 % CREA Apply topically 3 (three) times a day as needed (pain), Starting Thu 9/21/2023, Normal      meloxicam (Mobic) 15 mg tablet Take 1 tablet (15 mg total) by mouth daily, Starting Thu 8/1/2024, Until Thu 10/24/2024, Normal      methocarbamol (ROBAXIN) 500 mg tablet Take 1 tablet (500 mg total) by mouth 3 (three) times a day as needed for muscle spasms, Starting Tue 10/8/2024, Normal      metoprolol succinate (TOPROL-XL) 50 mg 24 hr tablet Take 1 tablet (50 mg total) by mouth daily, Starting Thu 10/24/2024, Normal      multivitamin (THERAGRAN) TABS Take 1 tablet by mouth daily, Historical Med      omeprazole (PriLOSEC) 20 mg delayed release capsule Take 1 capsule (20 mg total) by mouth daily, Starting  Fri 10/28/2022, Normal      ondansetron (ZOFRAN) 4 mg tablet Take 1 tablet (4 mg total) by mouth every 6 (six) hours, Starting Wed 10/30/2024, Normal           No discharge procedures on file.  ED SEPSIS DOCUMENTATION   Time reflects when diagnosis was documented in both MDM as applicable and the Disposition within this note       Time User Action Codes Description Comment    10/31/2024  5:26 PM Sparkle Calderon Add [K51.00] Pancolitis (HCC)                  Sparkle Calderon,   10/31/24 9969

## 2024-10-31 NOTE — DISCHARGE INSTRUCTIONS
Full liquid diet while you are still having any pain.  Once the pain has resolved advance to a low fiber diet.  Once you have completed the antibiotics, resume a regular diet.     Return for any persistent vomiting, worsening pain, bloody diarrhea, or for any concerns

## 2024-10-31 NOTE — TELEPHONE ENCOUNTER
I called pt to reschedule appt that was on 1/15/25. Left v/m that I rescheduled their appt to 1/21/25 at 2:20 pm. I asked for them to call to confirm the appt or if we need to reschedule as the time and date doesn't work to let us know.

## 2024-10-31 NOTE — Clinical Note
Candi Marshall was seen and treated in our emergency department on 10/31/2024.                Diagnosis:     Candi  may return to work on return date.    She may return on this date: 11/04/2024         If you have any questions or concerns, please don't hesitate to call.      Sparkle Calderon, DO    ______________________________           _______________          _______________  Hospital Representative                              Date                                Time

## 2024-11-01 LAB
C COLI+JEJUNI TUF STL QL NAA+PROBE: POSITIVE
C DIFF TOX GENS STL QL NAA+PROBE: NEGATIVE
EC STX1+STX2 GENES STL QL NAA+PROBE: NEGATIVE
SALMONELLA SP SPAO STL QL NAA+PROBE: NEGATIVE
SHIGELLA SP+EIEC IPAH STL QL NAA+PROBE: NEGATIVE

## 2024-11-02 RX ORDER — AZITHROMYCIN 500 MG/1
500 TABLET, FILM COATED ORAL DAILY
Qty: 3 TABLET | Refills: 0 | Status: SHIPPED | OUTPATIENT
Start: 2024-11-02 | End: 2024-11-05

## 2024-11-05 ENCOUNTER — RA CDI HCC (OUTPATIENT)
Dept: OTHER | Facility: HOSPITAL | Age: 39
End: 2024-11-05

## 2024-11-13 ENCOUNTER — TELEPHONE (OUTPATIENT)
Dept: PSYCHIATRY | Facility: CLINIC | Age: 39
End: 2024-11-13

## 2024-11-13 NOTE — LETTER
24     Candi Marshall   : 1985   [address] [facility] [address]       It is the policy of [facility] to monitor and manage appointments that have been no-showed or cancelled with less than 48-hour notice. This is necessary to ensure that we are able to provide timely access for all patients to our providers. Undue numbers of unutilized appointments delays necessary medical care for all patients.      Dear Candi Marshall       We are sorry that you missed your appointment with Claribel Denton LCSW on 2024. It has been 2 months or more since your last appointment. Your health and follow-up care are important to us. We want to make you aware that you do not have another appointment with Claribel Denton LCSW scheduled. If you have already rescheduled this appointment, please disregard.    Please be aware that our office policy states that if you 'no show' two or more Therapy appointments without prior notice of cancellation within in a calendar year, you may be discharged from Therapy treatment.  We want to bring this to your attention now to prevent an interruption of your care.  If you have any questions about this policy, please call us at the number above.     If we do not hear from you within 10 business days to make a follow up appointment, we will assume you are no longer interested in care here.    Thank you in advance for your cooperation and assistance.       Sincerely,      [facility] Support Staff

## 2024-11-13 NOTE — TELEPHONE ENCOUNTER
NO-SHOW LETTER MAILED TO Candi Marshall.  ADDRESS: [address] [facility] [address]  SENT VIA [employer]

## 2024-11-14 ENCOUNTER — OFFICE VISIT (OUTPATIENT)
Age: 39
End: 2024-11-14
Payer: COMMERCIAL

## 2024-11-14 VITALS
DIASTOLIC BLOOD PRESSURE: 78 MMHG | TEMPERATURE: 97.8 F | HEIGHT: 68 IN | HEART RATE: 82 BPM | BODY MASS INDEX: 25.01 KG/M2 | WEIGHT: 165 LBS | OXYGEN SATURATION: 97 % | SYSTOLIC BLOOD PRESSURE: 100 MMHG

## 2024-11-14 DIAGNOSIS — A04.5 CAMPYLOBACTER GASTROINTESTINAL TRACT INFECTION: Primary | ICD-10-CM

## 2024-11-14 DIAGNOSIS — R19.7 DIARRHEA, UNSPECIFIED TYPE: ICD-10-CM

## 2024-11-14 DIAGNOSIS — K21.9 GASTROESOPHAGEAL REFLUX DISEASE, UNSPECIFIED WHETHER ESOPHAGITIS PRESENT: ICD-10-CM

## 2024-11-14 DIAGNOSIS — K64.9 HEMORRHOIDS, UNSPECIFIED HEMORRHOID TYPE: ICD-10-CM

## 2024-11-14 PROCEDURE — 99203 OFFICE O/P NEW LOW 30 MIN: CPT | Performed by: INTERNAL MEDICINE

## 2024-11-14 RX ORDER — SACCHAROMYCES BOULARDII 250 MG
250 CAPSULE ORAL 2 TIMES DAILY
Qty: 60 CAPSULE | Refills: 1 | Status: SHIPPED | OUTPATIENT
Start: 2024-11-14

## 2024-11-14 NOTE — ASSESSMENT & PLAN NOTE
This is longstanding but seems to be well-controlled on low-dose omeprazole and as needed famotidine.  She has had an EGD before.  Discussed that at the time of her colonoscopy (see below), it might be reasonable to do another EGD because she has had GERD for over 10 years.  We also discussed the safety of long-term PPI use.  For now, she will remain on these medications.

## 2024-11-14 NOTE — PROGRESS NOTES
Name: Candi Marshall      : 1985      MRN: [id number]  Encounter Provider: Rudi Bynum MD  Encounter Date: 2024   Encounter department: [facility]  :  Assessment & Plan  Campylobacter gastrointestinal tract infection  We discussed that this is a common cause of food poisoning.  She believes it may have been from a chicken sandwich.  Normally these infections are self resolved, but she was quite sick and febrile so antibiotic treatment was appropriate.  At this point, the infection has resolved.       Diarrhea, unspecified type  Stools have much improved but she continues to have loose stools during the day.  I suspect this is due to recent use of antibiotics or possibly postinfectious IBS.  We discussed that C. difficile is a possibility, but she reports formed stools in the morning.  1.  I think it is reasonable to try a course of probiotics.  2.  Avoid dairy products for the next couple of weeks.  3.  If the diarrhea persists or worsens, will check again for C. difficile given antibiotic use.  Orders:    saccharomyces boulardii (FLORASTOR) 250 mg capsule; Take 1 capsule (250 mg total) by mouth 2 (two) times a day    Gastroesophageal reflux disease, unspecified whether esophagitis present  This is longstanding but seems to be well-controlled on low-dose omeprazole and as needed famotidine.  She has had an EGD before.  Discussed that at the time of her colonoscopy (see below), it might be reasonable to do another EGD because she has had GERD for over 10 years.  We also discussed the safety of long-term PPI use.  For now, she will remain on these medications.       Hemorrhoids, unspecified hemorrhoid type  She reports history of intermittent rectal bleeding attributed to hemorrhoids.  No bleeding recently.  Normal hemoglobin.  No family history of colon cancer, no alarm symptoms.  Recommend screening colonoscopy at age 43.       Return as  needed.    History of Present Illness   HPI  Candi Marshall is a 39 y.o. female who presents for follow-up after recent Campylobacter infection.    She developed diarrhea and vomiting on 10/30/2024 and went to the emergency room where she received fluids and was discharged.  However, she then developed a fever and continued to have diarrhea and vomiting and return to the ER the following day.  Stool tests diagnosed Campylobacter and she was treated with Augmentin and azithromycin and her fevers resolved.    She has now completed the course of antibiotics and her diarrhea has resolved.  She now has a normal bowel movement in the morning.  However, she then has several loose bowel movements throughout the day usually after eating.  There is some urgency and she has to get to the bathroom within 15 minutes of eating.  Prior to the Campylobacter infection, she had completely normal bowel movements.    She has a standing GERD and is on omeprazole 20 mg daily and famotidine at night since 2015.  She had an EGD in 2015 was diagnosed with GERD.  She was also told to try the low FODMAP diet.  She feels that her GERD is well-controlled currently.     She also reports an episode of rectal bleeding approximately a year ago attributed to hemorrhoids.  She has not had recent bleeding.  She has not had a colonoscopy yet.  There is no family history of colon cancer.     Hemoglobin from 10/31/2024 was 13.5, MCV 94.  Creatinine was normal.  She was mildly hypokalemic in the setting of diarrhea and vomiting.  LFTs were normal.  Enteric panel was positive for Campylobacter.  C. difficile was negative.    CT was also done and I personally reviewed the images.  That showed mild diffuse wall thickening of the colon which was fluid-filled.        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    GERD (gastroesophageal reflux disease)     PONV (postoperative nausea and vomiting)     avoids narcotics if able     Past Surgical History:   Procedure Laterality Date    BREAST BIOPSY Right 06/06/2023    BREAST LUMPECTOMY Right 08/04/2023    Procedure: LUMPECTOMY BREAST TONYA LOCALIZED;  Surgeon: Tova Childs MD;  Location: [facility];  Service: Surgical Oncology    EGD      US BREAST CLIP NEEDLE LOC RIGHT Right 07/14/2023    US BREAST NEEDLE LOC LEFT Left 05/09/2012    US GUIDED BREAST BIOPSY RIGHT COMPLETE Right 06/06/2023    WISDOM TOOTH EXTRACTION       Family History   Problem Relation Age of Onset    Irregular heart beat Mother         Proxysmal Atrial Tachycardia, PVC    Asthma Father     Hyperlipidemia Father     Hypertension Father     Anxiety disorder Father     Liver disease Father     No Known Problems Sister     Uterine cancer Maternal Grandmother 68    Prostate cancer Maternal Grandfather 67    No Known Problems Paternal Grandmother     Emphysema Paternal Grandfather     Ovarian cancer Maternal Aunt 67    Breast cancer Maternal Aunt 69        stage 4 metastatic breast cancer    Colon cancer Neg Hx       reports that she quit smoking about 11 years ago. Her smoking use included cigarettes. She has been exposed to tobacco smoke. She has never used smokeless tobacco. She reports current alcohol use of about 7.0 standard drinks of alcohol per week. She reports that she does not use drugs.  Current Outpatient Medications on File Prior to Visit   Medication Sig Dispense Refill    albuterol (PROVENTIL HFA,VENTOLIN HFA) 90 mcg/act inhaler Inhale 2 puffs every 4 (four) hours as needed for wheezing or shortness of breath 18 g 11    azelastine (ASTELIN) 0.1 % nasal spray 2 sprays into each nostril as needed      escitalopram (LEXAPRO) 20 mg tablet Take 1 tablet (20 mg total) by mouth daily 90 tablet 1    famotidine (PEPCID) 20 mg tablet Take 1 tablet (20 mg  total) by mouth daily (Patient taking differently: Take 20 mg by mouth daily bedtime) 90 tablet 3    hydrocortisone (ANUSOL-HC) 2.5 % rectal cream Apply topically 2 (two) times a day (Patient taking differently: Apply topically 2 (two) times a day) 28 g 0    Lactobacillus Rhamnosus, GG, (CULTURELLE PO) Take by mouth      Lidocaine, Anorectal, 5 % CREA Apply topically 3 (three) times a day as needed (pain) (Patient taking differently: Apply topically 3 (three) times a day as needed (pain)) 113 g 0    methocarbamol (ROBAXIN) 500 mg tablet Take 1 tablet (500 mg total) by mouth 3 (three) times a day as needed for muscle spasms 20 tablet 0    metoprolol succinate (TOPROL-XL) 50 mg 24 hr tablet Take 1 tablet (50 mg total) by mouth daily 90 tablet 1    multivitamin (THERAGRAN) TABS Take 1 tablet by mouth daily      omeprazole (PriLOSEC) 20 mg delayed release capsule Take 1 capsule (20 mg total) by mouth daily 90 capsule 3    ondansetron (ZOFRAN) 4 mg tablet Take 1 tablet (4 mg total) by mouth every 6 (six) hours (Patient taking differently: Take 4 mg by mouth every 6 (six) hours PRN) 15 tablet 0    cetirizine (ZyrTEC) 10 mg tablet Take 1 tablet (10 mg total) by mouth daily (Patient taking differently: Take 10 mg by mouth as needed) 90 tablet 3    hyoscyamine (LEVSIN/SL) 0.125 mg SL tablet Take 1 tablet (0.125 mg total) by mouth every 6 (six) hours as needed for cramping or diarrhea (Patient taking differently: Take 0.125 mg by mouth every 6 (six) hours as needed for cramping or diarrhea) 60 tablet 5    meloxicam (Mobic) 15 mg tablet Take 1 tablet (15 mg total) by mouth daily 30 tablet 1    ondansetron (ZOFRAN-ODT) 4 mg disintegrating tablet Take 1 tablet (4 mg total) by mouth every 8 (eight) hours as needed for nausea or vomiting (Patient not taking: Reported on 11/14/2024) 12 tablet 0     No current facility-administered medications on file prior to visit.     Allergies   Allergen Reactions    Bee Venom Rash      "Cellulitis    Codeine      Other reaction(s): Nausea and/or vomiting    Oxycodone-Acetaminophen Vomiting           Objective   /78 (BP Location: Left arm, Patient Position: Sitting, Cuff Size: Adult)   Pulse 82   Temp 97.8 °F (36.6 °C) (Tympanic)   Ht 5' 8\" (1.727 m)   Wt 74.8 kg (165 lb)   SpO2 97%   BMI 25.09 kg/m²      Physical Exam      "

## 2024-11-14 NOTE — PROGRESS NOTES
Name: Candi Marshall      : 1985      MRN: [id number]  Encounter Provider: Rudi Bynum MD  Encounter Date: 2024   Encounter department: [facility]  :  Assessment & Plan  Diarrhea, unspecified type    Orders:    saccharomyces boulardii (FLORASTOR) 250 mg capsule; Take 1 capsule (250 mg total) by mouth 2 (two) times a day        History of Present Illness {?Quick Links Encounters * My Last Note * Last Note in Specialty * Snapshot * Since Last Visit * History :15285}  HPI  Candi Marshall is a 39 y.o. female who presents ***    Diarrhea on a Tuesday morning with vomiting. Went to ER for fluids, then returned on Thursday with fever, diarrhea, vomiting - campylobacter - augmentin and azithromycin - fevers resolved.      Currently, has formed stool in the am, then had diarrhea within 15 minutes of eating.  No fevers.  Prior to this, normal stools with occasional diarrhea.     In  had EGD and low FODMAP diet.  On omeprazole 20 mg and famotidien at night since .  GERD under control.      Had rectal bleeding and pain 1 year ago, non since.  No colonoscopy.     No FH of CRC.          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    GERD (gastroesophageal reflux disease)     PONV (postoperative nausea and vomiting)     avoids narcotics if able     Past Surgical History:   Procedure Laterality Date    BREAST BIOPSY Right 2023    BREAST LUMPECTOMY Right 2023    Procedure: LUMPECTOMY BREAST TONYA LOCALIZED;  Surgeon: Tova Childs MD;  Location: [facility];  Service: Surgical Oncology    EGD      US BREAST CLIP NEEDLE LOC RIGHT Right 2023    US BREAST NEEDLE LOC LEFT Left 2012    US GUIDED BREAST  BIOPSY RIGHT COMPLETE Right 06/06/2023    WISDOM TOOTH EXTRACTION       Family History   Problem Relation Age of Onset    Irregular heart beat Mother         Proxysmal Atrial Tachycardia, PVC    Asthma Father     Hyperlipidemia Father     Hypertension Father     Anxiety disorder Father     Liver disease Father     No Known Problems Sister     Uterine cancer Maternal Grandmother 68    Prostate cancer Maternal Grandfather 67    No Known Problems Paternal Grandmother     Emphysema Paternal Grandfather     Ovarian cancer Maternal Aunt 67    Breast cancer Maternal Aunt 69        stage 4 metastatic breast cancer    Colon cancer Neg Hx       reports that she quit smoking about 11 years ago. Her smoking use included cigarettes. She has been exposed to tobacco smoke. She has never used smokeless tobacco. She reports current alcohol use of about 7.0 standard drinks of alcohol per week. She reports that she does not use drugs.  Current Outpatient Medications on File Prior to Visit   Medication Sig Dispense Refill    albuterol (PROVENTIL HFA,VENTOLIN HFA) 90 mcg/act inhaler Inhale 2 puffs every 4 (four) hours as needed for wheezing or shortness of breath 18 g 11    azelastine (ASTELIN) 0.1 % nasal spray 2 sprays into each nostril as needed      escitalopram (LEXAPRO) 20 mg tablet Take 1 tablet (20 mg total) by mouth daily 90 tablet 1    famotidine (PEPCID) 20 mg tablet Take 1 tablet (20 mg total) by mouth daily (Patient taking differently: Take 20 mg by mouth daily bedtime) 90 tablet 3    hydrocortisone (ANUSOL-HC) 2.5 % rectal cream Apply topically 2 (two) times a day (Patient taking differently: Apply topically 2 (two) times a day) 28 g 0    Lactobacillus Rhamnosus, GG, (CULTURELLE PO) Take by mouth      Lidocaine, Anorectal, 5 % CREA Apply topically 3 (three) times a day as needed (pain) (Patient taking differently: Apply topically 3 (three) times a day as needed (pain)) 113 g 0    methocarbamol (ROBAXIN) 500 mg tablet Take  "1 tablet (500 mg total) by mouth 3 (three) times a day as needed for muscle spasms 20 tablet 0    metoprolol succinate (TOPROL-XL) 50 mg 24 hr tablet Take 1 tablet (50 mg total) by mouth daily 90 tablet 1    multivitamin (THERAGRAN) TABS Take 1 tablet by mouth daily      omeprazole (PriLOSEC) 20 mg delayed release capsule Take 1 capsule (20 mg total) by mouth daily 90 capsule 3    ondansetron (ZOFRAN) 4 mg tablet Take 1 tablet (4 mg total) by mouth every 6 (six) hours (Patient taking differently: Take 4 mg by mouth every 6 (six) hours PRN) 15 tablet 0    cetirizine (ZyrTEC) 10 mg tablet Take 1 tablet (10 mg total) by mouth daily (Patient taking differently: Take 10 mg by mouth as needed) 90 tablet 3    hyoscyamine (LEVSIN/SL) 0.125 mg SL tablet Take 1 tablet (0.125 mg total) by mouth every 6 (six) hours as needed for cramping or diarrhea (Patient taking differently: Take 0.125 mg by mouth every 6 (six) hours as needed for cramping or diarrhea) 60 tablet 5    meloxicam (Mobic) 15 mg tablet Take 1 tablet (15 mg total) by mouth daily 30 tablet 1    ondansetron (ZOFRAN-ODT) 4 mg disintegrating tablet Take 1 tablet (4 mg total) by mouth every 8 (eight) hours as needed for nausea or vomiting (Patient not taking: Reported on 11/14/2024) 12 tablet 0     No current facility-administered medications on file prior to visit.     Allergies   Allergen Reactions    Bee Venom Rash     Cellulitis    Codeine      Other reaction(s): Nausea and/or vomiting    Oxycodone-Acetaminophen Vomiting      {Select to insert medical history sections (Optional):12343}     Objective {?Quick Links Trend Vitals * Enter New Vitals * Results Review * Timeline (Adult) * Labs * Imaging * Cardiology * Procedures * Lung Cancer Screening * Surgical eConsent :23191}  /78 (BP Location: Left arm, Patient Position: Sitting, Cuff Size: Adult)   Pulse 82   Temp 97.8 °F (36.6 °C) (Tympanic)   Ht 5' 8\" (1.727 m)   Wt 74.8 kg (165 lb)   SpO2 97%   BMI " 25.09 kg/m²      Physical Exam    {Administrative / Billing Section (Optional):14960}

## 2024-12-05 ENCOUNTER — TELEPHONE (OUTPATIENT)
Age: 39
End: 2024-12-05

## 2024-12-05 NOTE — TELEPHONE ENCOUNTER
I called pt to reschedule appt that was on 1/21/25. Left v/m that I rescheduled their appt to 2/10/25 at 2 pm. I asked for them to call to confirm the appt or if we need to reschedule as the time and date doesn't work to let us know.

## 2025-01-13 DIAGNOSIS — R00.2 HEART PALPITATIONS: ICD-10-CM

## 2025-01-14 RX ORDER — METOPROLOL SUCCINATE 25 MG/1
25 TABLET, EXTENDED RELEASE ORAL DAILY
Qty: 90 TABLET | Refills: 0 | OUTPATIENT
Start: 2025-01-14

## 2025-02-04 DIAGNOSIS — F41.1 GENERALIZED ANXIETY DISORDER: ICD-10-CM

## 2025-02-04 RX ORDER — ESCITALOPRAM OXALATE 10 MG/1
15 TABLET ORAL DAILY
Qty: 135 TABLET | Refills: 1 | Status: SHIPPED | OUTPATIENT
Start: 2025-02-04

## 2025-02-10 ENCOUNTER — OFFICE VISIT (OUTPATIENT)
Age: 40
End: 2025-02-10
Payer: COMMERCIAL

## 2025-02-10 VITALS — BODY MASS INDEX: 25.04 KG/M2 | WEIGHT: 165.2 LBS | HEIGHT: 68 IN | TEMPERATURE: 98.2 F

## 2025-02-10 DIAGNOSIS — Z13.89 SCREENING FOR SKIN CONDITION: Primary | ICD-10-CM

## 2025-02-10 DIAGNOSIS — L73.8 SEBACEOUS HYPERPLASIA OF FACE: ICD-10-CM

## 2025-02-10 DIAGNOSIS — D18.01 CHERRY ANGIOMA: ICD-10-CM

## 2025-02-10 DIAGNOSIS — D22.9 MULTIPLE NEVI: ICD-10-CM

## 2025-02-10 PROCEDURE — 99203 OFFICE O/P NEW LOW 30 MIN: CPT | Performed by: DERMATOLOGY

## 2025-02-10 NOTE — PROGRESS NOTES
"[facility] Dermatology Clinic Note     Patient Name: Candi Marshall  Encounter Date: 02/10/2024     Have you been cared for by a [facility] Dermatologist in the last 3 years and, if so, which description applies to you?    NO.   I am considered a \"new\" patient and must complete all patient intake questions. I am FEMALE/of child-bearing potential.    REVIEW OF SYSTEMS:  Have you recently had or currently have any of the following? Recent fever or chills? No  Any non-healing wound? No  Are you pregnant or planning to become pregnant? No  Are you currently or planning to be nursing or breast feeding? No   PAST MEDICAL HISTORY:  Have you personally ever had or currently have any of the following?  If \"YES,\" then please provide more detail. Skin cancer (such as Melanoma, Basal Cell Carcinoma, Squamous Cell Carcinoma?  No  Tuberculosis, HIV/AIDS, Hepatitis B or C: No  Radiation Treatment No   HISTORY OF IMMUNOSUPPRESSION:   Do you have a history of any of the following:  Systemic Immunosuppression such as Diabetes, Biologic or Immunotherapy, Chemotherapy, Organ Transplantation, Bone Marrow Transplantation or Prednsione?  No    Answering \"YES\" requires the addition of the dotphrase \"IMMUNOSUPPRESSED\" as the first diagnosis of the patient's visit.   FAMILY HISTORY:  Any \"first degree relatives\" (parent, brother, sister, or child) with the following?    Skin Cancer, Pancreatic or Other Cancer? No   PATIENT EXPERIENCE:    Do you want the Dermatologist to perform a COMPLETE skin exam today including a clinical examination under the \"bra and underwear\" areas?  Yes  If necessary, do we have your permission to call and leave a detailed message on your Preferred Phone number that includes your specific medical information?  Yes      Allergies   Allergen Reactions   • Bee Venom Rash     Cellulitis   • Codeine      Other reaction(s): Nausea and/or vomiting   • Oxycodone-Acetaminophen Vomiting      Current Outpatient " Medications:   •  albuterol (PROVENTIL HFA,VENTOLIN HFA) 90 mcg/act inhaler, Inhale 2 puffs every 4 (four) hours as needed for wheezing or shortness of breath, Disp: 18 g, Rfl: 11  •  azelastine (ASTELIN) 0.1 % nasal spray, 2 sprays into each nostril as needed, Disp: , Rfl:   •  escitalopram (LEXAPRO) 20 mg tablet, Take 1 tablet (20 mg total) by mouth daily, Disp: 90 tablet, Rfl: 1  •  famotidine (PEPCID) 20 mg tablet, Take 1 tablet (20 mg total) by mouth daily (Patient taking differently: Take 20 mg by mouth daily bedtime), Disp: 90 tablet, Rfl: 3  •  hydrocortisone (ANUSOL-HC) 2.5 % rectal cream, Apply topically 2 (two) times a day, Disp: 28 g, Rfl: 0  •  Lidocaine, Anorectal, 5 % CREA, Apply topically 3 (three) times a day as needed (pain), Disp: 113 g, Rfl: 0  •  metoprolol succinate (TOPROL-XL) 50 mg 24 hr tablet, Take 1 tablet (50 mg total) by mouth daily, Disp: 90 tablet, Rfl: 1  •  multivitamin (THERAGRAN) TABS, Take 1 tablet by mouth daily, Disp: , Rfl:   •  omeprazole (PriLOSEC) 20 mg delayed release capsule, Take 1 capsule (20 mg total) by mouth daily, Disp: 90 capsule, Rfl: 3  •  cetirizine (ZyrTEC) 10 mg tablet, Take 1 tablet (10 mg total) by mouth daily (Patient taking differently: Take 10 mg by mouth as needed), Disp: 90 tablet, Rfl: 3  •  escitalopram (LEXAPRO) 10 mg tablet, TAKE ONE AND ONE-HALF TABLETS BY MOUTH EVERY DAY, Disp: 135 tablet, Rfl: 1  •  Lactobacillus Rhamnosus, GG, (CULTURELLE PO), Take by mouth, Disp: , Rfl:   •  methocarbamol (ROBAXIN) 500 mg tablet, Take 1 tablet (500 mg total) by mouth 3 (three) times a day as needed for muscle spasms, Disp: 20 tablet, Rfl: 0  •  saccharomyces boulardii (FLORASTOR) 250 mg capsule, Take 1 capsule (250 mg total) by mouth 2 (two) times a day, Disp: 60 capsule, Rfl: 1          Whom besides the patient is providing clinical information about today's encounter?   NO ADDITIONAL HISTORIAN (patient alone provided history)    Physical Exam and  "Assessment/Plan by Diagnosis:    Chief complaint: Patient is a 38 y/o female present for a routine skin exam without personal or family history of skin cancer and has a couple spots of concern on the face, arms, and back. Also feels a spot on the Left Buttock that started as a scab and now feels softer to touch. Patient has history of being a .      MELANOCYTIC NEVI (\"Moles\")    Physical Exam:  Anatomic Location Affected:   Mostly on sun-exposed areas of the trunk and extremities  Morphological Description:  Scattered, 1-4mm round to ovoid, symmetrical-appearing, even bordered, skin colored to dark brown macules/papules, mostly in sun-exposed areas  Pertinent Positives:  Pertinent Negatives:    Additional History of Present Condition:      Assessment and Plan:  Based on a thorough discussion of this condition and the management approach to it (including a comprehensive discussion of the known risks, side effects and potential benefits of treatment), the patient (family) agrees to implement the following specific plan:  When outside we recommend using a wide brim hat, sunglasses, long sleeve and pants, sunscreen with SPF 30+ with reapplication every 2 hours, or SPF specific clothing   Benign, reassured  Annual skin check     Melanocytic Nevi  Melanocytic nevi (\"moles\") are tan or brown, raised or flat areas of the skin which have an increased number of melanocytes. Melanocytes are the cells in our body which make pigment and account for skin color.    Some moles are present at birth (I.e., \"congenital nevi\"), while others come up later in life (i.e., \"acquired nevi\").  The sun can stimulate the body to make more moles.  Sunburns are not the only thing that triggers more moles.  Chronic sun exposure can do it too.     Clinically distinguishing a healthy mole from melanoma may be difficult, even for experienced dermatologists. The \"ABCDE's\" of moles have been suggested as a means of helping to alert a person " "to a suspicious mole and the possible increased risk of melanoma.  The suggestions for raising alert are as follows:    Asymmetry: Healthy moles tend to be symmetric, while melanomas are often asymmetric.  Asymmetry means if you draw a line through the mole, the two halves do not match in color, size, shape, or surface texture. Asymmetry can be a result of rapid enlargement of a mole, the development of a raised area on a previously flat lesion, scaling, ulceration, bleeding or scabbing within the mole.  Any mole that starts to demonstrate \"asymmetry\" should be examined promptly by a board certified dermatologist.     Border: Healthy moles tend to have discrete, even borders.  The border of a melanoma often blends into the normal skin and does not sharply delineate the mole from normal skin.  Any mole that starts to demonstrate \"uneven borders\" should be examined promptly by a board certified dermatologist.     Color: Healthy moles tend to be one color throughout.  Melanomas tend to be made up of different colors ranging from dark black, blue, white, or red.  Any mole that demonstrates a color change should be examined promptly by a board certified dermatologist.     Diameter: Healthy moles tend to be smaller than 0.6 cm in size; an exception are \"congenital nevi\" that can be larger.  Melanomas tend to grow and can often be greater than 0.6 cm (1/4 of an inch, or the size of a pencil eraser). This is only a guideline, and many normal moles may be larger than 0.6 cm without being unhealthy.  Any mole that starts to change in size (small to bigger or bigger to smaller) should be examined promptly by a board certified dermatologist.     Evolving: Healthy moles tend to \"stay the same.\"  Melanomas may often show signs of change or evolution such as a change in size, shape, color, or elevation.  Any mole that starts to itch, bleed, crust, burn, hurt, or ulcerate or demonstrate a change or evolution should be examined " promptly by a board certified dermatologist.        LENTIGO    Physical Exam:  Anatomic Location Affected:  sun exposed areas  Morphological Description:  Light brown macules  Pertinent Positives:  Pertinent Negatives:    Additional History of Present Condition:      Assessment and Plan:  Based on a thorough discussion of this condition and the management approach to it (including a comprehensive discussion of the known risks, side effects and potential benefits of treatment), the patient (family) agrees to implement the following specific plan:  When outside we recommend using a wide brim hat, sunglasses, long sleeve and pants, sunscreen with SPF 30+ with reapplication every 2 hours, or SPF specific clothing       What is a lentigo?  A lentigo is a pigmented flat or slightly raised lesion with a clearly defined edge. Unlike an ephelis (freckle), it does not fade in the winter months. There are several kinds of lentigo.  The name lentigo originally referred to its appearance resembling a small lentil. The plural of lentigo is lentigines, although “lentigos” is also in common use.    Who gets lentigines?  Lentigines can affect males and females of all ages and races. Solar lentigines are especially prevalent in fair skinned adults. Lentigines associated with syndromes are present at birth or arise during childhood.    What causes lentigines?  Common forms of lentigo are due to exposure to ultraviolet radiation:  Sun damage including sunburn   Indoor tanning   Phototherapy, especially photochemotherapy (PUVA)    Ionizing radiation, eg radiation therapy, can also cause lentigines.  Several familial syndromes associated with widespread lentigines originate from mutations in Terry-MAP kinase, mTOR signaling and PTEN pathways.    What is the treatment for lentigines?  Most lentigines are left alone. Attempts to lighten them may not be successful. The following approaches are used:  SPF 50+ broad-spectrum sunscreen  "  Hydroquinone bleaching cream   Alpha hydroxy acids   Vitamin C   Retinoids   Azelaic acid   Chemical peels  Individual lesions can be permanently removed using:  Cryotherapy   Intense pulsed light   Pigment lasers    How can lentigines be prevented?  Lentigines associated with exposure ultraviolet radiation can be prevented by very careful sun protection. Clothing is more successful at preventing new lentigines than are sunscreens.    What is the outlook for lentigines?  Lentigines usually persist. They may increase in number with age and sun exposure. Some in sun-protected sites may fade and disappear.    FONSECA ANGIOMAS    Physical Exam:  Anatomic Location Affected:  trunk  Morphological Description:  Scattered cherry red, 1-4 mm papules.  Pertinent Positives:  Pertinent Negatives:    Additional History of Present Condition:      Assessment and Plan:  Based on a thorough discussion of this condition and the management approach to it (including a comprehensive discussion of the known risks, side effects and potential benefits of treatment), the patient (family) agrees to implement the following specific plan:  Monitor for changes  Benign, reassured      Assessment and Plan:    Cherry angioma, also known as Lambert de Joseluis spots, are benign vascular skin lesions. A \"cherry angioma\" is a firm red, blue or purple papule, 0.1-1 cm in diameter. When thrombosed, they can appear black in colour until evaluated with a dermatoscope when the red or purple colour is more easily seen. Cherry angioma may develop on any part of the body but most often appear on the scalp, face, lips and trunk.  An angioma is due to proliferating endothelial cells; these are the cells that line the inside of a blood vessel.    Angiomas can arise in early life or later in life; the most common type of angioma is a cherry angioma.  Cherry angiomas are very common in males and females of any age or race. They are more noticeable in white skin " "than in skin of colour. They markedly increase in number from about the age of 40. There may be a family history of similar lesions. Eruptive cherry angiomas have been rarely reported to be associated with internal malignancy. The cause of angiomas is unknown. Genetic analysis of cherry angiomas has shown that they frequently carry specific somatic missense mutations in the GNAQ and GNA11 (Q209H) genes, which are involved in other vascular and melanocytic proliferations.    XEROSIS (\"DRY SKIN\")    Physical Exam:  Anatomic Location Affected:  diffuse  Morphological Description:  xerosis  Pertinent Positives:  Pertinent Negatives:    Additional History of Present Condition:      Assessment and Plan:  Based on a thorough discussion of this condition and the management approach to it (including a comprehensive discussion of the known risks, side effects and potential benefits of treatment), the patient (family) agrees to implement the following specific plan:  Use moisturizer like Eucerin,Cerave or Aveeno Cream 3 times a day for the dry skin            Dry skin refers to skin that feels dry to touch. Dry skin has a dull surface with a rough, scaly quality. The skin is less pliable and cracked. When dryness is severe, the skin may become inflamed and fissured.  Although any body site can be dry, dry skin tends to affect the shins more than any other site.    Dry skin is lacking moisture in the outer horny cell layer (stratum corneum) and this results in cracks in the skin surface.  Dry skin is also called xerosis, xeroderma or asteatosis (lack of fat).  It can affect males and females of all ages. There is some racial variability in water and lipid content of the skin.  Dry skin that starts in early childhood may be one of about 20 types of ichthyosis (fish-scale skin). There is often a family history of dry skin.   Dry skin is commonly seen in people with atopic dermatitis.  Nearly everyone > 60 years has dry " skin.    Dry skin that begins later may be seen in people with certain diseases and conditions.  Postmenopausal women  Hypothyroidism  Chronic renal disease   Malnutrition and weight loss   Subclinical dermatitis   Treatment with certain drugs such as oral retinoids, diuretics and epidermal growth factor receptor inhibitors      What is the treatment for dry skin?  The mainstay of treatment of dry skin and ichthyosis is moisturisers/emollients. They should be applied liberally and often enough to:  Reduce itch   Improve the barrier function   Prevent entry of irritants, bacteria   Reduce transepidermal water loss.      How can dry skin be prevented?  Eliminate aggravating factors:  Reduce the frequency of bathing.   A humidifier in winter and air conditioner in summer   Compare having a short shower with a prolonged soak in a bath.   Use lukewarm, not hot, water.   Replace standard soap with a substitute such as a synthetic detergent cleanser, water-miscible emollient, bath oil, anti-pruritic tar oil, colloidal oatmeal etc.   Apply an emollient liberally and often, particularly shortly after bathing, and when itchy. The drier the skin, the thicker this should be, especially on the hands.    What is the outlook for dry skin?  A tendency to dry skin may persist life-long, or it may improve once contributing factors are controlled.    SEBACEOUS HYPERPLASIA    Physical Exam:  Anatomic Location Affected:  Face  Morphological Description:  oil glands  Pertinent Positives:  Pertinent Negatives:    Additional History of Present Condition:  present on exam    Assessment and Plan:  Based on a thorough discussion of this condition and the management approach to it (including a comprehensive discussion of the known risks, side effects and potential benefits of treatment), the patient (family) agrees to implement the following specific plan:  Benign and reassured  Discussed referral to [facility] if pt would like cosmetic removal  done    Sebaceous Hyperplasia  Sebaceous hyperplasia is a common, benign condition of enlarged oil secreting (sebaceous) glands commonly found on the forehead and cheeks of middle-aged and elderly patients. They normally appear as small yellow bumps up to 3mm in diameter that can be single or multiple. The bumps on the face often display a centrall dell. Occasionally, these bumps can occur on the chest, areola, mouth, and genitals. Rarely, they can grow to take a giant form, or be arranged linearly.     Causes of sebaceous hyperplasia  Sebaceous hyperplasic is a form of benign hair follicle tumor and can often be confused with basal cell carcinoma. It can be more prevalent in immunosuppressed patients such as those undergoing organ transplantation. In the rare Roosevelt-Lindsay syndrome, sebaceous hyperplasia occurs in association with internal cancers.  Lesions of sebaceous hyperplasia are benign, with no known potential for malignant transformation, but they may be associated with nonmelanoma skin cancer in transplantation patients.     How we do diagnose sebaceous hyperplasia?  Your dermatologist may take a closer look at the bumps with a device called a dermatoscope. Common features include a central hair follicle surrounded by yellowish lobules with prominent blood vessels.     What is the treatment of sebaceous hyperplasia?   Since sebaceous hyperplasia is benign with no known potential for transformation into cancer, treatment is mostly for cosmetic reasons or if the lesions become irritated. Options include   Light electrocautery or laser vaporization  Oral isotrentinoin is effective for extensive or disfiguring spots, but do not prevent recurrence   Antiandrogens may be used in females to decrease the size and improve overall appearance of bumps     DERMATOFIBROMA    Physical Exam:  Anatomic Location Affected:  Left Buttock  Morphological Description:  6mm red dermal papule   Pertinent Negatives:    Additional  "History of Present Condition:  present on exam    Assessment and Plan:  Based on a thorough discussion of this condition and the management approach to it (including a comprehensive discussion of the known risks, side effects and potential benefits of treatment), the patient (family) agrees to implement the following specific plan:  Discussed risks  vs benefits of removal in the future  Benign and reassured    Assessment and Plan:  A dermatofibroma is a common benign fibrous nodule that most often arises on the skin of the lower legs.  A dermatofibroma is also called a \"cutaneous fibrous histiocytoma.\"  Dermatofibromas occur at all ages and in people of every ethnicity. They are more common in women than in men.    It is not clear if dermatofibroma is a reactive process or if it is a neoplasm. The lesions are made up of proliferating fibroblasts. Histiocytes may also be involved.  They are sometimes attributed to an insect bite or ingrownhair or local trauma, but not consistently. They may be more numerous in patients with altered immunity.    Dermatofibromas most often occur on the legs and arms, but may also arise on the trunk or any site of the body.  Typical clinical features include the following:  People may have 1 or up to 15 lesions.  Size varies from 0.5-1.5 cm diameter; most lesions are 7-10 mm diameter.  They are firm nodules tethered to the skin surface and mobile over subcutaneous tissue.  The skin \"dimples\" on pinching the lesion.  Color may be pink to light brown in white skin, and dark brown to black in dark skin; some appear paler in the center.  They do not usually cause symptoms, but they are sometimes painful or itchy.  Because they are often raised lesions, they may be traumatized, for example by a razor.  Occasionally dozens may erupt within a few months, usually in the setting of immunosuppression (for example autoimmune disease, cancer or certain medications).  Dermatofibroma does not give " rise to cancer. However, occasionally, it may be mistaken for dermatofibrosarcoma or desmoplastic melanoma.    A dermatofibroma is harmless and seldom causes any symptoms. Usually, only reassurance is needed. If it is nuisance or causing concern, the lesion can be removed surgically, resulting in a scar that is, by definition, usually longer in diameter than the widest portion of the dermatofibroma.  Cryotherapy, shave biopsy and laser surgery are rarely completely successful.  Skin punch biopsy or incisional biopsy may be undertaken if there is an atypical feature such as recent enlargement, ulceration, or asymmetrical structures and colours on dermatoscopy.    Scribe Attestation    I,:  Carol Ann Avila MA am acting as a scribe while in the presence of the attending physician.:       I,:  Cally Bishop MD personally performed the services described in this documentation    as scribed in my presence.:

## 2025-02-10 NOTE — PATIENT INSTRUCTIONS
"MELANOCYTIC NEVI (\"Moles\")    Melanocytic nevi (\"moles\") are tan or brown, raised or flat areas of the skin which have an increased number of melanocytes. Melanocytes are the cells in our body which make pigment and account for skin color.    Some moles are present at birth (I.e., \"congenital nevi\"), while others come up later in life (i.e., \"acquired nevi\").  The sun can stimulate the body to make more moles.  Sunburns are not the only thing that triggers more moles.  Chronic sun exposure can do it too.     Clinically distinguishing a healthy mole from melanoma may be difficult, even for experienced dermatologists. The \"ABCDE's\" of moles have been suggested as a means of helping to alert a person to a suspicious mole and the possible increased risk of melanoma.  The suggestions for raising alert are as follows:    Asymmetry: Healthy moles tend to be symmetric, while melanomas are often asymmetric.  Asymmetry means if you draw a line through the mole, the two halves do not match in color, size, shape, or surface texture. Asymmetry can be a result of rapid enlargement of a mole, the development of a raised area on a previously flat lesion, scaling, ulceration, bleeding or scabbing within the mole.  Any mole that starts to demonstrate \"asymmetry\" should be examined promptly by a board certified dermatologist.     Border: Healthy moles tend to have discrete, even borders.  The border of a melanoma often blends into the normal skin and does not sharply delineate the mole from normal skin.  Any mole that starts to demonstrate \"uneven borders\" should be examined promptly by a board certified dermatologist.     Color: Healthy moles tend to be one color throughout.  Melanomas tend to be made up of different colors ranging from dark black, blue, white, or red.  Any mole that demonstrates a color change should be examined promptly by a board certified dermatologist.     Diameter: Healthy moles tend to be smaller than 0.6 cm " "in size; an exception are \"congenital nevi\" that can be larger.  Melanomas tend to grow and can often be greater than 0.6 cm (1/4 of an inch, or the size of a pencil eraser). This is only a guideline, and many normal moles may be larger than 0.6 cm without being unhealthy.  Any mole that starts to change in size (small to bigger or bigger to smaller) should be examined promptly by a board certified dermatologist.     Evolving: Healthy moles tend to \"stay the same.\"  Melanomas may often show signs of change or evolution such as a change in size, shape, color, or elevation.  Any mole that starts to itch, bleed, crust, burn, hurt, or ulcerate or demonstrate a change or evolution should be examined promptly by a board certified dermatologist.      Dysplastic Nevi  Dysplastic moles are moles that fit the ABCDE rules of melanoma but are not identified as melanomas when examined under the microscope.  They may indicate an increased risk of melanoma in that person. If there is a family history of melanoma, most experts agree that the person may be at an increased risk for developing a melanoma.  Experts still do not agree on what dysplastic moles mean in patients without a personal or family history of melanoma.  Dysplastic moles are usually larger than common moles and have different colors within it with irregular borders. The appearance can be very similar to a melanoma. Biopsies of dysplastic moles may show abnormalities which are different from a regular mole.      Melanoma  Malignant melanoma is a type of skin cancer that can be deadly if it spreads throughout the body. The incidence of melanoma in the [place] is growing faster than any other cancer. Melanoma usually grows near the surface of the skin for a period of time, and then begins to grow deeper into the skin. Once it grows deeper into the skin, the risk of spread to other organs greatly increases. Therefore, early detection and removal of a malignant " "melanoma may result in a better chance at a complete cure; removal after the tumor has spread may not be as effective, leading to worse clinical outcomes such as death.    The true rate of nevus transformation into a melanoma is unknown. It has been estimated that the lifetime risk for any acquired melanocytic nevus on any 20-year-old individual transforming into melanoma by age 80 is 0.03% (1 in 3,164) for men and 0.009% (1 in 10,800) for women.     The appearance of a \"new mole\" remains one of the most reliable methods for identifying a malignant melanoma.  Occasionally, melanomas appear as rapidly growing, blue-black, dome-shaped bumps within a previous mole or previous area of normal skin.  Other times, melanomas are suspected when a mole suddenly appears or changes. Itching, burning, or pain in a pigmented lesion should increase suspicion, but most patients with early melanoma have no skin discomfort whatsoever.  Melanoma can occur anywhere on the skin, including areas that are difficult for self-examination. Many melanomas are first noticed by other family members.  Suspicious-looking moles may be removed for microscopic examination.       You may be able to prevent death from melanoma by doing two simple things:    Try to avoid unnecessary sun exposure and protect your skin when it is exposed to the sun.  People who live near the equator, people who have intermittent exposures to large amounts of sun, and people who have had sunburns in childhood or adolescence have an increased risk for melanoma. Sun sense and vigilant sun protection may be keys to helping to prevent melanoma.  We recommend wearing UPF-rated sun protective clothing and sunglasses whenever possible and applying a moisturizer-sunscreen combination product (SPF 50+) such as Neutrogena Daily Defense to sun exposed areas of skin at least three times a day.    Have your moles regularly examined by a board certified dermatologist AND by yourself or " "a family member/friend at home.  We recommend that you have your moles examined at least once a year by a board certified dermatologist.  Use your birthday as an annual reminder to have your \"Birthday Suit\" (I.e., your skin) examined; it is a nice birthday gift to yourself to know that your skin is healthy appearing!  Additionally, at-home self examinations may be helpful for detecting a possible melanoma.  Use the ABCDEs we discussed and check your moles once a month at home.      ANGIOMA (\"CHERRY ANGIOMA\")  Garcia angiomas markedly increase in number from about the age of 40, so it has been estimated that 75% of people over 75 years of age have them. Although they also called \"senile angiomas,\" they can occur in young people too - 5% of adolescents have been found to have them.     Cherry angiomas are very common in males and females of any age or race, with no difference in sexes or races affected. They are however more noticeable in white skin than in skin of colour.  There may be a family history of similar lesions. Eruptive (very large number appearing in a short period of time) cherry angiomas have been rarely reported to be associated with internal malignancy and pregnancy.    SEBACEOUS HYPERPLASIA    Physical Exam:  Anatomic Location Affected:  Face  Morphological Description:  oil glands  Pertinent Positives:  Pertinent Negatives:    Additional History of Present Condition:  present on exam    Assessment and Plan:  Based on a thorough discussion of this condition and the management approach to it (including a comprehensive discussion of the known risks, side effects and potential benefits of treatment), the patient (family) agrees to implement the following specific plan:  Benign and reassured  Discussed referral to [facility] if pt would like cosmetic removal done    Sebaceous Hyperplasia  Sebaceous hyperplasia is a common, benign condition of enlarged oil secreting (sebaceous) glands commonly found on the " forehead and cheeks of middle-aged and elderly patients. They normally appear as small yellow bumps up to 3mm in diameter that can be single or multiple. The bumps on the face often display a centrall dell. Occasionally, these bumps can occur on the chest, areola, mouth, and genitals. Rarely, they can grow to take a giant form, or be arranged linearly.     Causes of sebaceous hyperplasia  Sebaceous hyperplasic is a form of benign hair follicle tumor and can often be confused with basal cell carcinoma. It can be more prevalent in immunosuppressed patients such as those undergoing organ transplantation. In the rare Roosevelt-Lindsay syndrome, sebaceous hyperplasia occurs in association with internal cancers.  Lesions of sebaceous hyperplasia are benign, with no known potential for malignant transformation, but they may be associated with nonmelanoma skin cancer in transplantation patients.     How we do diagnose sebaceous hyperplasia?  Your dermatologist may take a closer look at the bumps with a device called a dermatoscope. Common features include a central hair follicle surrounded by yellowish lobules with prominent blood vessels.     What is the treatment of sebaceous hyperplasia?   Since sebaceous hyperplasia is benign with no known potential for transformation into cancer, treatment is mostly for cosmetic reasons or if the lesions become irritated. Options include   Light electrocautery or laser vaporization  Oral isotrentinoin is effective for extensive or disfiguring spots, but do not prevent recurrence   Antiandrogens may be used in females to decrease the size and improve overall appearance of bumps     DERMATOFIBROMA    Physical Exam:  Anatomic Location Affected:  Left Buttock  Morphological Description:  6mm dermal papule   Pertinent Negatives:    Additional History of Present Condition:  present on exam    Assessment and Plan:  Based on a thorough discussion of this condition and the management approach to it  "(including a comprehensive discussion of the known risks, side effects and potential benefits of treatment), the patient (family) agrees to implement the following specific plan:  Discussed risks  vs benefits of removal in the future  Benign and reassured    Assessment and Plan:  A dermatofibroma is a common benign fibrous nodule that most often arises on the skin of the lower legs.  A dermatofibroma is also called a \"cutaneous fibrous histiocytoma.\"  Dermatofibromas occur at all ages and in people of every ethnicity. They are more common in women than in men.    It is not clear if dermatofibroma is a reactive process or if it is a neoplasm. The lesions are made up of proliferating fibroblasts. Histiocytes may also be involved.  They are sometimes attributed to an insect bite or ingrownhair or local trauma, but not consistently. They may be more numerous in patients with altered immunity.    Dermatofibromas most often occur on the legs and arms, but may also arise on the trunk or any site of the body.  Typical clinical features include the following:  People may have 1 or up to 15 lesions.  Size varies from 0.5-1.5 cm diameter; most lesions are 7-10 mm diameter.  They are firm nodules tethered to the skin surface and mobile over subcutaneous tissue.  The skin \"dimples\" on pinching the lesion.  Color may be pink to light brown in white skin, and dark brown to black in dark skin; some appear paler in the center.  They do not usually cause symptoms, but they are sometimes painful or itchy.  Because they are often raised lesions, they may be traumatized, for example by a razor.  Occasionally dozens may erupt within a few months, usually in the setting of immunosuppression (for example autoimmune disease, cancer or certain medications).  Dermatofibroma does not give rise to cancer. However, occasionally, it may be mistaken for dermatofibrosarcoma or desmoplastic melanoma.    A dermatofibroma is harmless and seldom causes " any symptoms. Usually, only reassurance is needed. If it is nuisance or causing concern, the lesion can be removed surgically, resulting in a scar that is, by definition, usually longer in diameter than the widest portion of the dermatofibroma.  Cryotherapy, shave biopsy and laser surgery are rarely completely successful.  Skin punch biopsy or incisional biopsy may be undertaken if there is an atypical feature such as recent enlargement, ulceration, or asymmetrical structures and colours on dermatoscopy.

## 2025-02-14 ENCOUNTER — TELEPHONE (OUTPATIENT)
Age: 40
End: 2025-02-14

## 2025-02-14 NOTE — TELEPHONE ENCOUNTER
Rec'd call from patient stating that she will like to cancel the Procedure/Surgery with Dr John she s not ready to get the procedure done at this time.      Please reach when appointment is cancelled could leave a voice message

## 2025-02-26 ENCOUNTER — HOSPITAL ENCOUNTER (EMERGENCY)
Facility: HOSPITAL | Age: 40
Discharge: HOME | End: 2025-02-26
Attending: EMERGENCY MEDICINE
Payer: COMMERCIAL

## 2025-02-26 ENCOUNTER — TELEPHONE (OUTPATIENT)
Dept: SURGICAL ONCOLOGY | Facility: CLINIC | Age: 40
End: 2025-02-26

## 2025-02-26 VITALS
DIASTOLIC BLOOD PRESSURE: 96 MMHG | RESPIRATION RATE: 18 BRPM | HEART RATE: 79 BPM | SYSTOLIC BLOOD PRESSURE: 132 MMHG | BODY MASS INDEX: 26.18 KG/M2 | WEIGHT: 172.18 LBS | TEMPERATURE: 98.4 F | OXYGEN SATURATION: 98 %

## 2025-02-26 DIAGNOSIS — R20.2 PARESTHESIAS: Primary | ICD-10-CM

## 2025-02-26 DIAGNOSIS — M62.830 SPASM OF RIGHT TRAPEZIUS MUSCLE: ICD-10-CM

## 2025-02-26 PROCEDURE — 99283 EMERGENCY DEPT VISIT LOW MDM: CPT

## 2025-02-26 PROCEDURE — 99284 EMERGENCY DEPT VISIT MOD MDM: CPT | Performed by: EMERGENCY MEDICINE

## 2025-02-26 RX ORDER — NAPROXEN 250 MG/1
250 TABLET ORAL
Qty: 21 TABLET | Refills: 0 | Status: SHIPPED | OUTPATIENT
Start: 2025-02-26 | End: 2025-03-05

## 2025-02-26 RX ORDER — LIDOCAINE 50 MG/G
1 PATCH TOPICAL ONCE
Status: DISCONTINUED | OUTPATIENT
Start: 2025-02-26 | End: 2025-02-26 | Stop reason: HOSPADM

## 2025-02-26 RX ORDER — LIDOCAINE 40 MG/G
CREAM TOPICAL AS NEEDED
Qty: 30 G | Refills: 0 | Status: SHIPPED | OUTPATIENT
Start: 2025-02-26

## 2025-02-26 RX ORDER — METHOCARBAMOL 750 MG/1
750 TABLET, FILM COATED ORAL ONCE
Status: COMPLETED | OUTPATIENT
Start: 2025-02-26 | End: 2025-02-26

## 2025-02-26 RX ORDER — METHOCARBAMOL 500 MG/1
500 TABLET, FILM COATED ORAL 2 TIMES DAILY
Qty: 20 TABLET | Refills: 0 | Status: SHIPPED | OUTPATIENT
Start: 2025-02-26

## 2025-02-26 RX ADMIN — LIDOCAINE 1 PATCH: 50 PATCH CUTANEOUS at 12:00

## 2025-02-26 RX ADMIN — METHOCARBAMOL 750 MG: 750 TABLET ORAL at 12:00

## 2025-02-26 NOTE — ED PROVIDER NOTES
Time reflects when diagnosis was documented in both MDM as applicable and the Disposition within this note       Time User Action Codes Description Comment    2/26/2025 11:49 AM Rafael Grant Add [R20.2] Paresthesias     2/26/2025 11:50 AM Rafael Grant Add [M62.830] Spasm of right trapezius muscle           ED Disposition       ED Disposition   Discharge    Condition   Stable    Date/Time   Wed Feb 26, 2025 11:50 AM    Comment   Candi Marshall discharge to home/self care.                   Assessment & Plan       Medical Decision Making  Right hand numbness likely secondary to carpal tunnel syndrome possibly secondary to radiculopathy.  No history exam findings to suggest fracture, dislocation, and imaging is not indicated.  No evidence of gout or infectious etiology and workup is not indicated.    Trapezius muscle spasm-will reassure, , treat for spasm, outpatient follow-up.    Risk  OTC drugs.  Prescription drug management.             Medications   lidocaine (LIDODERM) 5 % patch 1 patch (1 patch Topical Medication Applied 2/26/25 1200)   methocarbamol (ROBAXIN) tablet 750 mg (750 mg Oral Given 2/26/25 1200)       ED Risk Strat Scores                                                History of Present Illness       Chief Complaint   Patient presents with    Numbness     Patient reporting intermittent right hand numbness for the past month. States has gone for massages and taken NSAIDS with no relief. States not sure if she has a pinched nerve and that hand is mostly numb but with certain positions her entire right arm will go numb as well.        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    GERD (gastroesophageal reflux disease)     PONV (postoperative nausea and vomiting)     avoids narcotics if able      Past Surgical History:   Procedure  Laterality Date    BREAST BIOPSY Right 2023    BREAST LUMPECTOMY Right 2023    Procedure: LUMPECTOMY BREAST TONYA LOCALIZED;  Surgeon: Tova Childs MD;  Location: [facility];  Service: Surgical Oncology    EGD      US BREAST CLIP NEEDLE LOC RIGHT Right 2023    US BREAST NEEDLE LOC LEFT Left 2012    US GUIDED BREAST BIOPSY RIGHT COMPLETE Right 2023    WISDOM TOOTH EXTRACTION        Family History   Problem Relation Age of Onset    Irregular heart beat Mother         Proxysmal Atrial Tachycardia, PVC    Asthma Father     Hyperlipidemia Father     Hypertension Father     Anxiety disorder Father     Liver disease Father     No Known Problems Sister     Uterine cancer Maternal Grandmother 68    Prostate cancer Maternal Grandfather 67    No Known Problems Paternal Grandmother     Emphysema Paternal Grandfather     Ovarian cancer Maternal Aunt 67    Breast cancer Maternal Aunt 69        stage 4 metastatic breast cancer    Colon cancer Neg Hx       Social History     Tobacco Use    Smoking status: Former     Current packs/day: 0.00     Types: Cigarettes     Quit date:      Years since quittin.1     Passive exposure: Past    Smokeless tobacco: Never   Vaping Use    Vaping status: Never Used   Substance Use Topics    Alcohol use: Yes     Alcohol/week: 7.0 standard drinks of alcohol     Types: 7 Glasses of wine per week     Comment: social    Drug use: No      E-Cigarette/Vaping    E-Cigarette Use Never User       E-Cigarette/Vaping Substances    Nicotine No     THC No     CBD No     Flavoring No     Other No     Unknown No       I have reviewed and agree with the history as documented.     1-year-old male presents for evaluation of 1 month of intermittent right hand numbness, right-sided neck pain into the shoulder.  She states been getting massages for this.  She states for the past month she has been getting tingling sensation in her right thumb, index, middle finger.  She states has  been coming and going but when she woke up this morning it appeared to involve her entire hand.  In addition to this she complains of a throbbing pain in her right neck down between her shoulder blade towards the shoulder.  The pain is improved with massage.  Patient denies neck pain, neck stiffness, recent falls or trauma, weakness, other joint pains or swelling, chest pain, shortness of breath.      History provided by:  Patient      Review of Systems   Constitutional:  Negative for activity change, appetite change, fatigue and fever.   HENT:  Negative for congestion, dental problem, ear pain, rhinorrhea and sore throat.    Eyes:  Negative for pain and redness.   Respiratory:  Negative for chest tightness, shortness of breath and wheezing.    Cardiovascular:  Negative for chest pain and palpitations.   Gastrointestinal:  Negative for abdominal pain, blood in stool, constipation, diarrhea, nausea and vomiting.   Endocrine: Negative for cold intolerance and heat intolerance.   Genitourinary:  Negative for dysuria, frequency and hematuria.   Musculoskeletal:  Negative for arthralgias and myalgias.   Skin:  Negative for color change, pallor and rash.   Neurological:  Negative for weakness and numbness.   Hematological:  Does not bruise/bleed easily.   Psychiatric/Behavioral:  Negative for agitation, hallucinations and suicidal ideas.            Objective       ED Triage Vitals [02/26/25 1106]   Temperature Pulse Blood Pressure Respirations SpO2 Patient Position - Orthostatic VS   98.4 °F (36.9 °C) 79 132/96 18 98 % --      Temp Source Heart Rate Source BP Location FiO2 (%) Pain Score    Oral -- -- -- --      Vitals      Date and Time Temp Pulse SpO2 Resp BP Pain Score FACES Pain Rating User   02/26/25 1106 98.4 °F (36.9 °C) 79 98 % 18 132/96 -- -- SL            Physical Exam  Constitutional:       Appearance: She is well-developed.   HENT:      Head: Normocephalic and atraumatic.   Eyes:      Pupils: Pupils are equal,  round, and reactive to light.   Neck:      Vascular: No JVD.      Trachea: No tracheal deviation.      Comments: Nontender to palpation over cervical and thoracic spines.  No pain with palpation of the cervical nerve roots.  No pain reproduction of symptoms with axial loading of the spine.  Patient with tenderness palpation and hypertonicity of the right trapezius.  Cardiovascular:      Rate and Rhythm: Normal rate and regular rhythm.   Pulmonary:      Effort: No tachypnea, accessory muscle usage or respiratory distress.   Abdominal:      General: There is no distension.   Musculoskeletal:      Right lower leg: Normal.      Left lower leg: Normal.      Comments: Right shoulder, elbow exams within normal limits.  Right wrist with full range of motion without pain.  Negative Tinel and Phalen signs.  Patient's right hand with decree sensation noted over the digits.  Patient has normal capillary refill, radial pulse, symmetric strength.   Skin:     General: Skin is warm.      Capillary Refill: Capillary refill takes less than 2 seconds.   Neurological:      Mental Status: She is alert and oriented to person, place, and time.   Psychiatric:         Behavior: Behavior normal.         Results Reviewed       None            No orders to display       Procedures    ED Medication and Procedure Management   Prior to Admission Medications   Prescriptions Last Dose Informant Patient Reported? Taking?   Lactobacillus Rhamnosus, GG, (CULTURELLE PO)  Self Yes No   Sig: Take by mouth   Lidocaine, Anorectal, 5 % CREA  Self No No   Sig: Apply topically 3 (three) times a day as needed (pain)   albuterol (PROVENTIL HFA,VENTOLIN HFA) 90 mcg/act inhaler  Self No No   Sig: Inhale 2 puffs every 4 (four) hours as needed for wheezing or shortness of breath   azelastine (ASTELIN) 0.1 % nasal spray  Self Yes No   Si sprays into each nostril as needed   cetirizine (ZyrTEC) 10 mg tablet  Self No No   Sig: Take 1 tablet (10 mg total) by mouth  daily   Patient taking differently: Take 10 mg by mouth as needed   escitalopram (LEXAPRO) 10 mg tablet   No No   Sig: TAKE ONE AND ONE-HALF TABLETS BY MOUTH EVERY DAY   escitalopram (LEXAPRO) 20 mg tablet  Self No Yes   Sig: Take 1 tablet (20 mg total) by mouth daily   famotidine (PEPCID) 20 mg tablet  Self No Yes   Sig: Take 1 tablet (20 mg total) by mouth daily   Patient taking differently: Take 20 mg by mouth daily bedtime   hydrocortisone (ANUSOL-HC) 2.5 % rectal cream  Self No No   Sig: Apply topically 2 (two) times a day   methocarbamol (ROBAXIN) 500 mg tablet  Self No No   Sig: Take 1 tablet (500 mg total) by mouth 3 (three) times a day as needed for muscle spasms   metoprolol succinate (TOPROL-XL) 50 mg 24 hr tablet  Self No Yes   Sig: Take 1 tablet (50 mg total) by mouth daily   multivitamin (THERAGRAN) TABS  Self Yes No   Sig: Take 1 tablet by mouth daily   omeprazole (PriLOSEC) 20 mg delayed release capsule  Self No No   Sig: Take 1 capsule (20 mg total) by mouth daily   saccharomyces boulardii (FLORASTOR) 250 mg capsule   No No   Sig: Take 1 capsule (250 mg total) by mouth 2 (two) times a day      Facility-Administered Medications: None     Discharge Medication List as of 2/26/2025 11:54 AM        START taking these medications    Details   lidocaine (LMX) 4 % cream Apply topically as needed for moderate pain, Starting Wed 2/26/2025, Normal      !! methocarbamol (ROBAXIN) 500 mg tablet Take 1 tablet (500 mg total) by mouth 2 (two) times a day, Starting Wed 2/26/2025, Normal      naproxen (NAPROSYN) 250 mg tablet Take 1 tablet (250 mg total) by mouth 3 (three) times a day with meals for 7 days, Starting Wed 2/26/2025, Until Wed 3/5/2025, Normal       !! - Potential duplicate medications found. Please discuss with provider.        CONTINUE these medications which have NOT CHANGED    Details   !! escitalopram (LEXAPRO) 20 mg tablet Take 1 tablet (20 mg total) by mouth daily, Starting Thu 10/24/2024, Normal       famotidine (PEPCID) 20 mg tablet Take 1 tablet (20 mg total) by mouth daily, Starting Fri 10/28/2022, Normal      metoprolol succinate (TOPROL-XL) 50 mg 24 hr tablet Take 1 tablet (50 mg total) by mouth daily, Starting Thu 10/24/2024, Normal      albuterol (PROVENTIL HFA,VENTOLIN HFA) 90 mcg/act inhaler Inhale 2 puffs every 4 (four) hours as needed for wheezing or shortness of breath, Starting Mon 10/23/2023, Normal      azelastine (ASTELIN) 0.1 % nasal spray 2 sprays into each nostril as needed, Starting Tue 6/27/2017, Historical Med      cetirizine (ZyrTEC) 10 mg tablet Take 1 tablet (10 mg total) by mouth daily, Starting Tue 3/13/2018, Until Thu 10/24/2024, No Print      !! escitalopram (LEXAPRO) 10 mg tablet TAKE ONE AND ONE-HALF TABLETS BY MOUTH EVERY DAY, Starting Tue 2/4/2025, Normal      hydrocortisone (ANUSOL-HC) 2.5 % rectal cream Apply topically 2 (two) times a day, Starting Thu 9/21/2023, Normal      Lactobacillus Rhamnosus, GG, (CULTURELLE PO) Take by mouth, Historical Med      Lidocaine, Anorectal, 5 % CREA Apply topically 3 (three) times a day as needed (pain), Starting Thu 9/21/2023, Normal      !! methocarbamol (ROBAXIN) 500 mg tablet Take 1 tablet (500 mg total) by mouth 3 (three) times a day as needed for muscle spasms, Starting Tue 10/8/2024, Normal      multivitamin (THERAGRAN) TABS Take 1 tablet by mouth daily, Historical Med      omeprazole (PriLOSEC) 20 mg delayed release capsule Take 1 capsule (20 mg total) by mouth daily, Starting Fri 10/28/2022, Normal      saccharomyces boulardii (FLORASTOR) 250 mg capsule Take 1 capsule (250 mg total) by mouth 2 (two) times a day, Starting Thu 11/14/2024, Normal       !! - Potential duplicate medications found. Please discuss with provider.        Outpatient Discharge Orders   Cock Up Wrist Splint     ED SEPSIS DOCUMENTATION   Time reflects when diagnosis was documented in both MDM as applicable and the Disposition within this note       Time User  Action Codes Description Comment    2/26/2025 11:49 AM Rafael Grant [R20.2] Paresthesias     2/26/2025 11:50 AM Rafael Grant [M62.830] Spasm of right trapezius muscle                  Rafael Grant MD  02/26/25 1224

## 2025-02-26 NOTE — Clinical Note
Candi Marshall was seen and treated in our emergency department on 2/26/2025.    No restrictions            Diagnosis:     Candi  may return to work on return date.    She may return on this date: 02/27/2025         If you have any questions or concerns, please don't hesitate to call.      Rafael Grant MD    ______________________________           _______________          _______________  Hospital Representative                              Date                                Time

## 2025-04-21 ENCOUNTER — TELEMEDICINE (OUTPATIENT)
Dept: BEHAVIORAL/MENTAL HEALTH CLINIC | Facility: CLINIC | Age: 40
End: 2025-04-21
Payer: COMMERCIAL

## 2025-04-21 DIAGNOSIS — F32.9 MAJOR DEPRESSIVE DISORDER WITH CURRENT ACTIVE EPISODE, UNSPECIFIED DEPRESSION EPISODE SEVERITY, UNSPECIFIED WHETHER RECURRENT: ICD-10-CM

## 2025-04-21 DIAGNOSIS — F41.1 GENERALIZED ANXIETY DISORDER: Primary | ICD-10-CM

## 2025-04-21 PROCEDURE — 90837 PSYTX W PT 60 MINUTES: CPT | Performed by: SOCIAL WORKER

## 2025-04-21 NOTE — BH TREATMENT PLAN
Outpatient Behavioral Health Psychotherapy Treatment Plan    Candi Lambertjama  1985     Date of Initial Psychotherapy Assessment: 4/2/21   Date of Current Treatment Plan: 04/21/25  Treatment Plan Target Date: 10/21/25  Treatment Plan Expiration Date: 10/21/25    Diagnosis:   1. Generalized anxiety disorder        2. Major depressive disorder with current active episode, unspecified depression episode severity, unspecified whether recurrent            Area(s) of Need:   Grief/loss  Stress     Long Term Goal 1 (in the client's own words): Be able to cope with the losses     Stage of Change: Action     Target Date for completion: TBD             Anticipated therapeutic modalities: CBT             People identified to complete this goal: Keiry                    Objective 1: (identify the means of measuring success in meeting the objective):               Be able to talk about it         Do not minimize what I am feeling                    Objective 2: (identify the means of measuring success in meeting the objective):               Use my support system         Set boundaries with mom      Long Term Goal 2 (in the client's own words): Be able to manage my stress        Stage of Change: Action     Target Date for completion: TBD             Anticipated therapeutic modalities: CBT             People identified to complete this goal: Keiry                    Objective 1: (identify the means of measuring success in meeting the objective):           Set boundaries with others          Be able to identify my triggers                    Objective 2: (identify the means of measuring success in meeting the objective):           Use healthy coping mechanisms for stress           I am currently under the care of a [facility] psychiatric provider: no     My [facility] psychiatric provider is: N/A     I am currently taking psychiatric medications: N/A     I feel that I will be ready for discharge from mental health care  when I reach the following (measurable goal/objective): Goals completed 90%     For children and adults who have a legal guardian:          Has there been any change to custody orders and/or guardianship status? NA. If yes, attach updated documentation.     I have created my Crisis Plan and have been offered a copy of this plan     Behavioral Health Treatment Plan [facility]: Diagnosis and Treatment Plan explained to Candi Marshall acknowledges an understanding of their diagnosis. Candi Marshall agrees to this treatment plan.     I have been offered a copy of this Treatment Plan. yes
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
## 2025-05-02 NOTE — PROGRESS NOTES
"Name: Candi Marshall      : 1985      MRN: [id number]  Encounter Provider: KAREN Roberts  Encounter Date: 2025   Encounter department: [facility]  :  Assessment & Plan  Dysmenorrhea  Reviewed recommendation for pelvic ultrasound  Briefly reviewed options for menstrual control including NSAIDs, LARCs. D/t family history patient is considering hysterectomy.   Orders:    US pelvis complete w transvaginal; Future      Will call/ [employer] message with results  RTO to discuss options with physician     History of Present Illness   HPI  Candi Marshall is a 40 y.o. female who presents to discuss menses. Menarche at age 12 LMP 25.   Menses are typically every 2 months. For the past 4 months menses are every 27-29 days. bleeding is described as heavy for 3 days necessitating tampon changes 3-4 times per day.  Associated symptoms include cramping, back pain, nausea and diarrhea.  Alleviating factors include Tylenol or Motrin.  Denies aggravating factors.  Has tried OTC Motrin/Tylenol.  Is sexually active using withdrawal for contraception. Denies new medications, supplements or vitamins. Family history ovarian cancer maternal Aunt, uterine cancer maternal grandmother.     Last pap smear 24 NILM/ HR HPV(-)   Last mammogram 6/3/24 Birads 3; scheduled 25  Gardasil vaccine x 1  dose   History obtained from: patient    Review of Systems   Constitutional:  Negative for chills and fever.   Respiratory: Negative.     Cardiovascular: Negative.    Genitourinary:  Positive for menstrual problem, pelvic pain and vaginal bleeding.     Medical History Reviewed by provider this encounter:  Allergies  Meds  Problems     .     Objective   /82   Ht 5' 8\" (1.727 m)   Wt 78.9 kg (174 lb)   LMP 2025   BMI 26.46 kg/m²      Physical Exam  Constitutional:       Appearance: Normal appearance.   Neurological:      Mental Status: She is alert and oriented " to person, place, and time.   Psychiatric:         Mood and Affect: Mood normal.         Behavior: Behavior normal.

## 2025-05-05 ENCOUNTER — OFFICE VISIT (OUTPATIENT)
Dept: OBGYN CLINIC | Facility: CLINIC | Age: 40
End: 2025-05-05
Payer: COMMERCIAL

## 2025-05-05 VITALS
SYSTOLIC BLOOD PRESSURE: 112 MMHG | BODY MASS INDEX: 26.37 KG/M2 | WEIGHT: 174 LBS | DIASTOLIC BLOOD PRESSURE: 82 MMHG | HEIGHT: 68 IN

## 2025-05-05 DIAGNOSIS — N94.6 DYSMENORRHEA: Primary | ICD-10-CM

## 2025-05-05 PROCEDURE — 99213 OFFICE O/P EST LOW 20 MIN: CPT | Performed by: NURSE PRACTITIONER

## 2025-05-14 ENCOUNTER — APPOINTMENT (EMERGENCY)
Dept: CT IMAGING | Facility: HOSPITAL | Age: 40
End: 2025-05-14
Payer: COMMERCIAL

## 2025-05-14 ENCOUNTER — HOSPITAL ENCOUNTER (EMERGENCY)
Facility: HOSPITAL | Age: 40
Discharge: HOME | End: 2025-05-14
Attending: EMERGENCY MEDICINE
Payer: COMMERCIAL

## 2025-05-14 VITALS
RESPIRATION RATE: 18 BRPM | TEMPERATURE: 97.6 F | SYSTOLIC BLOOD PRESSURE: 138 MMHG | DIASTOLIC BLOOD PRESSURE: 78 MMHG | HEART RATE: 72 BPM | OXYGEN SATURATION: 98 %

## 2025-05-14 DIAGNOSIS — E83.42 HYPOMAGNESEMIA: ICD-10-CM

## 2025-05-14 DIAGNOSIS — R11.2 NAUSEA AND VOMITING: ICD-10-CM

## 2025-05-14 DIAGNOSIS — R42 LIGHTHEADEDNESS: ICD-10-CM

## 2025-05-14 DIAGNOSIS — E83.39 HYPOPHOSPHATEMIA: ICD-10-CM

## 2025-05-14 DIAGNOSIS — R10.9 ABDOMINAL PAIN: Primary | ICD-10-CM

## 2025-05-14 LAB
ALBUMIN SERPL BCG-MCNC: 4.4 G/DL (ref 3.5–5)
ALP SERPL-CCNC: 50 U/L (ref 34–104)
ALT SERPL W P-5'-P-CCNC: 20 U/L (ref 7–52)
ANION GAP SERPL CALCULATED.3IONS-SCNC: 14 MMOL/L (ref 4–13)
AST SERPL W P-5'-P-CCNC: 34 U/L (ref 13–39)
B-HCG SERPL-ACNC: <0.6 MIU/ML (ref 0–5)
BASOPHILS # BLD AUTO: 0.03 THOUSANDS/ÂΜL (ref 0–0.1)
BASOPHILS NFR BLD AUTO: 1 % (ref 0–1)
BILIRUB SERPL-MCNC: 0.55 MG/DL (ref 0.2–1)
BILIRUB UR QL STRIP: NEGATIVE
BUN SERPL-MCNC: 17 MG/DL (ref 5–25)
CALCIUM SERPL-MCNC: 8.6 MG/DL (ref 8.4–10.2)
CHLORIDE SERPL-SCNC: 99 MMOL/L (ref 96–108)
CLARITY UR: CLEAR
CO2 SERPL-SCNC: 22 MMOL/L (ref 21–32)
COLOR UR: YELLOW
CREAT SERPL-MCNC: 0.66 MG/DL (ref 0.6–1.3)
EOSINOPHIL # BLD AUTO: 0.1 THOUSAND/ÂΜL (ref 0–0.61)
EOSINOPHIL NFR BLD AUTO: 2 % (ref 0–6)
ERYTHROCYTE [DISTWIDTH] IN BLOOD BY AUTOMATED COUNT: 12.1 % (ref 11.6–15.1)
GFR SERPL CREATININE-BSD FRML MDRD: 109 ML/MIN/1.73SQ M
GLUCOSE SERPL-MCNC: 91 MG/DL (ref 65–140)
GLUCOSE UR STRIP-MCNC: NEGATIVE MG/DL
HCT VFR BLD AUTO: 39.9 % (ref 34.8–46.1)
HGB BLD-MCNC: 13.2 G/DL (ref 11.5–15.4)
HGB UR QL STRIP.AUTO: NEGATIVE
IMM GRANULOCYTES # BLD AUTO: 0.01 THOUSAND/UL (ref 0–0.2)
IMM GRANULOCYTES NFR BLD AUTO: 0 % (ref 0–2)
KETONES UR STRIP-MCNC: NEGATIVE MG/DL
LEUKOCYTE ESTERASE UR QL STRIP: NEGATIVE
LIPASE SERPL-CCNC: 17 U/L (ref 11–82)
LYMPHOCYTES # BLD AUTO: 1.5 THOUSANDS/ÂΜL (ref 0.6–4.47)
LYMPHOCYTES NFR BLD AUTO: 25 % (ref 14–44)
MAGNESIUM SERPL-MCNC: 1.6 MG/DL (ref 1.9–2.7)
MCH RBC QN AUTO: 31.5 PG (ref 26.8–34.3)
MCHC RBC AUTO-ENTMCNC: 33.1 G/DL (ref 31.4–37.4)
MCV RBC AUTO: 95 FL (ref 82–98)
MONOCYTES # BLD AUTO: 0.4 THOUSAND/ÂΜL (ref 0.17–1.22)
MONOCYTES NFR BLD AUTO: 7 % (ref 4–12)
NEUTROPHILS # BLD AUTO: 3.92 THOUSANDS/ÂΜL (ref 1.85–7.62)
NEUTS SEG NFR BLD AUTO: 65 % (ref 43–75)
NITRITE UR QL STRIP: NEGATIVE
NRBC BLD AUTO-RTO: 0 /100 WBCS
PH UR STRIP.AUTO: 7.5 [PH]
PHOSPHATE SERPL-MCNC: 2.5 MG/DL (ref 2.7–4.5)
PLATELET # BLD AUTO: 188 THOUSANDS/UL (ref 149–390)
PMV BLD AUTO: 9.5 FL (ref 8.9–12.7)
POTASSIUM SERPL-SCNC: 4 MMOL/L (ref 3.5–5.3)
PROT SERPL-MCNC: 7.2 G/DL (ref 6.4–8.4)
PROT UR STRIP-MCNC: NEGATIVE MG/DL
RBC # BLD AUTO: 4.19 MILLION/UL (ref 3.81–5.12)
SODIUM SERPL-SCNC: 135 MMOL/L (ref 135–147)
SP GR UR STRIP.AUTO: 1.01
TSH SERPL DL<=0.05 MIU/L-ACNC: 1.3 UIU/ML (ref 0.45–4.5)
UROBILINOGEN UR QL STRIP.AUTO: 0.2 E.U./DL
WBC # BLD AUTO: 5.96 THOUSAND/UL (ref 4.31–10.16)

## 2025-05-14 PROCEDURE — 96375 TX/PRO/DX INJ NEW DRUG ADDON: CPT

## 2025-05-14 PROCEDURE — 84443 ASSAY THYROID STIM HORMONE: CPT

## 2025-05-14 PROCEDURE — 74177 CT ABD & PELVIS W/CONTRAST: CPT

## 2025-05-14 PROCEDURE — 96365 THER/PROPH/DIAG IV INF INIT: CPT

## 2025-05-14 PROCEDURE — 99284 EMERGENCY DEPT VISIT MOD MDM: CPT

## 2025-05-14 PROCEDURE — 80053 COMPREHEN METABOLIC PANEL: CPT

## 2025-05-14 PROCEDURE — 36415 COLL VENOUS BLD VENIPUNCTURE: CPT

## 2025-05-14 PROCEDURE — 85025 COMPLETE CBC W/AUTO DIFF WBC: CPT

## 2025-05-14 PROCEDURE — 83690 ASSAY OF LIPASE: CPT

## 2025-05-14 PROCEDURE — 81003 URINALYSIS AUTO W/O SCOPE: CPT

## 2025-05-14 PROCEDURE — 96376 TX/PRO/DX INJ SAME DRUG ADON: CPT

## 2025-05-14 PROCEDURE — 84100 ASSAY OF PHOSPHORUS: CPT

## 2025-05-14 PROCEDURE — 83735 ASSAY OF MAGNESIUM: CPT

## 2025-05-14 PROCEDURE — 99285 EMERGENCY DEPT VISIT HI MDM: CPT

## 2025-05-14 PROCEDURE — 84702 CHORIONIC GONADOTROPIN TEST: CPT

## 2025-05-14 RX ORDER — PANTOPRAZOLE SODIUM 40 MG/10ML
40 INJECTION, POWDER, LYOPHILIZED, FOR SOLUTION INTRAVENOUS ONCE
Status: COMPLETED | OUTPATIENT
Start: 2025-05-14 | End: 2025-05-14

## 2025-05-14 RX ORDER — KETOROLAC TROMETHAMINE 30 MG/ML
15 INJECTION, SOLUTION INTRAMUSCULAR; INTRAVENOUS ONCE
Status: COMPLETED | OUTPATIENT
Start: 2025-05-14 | End: 2025-05-14

## 2025-05-14 RX ORDER — ONDANSETRON 2 MG/ML
4 INJECTION INTRAMUSCULAR; INTRAVENOUS ONCE
Status: COMPLETED | OUTPATIENT
Start: 2025-05-14 | End: 2025-05-14

## 2025-05-14 RX ORDER — DIAZEPAM 10 MG/2ML
2.5 INJECTION, SOLUTION INTRAMUSCULAR; INTRAVENOUS ONCE
Status: COMPLETED | OUTPATIENT
Start: 2025-05-14 | End: 2025-05-14

## 2025-05-14 RX ORDER — MAGNESIUM SULFATE HEPTAHYDRATE 40 MG/ML
2 INJECTION, SOLUTION INTRAVENOUS ONCE
Status: COMPLETED | OUTPATIENT
Start: 2025-05-14 | End: 2025-05-14

## 2025-05-14 RX ORDER — SODIUM CHLORIDE, SODIUM GLUCONATE, SODIUM ACETATE, POTASSIUM CHLORIDE, MAGNESIUM CHLORIDE, SODIUM PHOSPHATE, DIBASIC, AND POTASSIUM PHOSPHATE .53; .5; .37; .037; .03; .012; .00082 G/100ML; G/100ML; G/100ML; G/100ML; G/100ML; G/100ML; G/100ML
1000 INJECTION, SOLUTION INTRAVENOUS ONCE
Status: COMPLETED | OUTPATIENT
Start: 2025-05-14 | End: 2025-05-14

## 2025-05-14 RX ORDER — ONDANSETRON 4 MG/1
4 TABLET, FILM COATED ORAL EVERY 6 HOURS
Qty: 20 TABLET | Refills: 0 | Status: SHIPPED | OUTPATIENT
Start: 2025-05-14

## 2025-05-14 RX ADMIN — PANTOPRAZOLE SODIUM 40 MG: 40 INJECTION, POWDER, FOR SOLUTION INTRAVENOUS at 13:59

## 2025-05-14 RX ADMIN — DIAZEPAM 2.5 MG: 10 INJECTION, SOLUTION INTRAMUSCULAR; INTRAVENOUS at 14:19

## 2025-05-14 RX ADMIN — SODIUM CHLORIDE, SODIUM GLUCONATE, SODIUM ACETATE, POTASSIUM CHLORIDE, MAGNESIUM CHLORIDE, SODIUM PHOSPHATE, DIBASIC, AND POTASSIUM PHOSPHATE 1000 ML: .53; .5; .37; .037; .03; .012; .00082 INJECTION, SOLUTION INTRAVENOUS at 14:21

## 2025-05-14 RX ADMIN — IOHEXOL 100 ML: 350 INJECTION, SOLUTION INTRAVENOUS at 15:27

## 2025-05-14 RX ADMIN — KETOROLAC TROMETHAMINE 15 MG: 30 INJECTION, SOLUTION INTRAMUSCULAR at 14:02

## 2025-05-14 RX ADMIN — MAGNESIUM SULFATE HEPTAHYDRATE 2 G: 40 INJECTION, SOLUTION INTRAVENOUS at 15:00

## 2025-05-14 RX ADMIN — Medication 1 TABLET: at 15:32

## 2025-05-14 RX ADMIN — ONDANSETRON 4 MG: 2 INJECTION INTRAMUSCULAR; INTRAVENOUS at 15:29

## 2025-05-14 RX ADMIN — ONDANSETRON 4 MG: 2 INJECTION INTRAMUSCULAR; INTRAVENOUS at 13:56

## 2025-05-14 NOTE — Clinical Note
Candi Marshall was seen and treated in our emergency department on 5/14/2025.    No restrictions        Abdominal pain, N/V.    Diagnosis:     Candi  may return to work on return date.    She may return on this date: 05/16/2025         If you have any questions or concerns, please don't hesitate to call.      Tim Villarreal PA-C    ______________________________           _______________          _______________  Hospital Representative                              Date                                Time

## 2025-05-14 NOTE — ED PROVIDER NOTES
Time reflects when diagnosis was documented in both MDM as applicable and the Disposition within this note       Time User Action Codes Description Comment    5/14/2025  4:38 PM Tim Villarreal Add [R10.9] Abdominal pain     5/14/2025  4:39 PM Tim Villarreal Add [R11.2] Nausea and vomiting     5/14/2025  4:40 PM Tim Villarreal Add [R42] Lightheadedness     5/14/2025  4:40 PM Tim Villarreal Add [E83.42] Hypomagnesemia     5/14/2025  4:40 PM Tim Villarreal Add [E83.39] Hypophosphatemia           ED Disposition       ED Disposition   Discharge    Condition   Stable    Date/Time   Wed May 14, 2025  4:32 PM    Comment   Candi Marshall discharge to home/self care.                   Assessment & Plan       Medical Decision Making  Patient is a well-appearing 40-year-old female presenting with nausea, vomiting, and epigastric pain x 1 day. She also complains of ongoing lightheadedness and nausea for the past month. Mild epigastric tenderness on exam, no rebound or guarding.  Plan is to obtain abdominal labs including CBC, CMP, lipase and CT abdomen/pelvis w IV contrast to evaluate for any acute abdominal pathology. UA to evaluate for UTI/hematuria and hcg to rule out pregnancy/marker. Will check electrolytes including magnesium and phosphorus. EKG to evaluate for arrhythmia. Will check TSH level. She has a TVUS ordered for Saturday for her dysmenorrhea.  See ED course for interpretation of labs, imaging, and further medical decision making.   IV fluids for hydration. Zofran for nausea. Toradol and Protonix for epigastric pain. Valium for anxiety. Labs relatively reassuring other than mild electrolyte abnormalities and a small AG. Repleted her electrolytes. CT reveals no acute abdominal pathology. She was feeling significantly better with the meds and tolerating p.o. Recommended that she proceeds with the pelvic ultrasound on Saturday and continues with adequate fluid and eases into a lighter diet as  tolerated.  Dispo: Patient is safe/stable for discharge home and was discharged home with strict return precautions. Provided verbal and written supportive care instructions for managing her illness. Advised patient to return to the nearest emergency room if she has new or worsening symptoms or if any questions arise. Advised patient to follow-up with her family doctor and her GYN. Patient is satisfied with care and agrees with management and plan.     Amount and/or Complexity of Data Reviewed  External Data Reviewed: labs, radiology and notes.  Labs: ordered. Decision-making details documented in ED Course.  Radiology: ordered. Decision-making details documented in ED Course.  ECG/medicine tests: ordered and independent interpretation performed.    Risk  Prescription drug management.        ED Course as of 05/14/25 1704   Wed May 14, 2025   1341 Blood Pressure: 136/90  Vital signs reviewed and within normal limits.   1432 WBC: 5.96  No leukocytosis.   1432 Hemoglobin: 13.2  No anemia.   1440 CMP(!)  Electrolytes WNL. No RAS or glucose abnormality. No transaminitis.   1440 ANION GAP(!): 14   1441 LIPASE: 17  Negative.   1441 MAGNESIUM(!): 1.6  Will replete with IV magnesium.   1441 Phosphorus(!): 2.5  Slightly hemolyzed. Will give K-Phos.   1515 TSH 3RD GENERATON: 1.300  Within normal limits.   1515 HCG QUANTITATIVE: <0.6  Negative marker.   1527 Reassessed patient and went over labs after she returned from CT. Abdominal pain is better but she persists with nausea. Will redose Zofran.    1558 UA w Reflex to Microscopic w Reflex to Culture  Negative for UTI.   1632 Went over CT results and reassessed her. She is feeling significantly better and comfortable with discharge home. Tolerating p.o.       Medications   ondansetron (ZOFRAN) injection 4 mg (4 mg Intravenous Given 5/14/25 1356)   multi-electrolyte (Plasmalyte-A/Isolyte-S PH 7.4/Normosol-R) IV bolus 1,000 mL (0 mL Intravenous Stopped 5/14/25 1521)   ketorolac  (TORADOL) injection 15 mg (15 mg Intravenous Given 5/14/25 1402)   pantoprazole (PROTONIX) injection 40 mg (40 mg Intravenous Given 5/14/25 1359)   diazepam (VALIUM) injection 2.5 mg (2.5 mg Intravenous Given 5/14/25 1419)   magnesium sulfate 2 g/50 mL IVPB (premix) 2 g (0 g Intravenous Stopped 5/14/25 1600)   potassium-sodium phosphateS (K-PHOS,PHOSPHA 250) -250 mg tablet 1 tablet (1 tablet Oral Given 5/14/25 1532)   iohexol (OMNIPAQUE) 350 MG/ML injection (MULTI-DOSE) 100 mL (100 mL Intravenous Given 5/14/25 1527)   ondansetron (ZOFRAN) injection 4 mg (4 mg Intravenous Given 5/14/25 1529)       ED Risk Strat Scores                    No data recorded        SBIRT 22yo+      Flowsheet Row Most Recent Value   Initial Alcohol Screen: US AUDIT-C     1. How often do you have a drink containing alcohol? 5 Filed at: 05/14/2025 9521   2. How many drinks containing alcohol do you have on a typical day you are drinking?  2 Filed at: 05/14/2025 4115   3a. Male UNDER 65: How often do you have five or more drinks on one occasion? 0 Filed at: 05/14/2025 9337   3b. FEMALE Any Age, or MALE 65+: How often do you have 4 or more drinks on one occassion? 2 Filed at: 05/14/2025 1335   Audit-C Score 9 Filed at: 05/14/2025 1336   KELLY: How many times in the past year have you...    Used an illegal drug or used a prescription medication for non-medical reasons? Never Filed at: 05/14/2025 8863                            History of Present Illness       Chief Complaint   Patient presents with    Abdominal Pain     Pt reports feeling lightheaded and nauseous. Patient had one ep of vomiting on triage. Patient reports epigastric pain.        Past Medical History:   Diagnosis Date    Abnormal Pap smear of cervix 2001    Cryosurgery    Alcohol abuse 3/2008    coping mechanism    Allergic     Anxiety     Asthma     hx-infrequent as adult    Back pain     history of sports injuries    BRCA1 negative 2017    BRCA2 negative 2017    Bulimia  nervosa 9/2001    treated    Depression     Eating disorder     bulimea- 20's in remission    Fibroid 2012    GERD (gastroesophageal reflux disease)     Panic attack 7/2019    PONV (postoperative nausea and vomiting)     avoids narcotics if able    Sleep difficulties 3/2020      Past Surgical History:   Procedure Laterality Date    BREAST BIOPSY Right 06/06/2023    BREAST LUMPECTOMY Right 08/04/2023    Procedure: LUMPECTOMY BREAST TONYA LOCALIZED;  Surgeon: Tova Childs MD;  Location: [facility];  Service: Surgical Oncology    DENTAL SURGERY  11/2000    wisdom teeth    EGD      GYNECOLOGIC CRYOSURGERY      US BREAST CLIP NEEDLE LOC RIGHT Right 07/14/2023    US BREAST NEEDLE LOC LEFT Left 05/09/2012    US GUIDED BREAST BIOPSY RIGHT COMPLETE Right 06/06/2023    WISDOM TOOTH EXTRACTION        Family History   Problem Relation Age of Onset    Irregular heart beat Mother         Proxysmal Atrial Tachycardia, PVC    Asthma Father     Hyperlipidemia Father     Hypertension Father     Anxiety disorder Father     Liver disease Father     Diabetes Father     Depression Father     Alcohol abuse Father     No Known Problems Sister     Uterine cancer Maternal Grandmother 68    Prostate cancer Maternal Grandfather 67    No Known Problems Paternal Grandmother     Emphysema Paternal Grandfather     Ovarian cancer Maternal Aunt 67    Breast cancer Maternal Aunt 69        stage 4 metastatic breast cancer    Colon cancer Neg Hx       Social History[1]   E-Cigarette/Vaping    E-Cigarette Use Never User       E-Cigarette/Vaping Substances    Nicotine No     THC No     CBD No     Flavoring No     Other No     Unknown No       I have reviewed and agree with the history as documented.     Patient is a 40-year-old female with relevant past medical history of anxiety, asthma, back pain, bulimia, depression, fibroids, GERD, panic attack, and sleep difficulties presenting with nausea, vomiting, and epigastric pain x 1 day. She also complains  of ongoing lightheadedness and nausea for the past month. She has been dealing with lightheadedness and nausea for the past month which acutely worsened today. She started with pain in her epigastric region today that she describes as dull and achy. Pain does not radiate. 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 She did not eat or drink anything today secondary to the nausea. She vomited x 1 during triage. She also reports ongoing menstrual cramps and bleeding which has an ultrasound ordered for on Saturday. No bleeding today. Denies chance of pregnancy. She denies F/C, headache, visual changes, chest pain, dyspnea, change in BMs, or urinary symptoms.      History provided by:  Patient   used: No    Abdominal Pain  Associated symptoms: nausea and vomiting    Associated symptoms: no chest pain, no chills, no constipation, no cough, no diarrhea, no dysuria, no fever, no hematuria, no shortness of breath and no sore throat        Review of Systems   Constitutional:  Negative for chills and fever.   HENT:  Negative for congestion, ear pain, rhinorrhea and sore throat.    Eyes:  Negative for pain and visual disturbance.   Respiratory:  Negative for cough and shortness of breath.    Cardiovascular:  Negative for chest pain and palpitations.   Gastrointestinal:  Positive for abdominal pain, nausea and vomiting. Negative for constipation and diarrhea.   Genitourinary:  Positive for menstrual problem. Negative for dysuria, frequency, hematuria and urgency.   Musculoskeletal:  Negative for arthralgias and back pain.   Skin:  Negative for color change and rash.   Neurological:  Positive for light-headedness. Negative for dizziness, seizures, syncope and headaches.   Psychiatric/Behavioral:  Negative for agitation and confusion. The patient is nervous/anxious.            Objective        ED Triage Vitals   Temperature Pulse Blood Pressure Respirations SpO2 Patient Position - Orthostatic VS   05/14/25 1338 05/14/25 1338 05/14/25 1338 05/14/25 1338 05/14/25 1338 05/14/25 1645   97.6 °F (36.4 °C) 69 136/90 16 99 % Sitting      Temp Source Heart Rate Source BP Location FiO2 (%) Pain Score    05/14/25 1338 05/14/25 1338 05/14/25 1645 -- 05/14/25 1402    Temporal Monitor Right arm  6      Vitals      Date and Time Temp Pulse SpO2 Resp BP Pain Score FACES Pain Rating User   05/14/25 1645 -- -- 98 % -- -- -- -- AM   05/14/25 1645 -- 72 -- 18 138/78 -- -- KF   05/14/25 1445 -- 62 100 % 16 145/89 -- -- AM   05/14/25 1430 -- 62 99 % 16 146/87 -- -- AM   05/14/25 1402 -- -- -- -- -- 6 -- AM   05/14/25 1338 97.6 °F (36.4 °C) 69 99 % 16 136/90 -- -- AM            Physical Exam  Vitals and nursing note reviewed.   Constitutional:       General: She is not in acute distress.     Appearance: She is well-developed. She is not ill-appearing.   HENT:      Head: Normocephalic and atraumatic.      Mouth/Throat:      Mouth: Mucous membranes are moist.     Eyes:      Conjunctiva/sclera: Conjunctivae normal.       Cardiovascular:      Rate and Rhythm: Normal rate and regular rhythm.      Heart sounds: No murmur heard.  Pulmonary:      Effort: Pulmonary effort is normal. No respiratory distress.      Breath sounds: Normal breath sounds. No wheezing, rhonchi or rales.   Abdominal:      Palpations: Abdomen is soft.      Tenderness: There is abdominal tenderness in the epigastric area. There is no guarding or rebound.     Musculoskeletal:         General: No swelling.      Cervical back: Neck supple.     Skin:     General: Skin is warm and dry.      Capillary Refill: Capillary refill takes less than 2 seconds.     Neurological:      General: No focal deficit present.      Mental Status: She is alert and oriented to person, place, and time.      GCS: GCS eye subscore is 4. GCS verbal subscore is 5. GCS motor subscore is 6.       Cranial Nerves: Cranial nerves 2-12 are intact.      Sensory: Sensation is intact.      Motor: Motor function is intact.      Coordination: Coordination is intact.     Psychiatric:         Mood and Affect: Mood normal.         Results Reviewed       Procedure Component Value Units Date/Time    UA w Reflex to Microscopic w Reflex to Culture [[id number]]  (Normal) Collected: 05/14/25 1503    Lab Status: Final result Specimen: Urine, Clean Catch Updated: 05/14/25 1531     Color, UA Yellow     Clarity, UA Clear     Specific Gravity, UA 1.015     pH, UA 7.5     Leukocytes, UA Negative     Nitrite, UA Negative     Protein, UA Negative mg/dl      Glucose, UA Negative mg/dl      Ketones, UA Negative mg/dl      Urobilinogen, UA 0.2 E.U./dl      Bilirubin, UA Negative     Occult Blood, UA Negative    TSH, 3rd generation with Free T4 reflex [[id number]]  (Normal) Collected: 05/14/25 1404    Lab Status: Final result Specimen: Blood from Arm, Left Updated: 05/14/25 1502     TSH 3RD GENERATON 1.300 uIU/mL     Pregnancy, hCG, quantitative [[id number]]  (Normal) Collected: 05/14/25 1424    Lab Status: Final result Specimen: Blood from Arm, Right Updated: 05/14/25 1453     HCG, Quant <0.6 mIU/mL     Narrative:       Expected Ranges:    HCG results between 5.0 and 25.0 mIU/mL may be indicative of early pregnancy but should be interpreted in light of the total clinical presentation.    HCG can rise to detectable levels in jami and post menopausal women (0-11.6 mIU/mL).     Approximate               Approximate HCG  Gestation age          Concentration ( mIU/mL)  _____________          ______________________   Weeks                      HCG values  0.2-1                       5-50  1-2                           2-3                         100-5000  3-4                         500-38206  4-5                         1000-80423  5-6                         [id number]  6-8                         [id number]  8-12                         35380-075855      CMP [[id number]]  (Abnormal) Collected: 05/14/25 1404    Lab Status: Final result Specimen: Blood from Arm, Left Updated: 05/14/25 1434     Sodium 135 mmol/L      Potassium 4.0 mmol/L      Chloride 99 mmol/L      CO2 22 mmol/L      ANION GAP 14 mmol/L      BUN 17 mg/dL      Creatinine 0.66 mg/dL      Glucose 91 mg/dL      Calcium 8.6 mg/dL      AST 34 U/L      ALT 20 U/L      Alkaline Phosphatase 50 U/L      Total Protein 7.2 g/dL      Albumin 4.4 g/dL      Total Bilirubin 0.55 mg/dL      eGFR 109 ml/min/1.73sq m     Narrative:      National Kid[employer] guidelines for Chronic Kidney Disease (CKD):     Stage 1 with normal or high GFR (GFR > 90 mL/min/1.73 square meters)    Stage 2 Mild CKD (GFR = 60-89 mL/min/1.73 square meters)    Stage 3A Moderate CKD (GFR = 45-59 mL/min/1.73 square meters)    Stage 3B Moderate CKD (GFR = 30-44 mL/min/1.73 square meters)    Stage 4 Severe CKD (GFR = 15-29 mL/min/1.73 square meters)    Stage 5 End Stage CKD (GFR <15 mL/min/1.73 square meters)  Note: GFR calculation is accurate only with a steady state creatinine    Lipase [[id number]]  (Normal) Collected: 05/14/25 1404    Lab Status: Final result Specimen: Blood from Arm, Left Updated: 05/14/25 1434     Lipase 17 u/L     Magnesium [[id number]]  (Abnormal) Collected: 05/14/25 1404    Lab Status: Final result Specimen: Blood from Arm, Left Updated: 05/14/25 1434     Magnesium 1.6 mg/dL     Phosphorus [[id number]]  (Abnormal) Collected: 05/14/25 1404    Lab Status: Final result Specimen: Blood from Arm, Left Updated: 05/14/25 1434     Phosphorus 2.5 mg/dL     CBC and differential [[id number]] Collected: 05/14/25 1424    Lab Status: Final result Specimen: Blood from Arm, Right Updated: 05/14/25 1428     WBC 5.96 Thousand/uL      RBC 4.19 Million/uL      Hemoglobin 13.2 g/dL      Hematocrit 39.9 %      MCV 95 fL      MCH 31.5 pg      MCHC 33.1 g/dL      RDW 12.1 %      MPV 9.5 fL      Platelets 188  Thousands/uL      nRBC 0 /100 WBCs      Segmented % 65 %      Immature Grans % 0 %      Lymphocytes % 25 %      Monocytes % 7 %      Eosinophils Relative 2 %      Basophils Relative 1 %      Absolute Neutrophils 3.92 Thousands/µL      Absolute Immature Grans 0.01 Thousand/uL      Absolute Lymphocytes 1.50 Thousands/µL      Absolute Monocytes 0.40 Thousand/µL      Eosinophils Absolute 0.10 Thousand/µL      Basophils Absolute 0.03 Thousands/µL             CT Abdomen pelvis with contrast   Final Interpretation by See Reece MD ( 161)      No acute abdominopelvic findings or significant change from priors.         Resident: Weston Dumont I, the attending radiologist, have reviewed the images and agree with the final report above.      Workstation performed: [id number]             ECG 12 Lead Documentation Only    Date/Time: 2025 12:31 PM    Performed by: Tim Villarreal PA-C  Authorized by: Tim Villarreal PA-C    Indications / Diagnosis:  Lightheadedness.  ECG reviewed by me, the ED Provider: yes    Patient location:  ED  Previous ECG:     Comparison to cardiac monitor: Yes    Interpretation:     Interpretation: abnormal    Rate:     ECG rate:  78    ECG rate assessment: normal    Rhythm:     Rhythm: sinus rhythm    Ectopy:     Ectopy: none    QRS:     QRS axis:  Normal    QRS intervals:  Normal  Conduction:     Conduction: normal    ST segments:     ST segments:  Normal  T waves:     T waves: non-specific        ED Medication and Procedure Management   Prior to Admission Medications   Prescriptions Last Dose Informant Patient Reported? Taking?   Lidocaine, Anorectal, 5 % CREA  Self No No   Sig: Apply topically 3 (three) times a day as needed (pain)   albuterol (PROVENTIL HFA,VENTOLIN HFA) 90 mcg/act inhaler  Self No No   Sig: Inhale 2 puffs every 4 (four) hours as needed for wheezing or shortness of breath   azelastine (ASTELIN) 0.1 % nasal spray  Self Yes No   Si sprays into each  nostril as needed   cetirizine (ZyrTEC) 10 mg tablet  Self No No   Sig: Take 1 tablet (10 mg total) by mouth daily   Patient taking differently: Take 10 mg by mouth as needed   escitalopram (LEXAPRO) 20 mg tablet  Self No No   Sig: Take 1 tablet (20 mg total) by mouth daily   famotidine (PEPCID) 20 mg tablet  Self No No   Sig: Take 1 tablet (20 mg total) by mouth daily   hydrocortisone (ANUSOL-HC) 2.5 % rectal cream  Self No No   Sig: Apply topically 2 (two) times a day   methocarbamol (ROBAXIN) 500 mg tablet   No No   Sig: Take 1 tablet (500 mg total) by mouth 2 (two) times a day   metoprolol succinate (TOPROL-XL) 50 mg 24 hr tablet  Self No No   Sig: Take 1 tablet (50 mg total) by mouth daily   multivitamin (THERAGRAN) TABS  Self Yes No   Sig: Take 1 tablet by mouth daily   naproxen (NAPROSYN) 250 mg tablet   No No   Sig: Take 1 tablet (250 mg total) by mouth 3 (three) times a day with meals for 7 days   omeprazole (PriLOSEC) 20 mg delayed release capsule  Self No No   Sig: Take 1 capsule (20 mg total) by mouth daily      Facility-Administered Medications: None     Discharge Medication List as of 5/14/2025  4:46 PM        START taking these medications    Details   ondansetron (ZOFRAN) 4 mg tablet Take 1 tablet (4 mg total) by mouth every 6 (six) hours, Starting Wed 5/14/2025, Normal           CONTINUE these medications which have NOT CHANGED    Details   albuterol (PROVENTIL HFA,VENTOLIN HFA) 90 mcg/act inhaler Inhale 2 puffs every 4 (four) hours as needed for wheezing or shortness of breath, Starting Mon 10/23/2023, Normal      azelastine (ASTELIN) 0.1 % nasal spray 2 sprays into each nostril as needed, Starting Tue 6/27/2017, Historical Med      cetirizine (ZyrTEC) 10 mg tablet Take 1 tablet (10 mg total) by mouth daily, Starting Tue 3/13/2018, Until Thu 10/24/2024, No Print      escitalopram (LEXAPRO) 20 mg tablet Take 1 tablet (20 mg total) by mouth daily, Starting Thu 10/24/2024, Normal      famotidine  (PEPCID) 20 mg tablet Take 1 tablet (20 mg total) by mouth daily, Starting Fri 10/28/2022, Normal      hydrocortisone (ANUSOL-HC) 2.5 % rectal cream Apply topically 2 (two) times a day, Starting 2023, Normal      Lidocaine, Anorectal, 5 % CREA Apply topically 3 (three) times a day as needed (pain), Starting 2023, Normal      methocarbamol (ROBAXIN) 500 mg tablet Take 1 tablet (500 mg total) by mouth 2 (two) times a day, Starting 2025, Normal      metoprolol succinate (TOPROL-XL) 50 mg 24 hr tablet Take 1 tablet (50 mg total) by mouth daily, Starting Thu 10/24/2024, Normal      multivitamin (THERAGRAN) TABS Take 1 tablet by mouth daily, Historical Med      naproxen (NAPROSYN) 250 mg tablet Take 1 tablet (250 mg total) by mouth 3 (three) times a day with meals for 7 days, Starting 2025, Until Wed 3/5/2025, Normal      omeprazole (PriLOSEC) 20 mg delayed release capsule Take 1 capsule (20 mg total) by mouth daily, Starting Fri 10/28/2022, Normal           No discharge procedures on file.  ED SEPSIS DOCUMENTATION   Time reflects when diagnosis was documented in both MDM as applicable and the Disposition within this note       Time User Action Codes Description Comment    2025  4:38 PM Tim Villarreal [R10.9] Abdominal pain     2025  4:39 PM Tim Villarreal [R11.2] Nausea and vomiting     2025  4:40 PM Tim Villarreal [R42] Lightheadedness     2025  4:40 PM Tim Villarreal [E83.42] Hypomagnesemia     2025  4:40 PM Tim Villarreal [E83.39] Hypophosphatemia                      [1]   Social History  Tobacco Use    Smoking status: Former     Current packs/day: 0.00     Types: Cigarettes     Quit date:      Years since quittin.3     Passive exposure: Past    Smokeless tobacco: Never   Vaping Use    Vaping status: Never Used   Substance Use Topics    Alcohol use: Yes     Alcohol/week: 7.0 standard drinks of alcohol     Types: 7  Glasses of wine per week     Comment: social    Drug use: No        Tim Villarreal PA-C  05/14/25 9710

## 2025-05-14 NOTE — DISCHARGE INSTRUCTIONS
Immediately return to the emergency room if you experience any new or worsening symptoms or if the symptoms are lasting longer than expected.     Please proceed with the pelvic ultrasound as scheduled. Continue with adequate fluids and ease into a lighter diet as tolerated.

## 2025-05-17 ENCOUNTER — HOSPITAL ENCOUNTER (OUTPATIENT)
Dept: ULTRASOUND IMAGING | Facility: HOSPITAL | Age: 40
Discharge: HOME | End: 2025-05-17
Attending: NURSE PRACTITIONER
Payer: COMMERCIAL

## 2025-05-17 DIAGNOSIS — N94.6 DYSMENORRHEA: ICD-10-CM

## 2025-05-17 PROCEDURE — 76856 US EXAM PELVIC COMPLETE: CPT

## 2025-05-17 PROCEDURE — 76830 TRANSVAGINAL US NON-OB: CPT

## 2025-05-29 ENCOUNTER — RESULTS FOLLOW-UP (OUTPATIENT)
Dept: OBGYN CLINIC | Facility: MEDICAL CENTER | Age: 40
End: 2025-05-29

## 2025-05-29 DIAGNOSIS — N83.202 LEFT OVARIAN CYST: Primary | ICD-10-CM

## 2025-06-02 ENCOUNTER — OFFICE VISIT (OUTPATIENT)
Dept: URGENT CARE | Facility: MEDICAL CENTER | Age: 40
End: 2025-06-02
Payer: COMMERCIAL

## 2025-06-02 ENCOUNTER — APPOINTMENT (OUTPATIENT)
Dept: RADIOLOGY | Facility: MEDICAL CENTER | Age: 40
End: 2025-06-02
Attending: PHYSICIAN ASSISTANT
Payer: COMMERCIAL

## 2025-06-02 VITALS
RESPIRATION RATE: 18 BRPM | DIASTOLIC BLOOD PRESSURE: 80 MMHG | SYSTOLIC BLOOD PRESSURE: 129 MMHG | OXYGEN SATURATION: 95 % | TEMPERATURE: 98.7 F | HEART RATE: 91 BPM

## 2025-06-02 DIAGNOSIS — S61.259A DOG BITE OF FINGER, INITIAL ENCOUNTER: ICD-10-CM
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
## 2025-06-02 DIAGNOSIS — S61.259A DOG BITE OF FINGER, INITIAL ENCOUNTER: Primary | ICD-10-CM

## 2025-06-02 DIAGNOSIS — S62.665B OPEN NONDISPLACED FRACTURE OF DISTAL PHALANX OF LEFT RING FINGER, INITIAL ENCOUNTER: ICD-10-CM

## 2025-06-02 PROCEDURE — 73140 X-RAY EXAM OF FINGER(S): CPT

## 2025-06-02 PROCEDURE — S9083 URGENT CARE CENTER GLOBAL: HCPCS | Performed by: PHYSICIAN ASSISTANT

## 2025-06-02 PROCEDURE — G0384 LEV 5 HOSP TYPE B ED VISIT: HCPCS | Performed by: PHYSICIAN ASSISTANT

## 2025-06-02 NOTE — PROGRESS NOTES
[facility]  Name: Candi Marshall      : 1985      MRN: [id number]  Encounter Provider: Mono Carson PA-C  Encounter Date: 2025   Encounter department: [facility] [place]  :  Assessment & Plan  Dog bite of finger, initial encounter    Orders:    amoxicillin-clavulanate (AUGMENTIN) 875-125 mg per tablet; Take 1 tablet by mouth every 12 (twelve) hours for 7 days    Open nondisplaced fracture of distal phalanx of left ring finger, initial encounter    Orders:    Ambulatory referral to Orthopedic Surgery; Future    Laceration repair        Patient Instructions    Take antibiotic as directed until completed  Motrin and or Tylenol as needed for pain  Follow-up with orthopedics  Wound dressing and wound care as directed  Follow up with PCP in 3-5 days.  Proceed to  ER if symptoms worsen.    If tests are performed, our office will contact you with results only if changes need to made to the care plan discussed with you at the visit. You can review your full results on [facility].    Chief Complaint:   Chief Complaint   Patient presents with    Dog Bite     Patient was trying to break up fight between her 2 dogs when 1 of them bit her left ring finger      History of Present Illness   40-year-old female presents with dog bite to her left ring finger and injury to the forearm and abdomen from the dog.  Patient reports she has 2 dogs at home and they got into a fight over some food.  Patient reports the 1 dog that bit her is about a year old and a rescue that she has not had very long.  Reports that this is not the first time that this dog has nipped her since she has gotten it.  Patient states that there fight over the food and she was breaking them up when the other dog bit her on her left index finger and also made a scratch on her left forearm and on her abdomen.  No other injuries reported.  Up-to-date on tetanus.  Denies any numbness or tingling in the  finger.    Injury  The incident occurred 1 to 3 hours ago. The incident occurred at home. The injury mechanism was a cut/puncture wound. Context: Dog bite. No protective equipment was used. There is an injury to the Abdomen. There is an injury to the Left forearm. There is an injury to the Left ring finger. The pain is moderate. It is unlikely that a foreign body is present. Pertinent negatives include no abdominal pain, coughing, numbness, tingling, vomiting or weakness. There have been no prior injuries to these areas. Her tetanus status is UTD.         Review of Systems   Constitutional: Negative.    HENT: Negative.     Eyes: Negative.    Respiratory: Negative.  Negative for cough.    Cardiovascular: Negative.    Gastrointestinal: Negative.  Negative for abdominal pain and vomiting.   Musculoskeletal: Negative.    Skin:  Positive for color change and wound.   Neurological: Negative.  Negative for tingling, weakness and numbness.     Past Medical History   Past Medical History[1]  Past Surgical History[2]  Family History[3]  she reports that she quit smoking about 12 years ago. Her smoking use included cigarettes. She has been exposed to tobacco smoke. She has never used smokeless tobacco. She reports current alcohol use of about 7.0 standard drinks of alcohol per week. She reports that she does not use drugs.  Current Outpatient Medications   Medication Instructions    albuterol (PROVENTIL HFA,VENTOLIN HFA) 90 mcg/act inhaler 2 puffs, Inhalation, Every 4 hours PRN    amoxicillin-clavulanate (AUGMENTIN) 875-125 mg per tablet 1 tablet, Oral, Every 12 hours scheduled    azelastine (ASTELIN) 0.1 % nasal spray 2 sprays, As needed    cetirizine (ZYRTEC) 10 mg, Oral, Daily    escitalopram (LEXAPRO) 20 mg, Oral, Daily    famotidine (PEPCID) 20 mg, Oral, Daily    hydrocortisone (ANUSOL-HC) 2.5 % rectal cream Topical, 2 times daily    Lidocaine, Anorectal, 5 % CREA Apply externally, 3 times daily PRN    methocarbamol  (ROBAXIN) 500 mg, Oral, 2 times daily    metoprolol succinate (TOPROL-XL) 50 mg, Oral, Daily    multivitamin (THERAGRAN) TABS 1 tablet, Daily    naproxen (NAPROSYN) 250 mg, Oral, 3 times daily with meals    omeprazole (PRILOSEC) 20 mg, Oral, Daily    ondansetron (ZOFRAN) 4 mg, Oral, Every 6 hours   Allergies[4]     Objective   /80   Pulse 91   Temp 98.7 °F (37.1 °C)   Resp 18   LMP 04/27/2025 (Exact Date)   SpO2 95%      Physical Exam  Vitals and nursing note reviewed.   Constitutional:       General: She is not in acute distress.     Appearance: She is well-developed.   HENT:      Head: Normocephalic and atraumatic.      Right Ear: External ear normal.      Left Ear: External ear normal.      Nose: Nose normal.      Mouth/Throat:      Mouth: Mucous membranes are moist.     Eyes:      General:         Right eye: No discharge.         Left eye: No discharge.      Conjunctiva/sclera: Conjunctivae normal.     Pulmonary:      Effort: Pulmonary effort is normal. No respiratory distress.     Musculoskeletal:         General: Normal range of motion.      Left hand: Laceration (1/2 cm laceration to volar aspect of left ring finger on DIP), tenderness and bony tenderness present. No swelling or deformity. Normal range of motion. Normal strength. Normal sensation. There is no disruption of two-point discrimination. Normal capillary refill. Normal pulse.      Cervical back: Normal range of motion and neck supple.     Skin:     General: Skin is warm and dry.      Findings: Ecchymosis (Noted to left forearm and abdomen) and wound (Superficial abrasion noted to left forearm which looks more active scratch and also on abdomen.) present.     Neurological:      Mental Status: She is alert and oriented to person, place, and time.       Universal Protocol:  procedure performed by consultantConsent: Written consent obtained  Risks and benefits: risks, benefits and alternatives were discussed  Consent given by:  "patient  Patient understanding: patient states understanding of the procedure being performed  Patient consent: the patient's understanding of the procedure matches consent given  Patient identity confirmed: verbally with patient  Laceration repair    Date/Time: 6/2/2025 7:20 PM    Performed by: Mono Carson PA-C  Authorized by: Mono Carson PA-C  Body area: upper extremity  Location details: left ring finger  Laceration length: 15 cm  Foreign bodies: no foreign bodies  Tendon involvement: none  Nerve involvement: none  Vascular damage: no    Sedation:  Patient sedated: no        Procedure Details:  Preparation: Patient was prepped and draped in the usual sterile fashion.  Irrigation solution: saline  Irrigation method: jet lavage  Amount of cleaning: extensive  Skin closure: Steri-Strips  Approximation: loose  Approximation difficulty: simple  Dressing: splint  Patient tolerance: patient tolerated the procedure well with no immediate complications  Comments: Wound was dressed using Coban and splint was placed over it to be held in place with some additional Coban        MDM: Because this was a dog bite wound was not closed using sutures due to the concern of possible infection.  Wound was irrigated thoroughly and approximated using Steri-Strip and's splinted due to the fracture.  Antibiotics were called in for patient to start.  Patient was already up-to-date on her Tdap.  Referral for orthopedics was placed.    45 minutes spent with patient to review x-rays do history and physical and procedure.    Portions of the record may have been created with voice recognition software.  Occasional wrong word or \"sound a like\" substitutions may have occurred due to the inherent limitations of voice recognition software.  Read the chart carefully and recognize, using context, where substitutions have occurred.         [1]   Past Medical History:  Diagnosis Date    Abnormal Pap smear of cervix 2001    Cryosurgery    Alcohol " abuse 3/2008    coping mechanism    Allergic     Anxiety     Asthma     hx-infrequent as adult    Back pain     history of sports injuries    BRCA1 negative 2017    BRCA2 negative 2017    Bulimia nervosa 9/2001    treated    Depression     Eating disorder     bulimea- 20's in remission    Fibroid 2012    GERD (gastroesophageal reflux disease)     Panic attack 7/2019    PONV (postoperative nausea and vomiting)     avoids narcotics if able    Sleep difficulties 3/2020   [2]   Past Surgical History:  Procedure Laterality Date    BREAST BIOPSY Right 06/06/2023    BREAST LUMPECTOMY Right 08/04/2023    Procedure: LUMPECTOMY BREAST TONYA LOCALIZED;  Surgeon: Tova Childs MD;  Location: [facility];  Service: Surgical Oncology    DENTAL SURGERY  11/2000    wisdom teeth    EGD      GYNECOLOGIC CRYOSURGERY      US BREAST CLIP NEEDLE LOC RIGHT Right 07/14/2023    US BREAST NEEDLE LOC LEFT Left 05/09/2012    US GUIDED BREAST BIOPSY RIGHT COMPLETE Right 06/06/2023    WISDOM TOOTH EXTRACTION     [3]   Family History  Problem Relation Name Age of Onset    Irregular heart beat Mother          Proxysmal Atrial Tachycardia, PVC    Asthma Father Jong     Hyperlipidemia Father Jong     Hypertension Father Jong     Anxiety disorder Father Jong     Liver disease Father Jong     Diabetes Father Jong     Depression Father Jong     Alcohol abuse Father Jong     No Known Problems Sister      Uterine cancer Maternal Grandmother Mommom 68    Prostate cancer Maternal Grandfather Poppop 67    No Known Problems Paternal Grandmother      Emphysema Paternal Grandfather      Ovarian cancer Maternal Aunt Starla 67    Breast cancer Maternal Aunt Josefina 69        stage 4 metastatic breast cancer    Colon cancer Neg Hx     [4]   Allergies  Allergen Reactions    Bee Venom Rash     Cellulitis    Codeine      Other reaction(s): Nausea and/or vomiting    Oxycodone-Acetaminophen Vomiting

## 2025-06-03 ENCOUNTER — RESULTS FOLLOW-UP (OUTPATIENT)
Dept: URGENT CARE | Facility: MEDICAL CENTER | Age: 40
End: 2025-06-03

## 2025-06-03 NOTE — PATIENT INSTRUCTIONS
"  Patient Instructions    Take antibiotic as directed until completed  Motrin and or Tylenol as needed for pain  Follow-up with orthopedics  Wound dressing and wound care as directed  Follow up with PCP in 3-5 days.  Proceed to  ER if symptoms worsen.    If tests are performed, our office will contact you with results only if changes need to made to the care plan discussed with you at the visit. You can review your full results on [facility] MyChart.    Patient Education     Finger fracture   The Basics   Written by the doctors and editors at [facility]   What is a finger fracture? -- A \"fracture\" is another word for a broken bone. The finger bones are also called the \"phalanges\" (figure 1).  There are different types of fractures, depending on which bone breaks and how it breaks. When a bone breaks, it might crack, break all of the way through, or shatter.  Fractured fingers can happen if a finger is hit, twisted, or bent too far.  What are the symptoms of a finger fracture? -- Symptoms depend on which bone breaks and the kind of break it is. Common symptoms can include:   Pain, swelling, or bruising over the area   Finger looks bent in an abnormal position or is not the usual shape   Not being able to bend or move the finger   Trouble making a fist or grasping things with the fingers   Numbness in the area of the broken bone  Is there a test for a finger fracture? -- Yes. Your doctor or nurse will ask about your symptoms, do an exam, and do an X-ray. They might do other imaging tests, such as a CT, MRI, or ultrasound. Imaging tests create pictures of the inside of the body.  How are finger fractures treated? -- Treatment depends, in part, on the type of finger fracture you have and how severe it is. The goal of treatment is to have the ends of the broken bone line up with each other so that the bone can heal.  If the ends of the broken bone are already in line with each other, finger fractures are usually treated " "with a splint, \"shelbie taping,\" or both. Shelbie taping involves taping your injured finger to the finger next to it (picture 1).  If the ends of your broken bone are not in line with each other, the doctor will need to line them up:   Sometimes, the doctor can move the bone to the correct position without doing surgery, and then put a splint on or shelbie tape your fingers. This is called \"closed fracture reduction.\"   A severe finger fracture, in which a joint is damaged or the bones do not stay in position, is treated with surgery. During surgery, the doctor puts the finger bone back in position. To do this, they can use screws, pins, rods, or plates to fix the bones inside the finger. This is called \"open fracture reduction.\"  How long do finger fractures take to heal? -- Most finger fractures take weeks to months to heal, depending on the type of fracture. The doctor or nurse will talk to you about when to return to things like work, sports, or other activities.  Healing time also depends on the person. Healthy children usually heal much more quickly than older adults or adults with other medical problems.  How do I care for myself at home? -- To care for yourself or your child at home:   Follow the doctor's instructions for wearing the splint or shelbie taping the finger. This supports and protects the bone as it heals.   Follow instructions for limiting activity and movement until the bone is healed. The doctor or nurse will tell you what activities are safe to do.   Prop the injured hand on pillows, keeping it above the level of the heart. This might help lessen pain and swelling.   The doctor might recommend an over-the-counter pain medicine. These include acetaminophen (sample brand name: Tylenol), ibuprofen (sample brand names: Advil, Motrin), and naproxen (sample brand name: Aleve).   Some people get a prescription for stronger pain medicines to take for a short time. Follow the instructions for taking these " "medicines.   Ice can help with pain and swelling:   Put a cold gel pack, bag of ice, or bag of frozen vegetables on the injured area every 1 to 2 hours, for 15 minutes each time. Put a thin towel between the ice (or other cold object) and your skin.   Use the ice (or other cold object) for at least 6 hours after your injury. Some people find it helpful to ice longer, even up to 2 days after their injury.   Eat a healthy diet that includes plenty of calcium, vitamin D, and protein (figure 2).   If you smoke, try to quit. Broken bones take longer to heal if you smoke.   You might need to work with a physical therapist (exercise expert) after your fracture heals. The physical therapist will show you exercises and stretches to strengthen the hand and finger muscles and keep them from getting stiff.  When should I call the doctor? -- Call for advice if:   There is less feeling or movement in your fingers.   The finger becomes swollen or starts to hurt more.   The splint becomes too tight and uncomfortable.   The fingers are numb or tingly, or turn pale, blue, or gray.  All topics are updated as new evidence becomes available and our peer review process is complete.  This topic retrieved from [employer] on: Feb 26, 2024.  Topic 81709 Version 11.0  Release: 32.2.4 - C32.56  © 2024 UpToDate, Inc. and/or its affiliates. All rights reserved.  figure 1: Hand and wrist bones     This drawing shows the bones of the hand and wrist.  Graphic 91015 Version 2.0  picture 1: Finger shelbie taping     Finger shelbie taping involves taping an injured finger to the finger next to it.  Graphic 05259 Version 2.0  figure 2: Foods and drinks with calcium and vitamin D     Foods rich in calcium include ice cream, soy milk, breads, kale, broccoli, milk, cheese, cottage cheese, almonds, yogurt, ready-to-eat cereals, beans, and tofu. Foods rich in vitamin D include milk, fortified plant-based \"milks\" (soy, almond), canned tuna fish, cod liver oil, " yogurt, ready-to-eat-cereals, cooked salmon, canned sardines, mackerel, and eggs. Some of these foods are rich in both.  Graphic 18671 Version 4.0  Consumer Information Use and Disclaimer   Disclaimer: This generalized information is a limited summary of diagnosis, treatment, and/or medication information. It is not meant to be comprehensive and should be used as a tool to help the user understand and/or assess potential diagnostic and treatment options. It does NOT include all information about conditions, treatments, medications, side effects, or risks that may apply to a specific patient. It is not intended to be medical advice or a substitute for the medical advice, diagnosis, or treatment of a health care provider based on the health care provider's examination and assessment of a patient's specific and unique circumstances. Patients must speak with a health care provider for complete information about their health, medical questions, and treatment options, including any risks or benefits regarding use of medications. This information does not endorse any treatments or medications as safe, effective, or approved for treating a specific patient. UpToDate, Inc. and its affiliates disclaim any warranty or liability relating to this information or the use thereof.The use of this information is governed by the Terms of Use, available at https://www.woltersSunlight Foundationuwer.com/en/know/clinical-effectiveness-terms. 2024© UpToDate, Inc. and its affiliates and/or licensors. All rights reserved.  Copyright   © 2024 UpToDate, Inc. and/or its affiliates. All rights reserved.      Patient Education     Taking care of cuts, scrapes, and puncture wounds   The Basics   Written by the doctors and editors at [facility]   Does my cut need stitches? -- If your cut does not go all the way through the skin, it does not need stitches (figure 1). If your cut is wide, jagged, or does go all the way through the skin, you will most likely need  "stitches.  If you are not sure if your cut needs stitches, check with your doctor or nurse. Sometimes they will use special staples instead of stitches. Doctors can also use a special type of skin glue to close certain types of cuts.  This article is about caring for minor wounds (like small cuts and scrapes) that do not need to be closed with stitches, staples, or skin glue. If you got stitches, staples, or glue, your doctor or nurse will tell you how to care for yourself.  What if I have a puncture wound? -- A \"puncture wound\" is a type of cut that is made when a sharp object, like a nail, goes through the skin and into the tissue underneath. This type of wound can also be caused by animal or human bites. Puncture wounds are more likely than other minor wounds to get infected.  If you were bitten by an animal or human, see your doctor or nurse. Bite wounds need special care.  How do I take care of a minor wound on my own? -- To take care of your wound, follow these basic first aid guidelines:   Clean the wound - Wash it well with soap and water. If there is dirt, glass, or another object in your cut that you can't get out after you wash it, call your doctor or nurse.   Stop the bleeding - If your wound is bleeding, press a clean cloth or bandage firmly on the area for 20 minutes. You can also help slow the bleeding by holding the wound above the level of your heart, if possible. If the bleeding doesn't stop after 20 minutes, call your doctor or nurse.   Put a thin layer of antibiotic ointment on the wound   Cover the wound with a bandage or gauze. Keep the bandage clean and dry. Change the bandage 1 to 2 times every day until your wound heals.   Do not swim or soak your wound in water until it has healed. Ask your doctor or nurse if you have any questions.   Each time you change the bandage, look at your skin to check for signs of infection. These include redness that is getting worse or spreading, swelling, or " "warmth in the area. You might see some thin clear or yellow fluid as the wound heals, which is normal.  Most minor wounds heal on their own within 7 to 10 days. As your wound heals, a scab will form. Do not pick at the scab or scratch the skin around it.  When should I call the doctor or nurse? -- Call the doctor or nurse if you have any signs of an infection. Signs of an infection include:   Fever   Redness, swelling, warmth, or increased pain around the wound   A bad smell coming from the wound   Pus (thick yellow, green, or gray fluid) draining from the wound   Red streaks on the skin around the wound, or red streaks going up your arm or leg  Will I need a tetanus shot? -- Maybe. It depends on how old you are and when your last tetanus shot was. Tetanus is a serious infection that can cause muscle stiffness and spasms, and even lead to death. It is caused by bacteria (germs) that live in the dirt.  Most children get a tetanus vaccine as part of their routine check-ups. Vaccines can prevent certain serious or deadly infections. Many adults also get a tetanus vaccine as part of their routine check-ups. Getting all your vaccines is important, since it's possible to get tetanus even from a small wound.  If your skin is cut or punctured, and especially if the cut is dirty or deep, ask your doctor or nurse if you need a tetanus shot.  All topics are updated as new evidence becomes available and our peer review process is complete.  This topic retrieved from [employer] on: Mar 20, 2024.  Topic 14564 Version 11.0  Release: 32.2.4 - C32.78  © 2024 UpToDate, Inc. and/or its affiliates. All rights reserved.  figure 1: Minor cuts and scrapes     Picture A shows a scrape (also called an \"abrasion\"). The scrape doesn't go all the way through the skin, so it does not need stitches. Picture B shows a cut that does go all the way through the skin. This cut is deep, so it needs stitches.  Graphic 90849 Version 4.0  Consumer " Information Use and Disclaimer   Disclaimer: This generalized information is a limited summary of diagnosis, treatment, and/or medication information. It is not meant to be comprehensive and should be used as a tool to help the user understand and/or assess potential diagnostic and treatment options. It does NOT include all information about conditions, treatments, medications, side effects, or risks that may apply to a specific patient. It is not intended to be medical advice or a substitute for the medical advice, diagnosis, or treatment of a health care provider based on the health care provider's examination and assessment of a patient's specific and unique circumstances. Patients must speak with a health care provider for complete information about their health, medical questions, and treatment options, including any risks or benefits regarding use of medications. This information does not endorse any treatments or medications as safe, effective, or approved for treating a specific patient. UpToDate, Inc. and its affiliates disclaim any warranty or liability relating to this information or the use thereof.The use of this information is governed by the Terms of Use, available at https://www.righTune.com/en/know/clinical-effectiveness-terms. 2024© UpToDate, Inc. and its affiliates and/or licensors. All rights reserved.  Copyright   © 2024 UpToDate, Inc. and/or its affiliates. All rights reserved.      Patient Education     Animal Bites ED   General Information   You came to the Emergency Department (ED) after an animal bite. The bite may have scraped, torn, or punctured your skin. Depending on how serious the bite is, you may need stitches. You may need to take antibiotics to treat or prevent infections. The doctors gave you a rabies vaccine if the animal bite could have exposed you to rabies. You will need to get more shots of the vaccine to keep you from getting rabies. Most of the time, you can care for  your wounds at home. How long it will take to heal is based on how serious the wound is.  What care is needed at home?   Call your regular doctor to let them know you were in the ED. Make a follow-up appointment if you were told to.  If you got a prescription for antibiotics, be sure to take all of the medicine as prescribed.  The doctor may want you to keep your wound covered as it heals. You may want to use a thin layer of antibiotic ointment to help keep the wound moist. This will also keep the dressing from sticking to the wound.  After a day or two, you can gently wash the wound with soap and water. Pat dry and put on a clean dressing. If you got stitches, the doctor will tell you how to care for them.  Change your dressing once a day or every other day.  Always wash your hands before and after touching the wound.  Each time you change the dressing, look closely at the wound to be sure it is healing the right way. The wound may have a thin, yellowish discharge, and this is normal.  Avoid picking the scab or scratching the site which may cause more irritation.  Do not soak in water or swim with an open wound.  If you have stitches, do not take them out yourself. Your stitches need to be removed on __________________________.  When do I need to call the doctor?   You have a fever of 100.4°F (38°C) or higher or chills.  Your wound is swollen, red, or warm.  Your wound has thick yellow or green drainage.  The wound opens up.  You have more pain at the bite after the first 2 days.  You have new or worsening symptoms.  Last Reviewed Date   2021-08-18  Consumer Information Use and Disclaimer   This generalized information is a limited summary of diagnosis, treatment, and/or medication information. It is not meant to be comprehensive and should be used as a tool to help the user understand and/or assess potential diagnostic and treatment options. It does NOT include all information about conditions, treatments,  medications, side effects, or risks that may apply to a specific patient. It is not intended to be medical advice or a substitute for the medical advice, diagnosis, or treatment of a health care provider based on the health care provider's examination and assessment of a patient’s specific and unique circumstances. Patients must speak with a health care provider for complete information about their health, medical questions, and treatment options, including any risks or benefits regarding use of medications. This information does not endorse any treatments or medications as safe, effective, or approved for treating a specific patient. UpToDate, Inc. and its affiliates disclaim any warranty or liability relating to this information or the use thereof. The use of this information is governed by the Terms of Use, available at https://www.woltersU.S. Nursing Corporationuwer.com/en/know/clinical-effectiveness-terms   Copyright   Copyright © 2024 UpToDate, Inc. and its affiliates and/or licensors. All rights reserved.

## 2025-06-04 DIAGNOSIS — R00.2 HEART PALPITATIONS: ICD-10-CM

## 2025-06-05 ENCOUNTER — HOSPITAL ENCOUNTER (OUTPATIENT)
Dept: ULTRASOUND IMAGING | Facility: CLINIC | Age: 40
Discharge: HOME | End: 2025-06-05
Payer: COMMERCIAL

## 2025-06-05 ENCOUNTER — HOSPITAL ENCOUNTER (OUTPATIENT)
Dept: MAMMOGRAPHY | Facility: CLINIC | Age: 40
Discharge: HOME | End: 2025-06-05
Payer: COMMERCIAL

## 2025-06-05 VITALS — BODY MASS INDEX: 26.37 KG/M2 | WEIGHT: 174 LBS | HEIGHT: 68 IN

## 2025-06-05 DIAGNOSIS — R92.8 ABNORMAL FINDINGS ON DIAGNOSTIC IMAGING OF BREAST: ICD-10-CM

## 2025-06-05 PROCEDURE — 76642 ULTRASOUND BREAST LIMITED: CPT

## 2025-06-05 PROCEDURE — 77066 DX MAMMO INCL CAD BI: CPT

## 2025-06-05 PROCEDURE — G0279 TOMOSYNTHESIS, MAMMO: HCPCS

## 2025-06-06 RX ORDER — METOPROLOL SUCCINATE 50 MG/1
50 TABLET, EXTENDED RELEASE ORAL DAILY
Qty: 90 TABLET | Refills: 1 | Status: SHIPPED | OUTPATIENT
Start: 2025-06-06

## 2025-06-13 ENCOUNTER — APPOINTMENT (OUTPATIENT)
Dept: LAB | Facility: HOSPITAL | Age: 40
End: 2025-06-13
Attending: PREVENTIVE MEDICINE
Payer: COMMERCIAL

## 2025-06-13 ENCOUNTER — RESULTS FOLLOW-UP (OUTPATIENT)
Dept: FAMILY MEDICINE CLINIC | Facility: CLINIC | Age: 40
End: 2025-06-13

## 2025-06-13 ENCOUNTER — APPOINTMENT (OUTPATIENT)
Dept: LAB | Facility: HOSPITAL | Age: 40
End: 2025-06-13
Attending: PHYSICIAN ASSISTANT
Payer: COMMERCIAL

## 2025-06-13 ENCOUNTER — TRANSCRIBE ORDERS (OUTPATIENT)
Dept: LAB | Facility: HOSPITAL | Age: 40
End: 2025-06-13

## 2025-06-13 ENCOUNTER — TELEPHONE (OUTPATIENT)
Age: 40
End: 2025-06-13

## 2025-06-13 DIAGNOSIS — Z00.8 HEALTH EXAMINATION IN POPULATION SURVEYS: Primary | ICD-10-CM

## 2025-06-13 DIAGNOSIS — Z00.8 HEALTH EXAMINATION IN POPULATION SURVEYS: ICD-10-CM

## 2025-06-13 DIAGNOSIS — E55.9 VITAMIN D DEFICIENCY: ICD-10-CM

## 2025-06-13 LAB
25(OH)D3 SERPL-MCNC: 20.3 NG/ML (ref 30–100)
CHOLEST SERPL-MCNC: 171 MG/DL (ref ?–200)
EST. AVERAGE GLUCOSE BLD GHB EST-MCNC: 111 MG/DL
HBA1C MFR BLD: 5.5 %
HDLC SERPL-MCNC: 74 MG/DL
LDLC SERPL CALC-MCNC: 60 MG/DL (ref 0–100)
NONHDLC SERPL-MCNC: 97 MG/DL
TRIGL SERPL-MCNC: 183 MG/DL (ref ?–150)
TSH SERPL DL<=0.05 MIU/L-ACNC: 1.32 UIU/ML (ref 0.45–4.5)

## 2025-06-13 PROCEDURE — 36415 COLL VENOUS BLD VENIPUNCTURE: CPT

## 2025-06-13 PROCEDURE — 82306 VITAMIN D 25 HYDROXY: CPT

## 2025-06-13 PROCEDURE — 80061 LIPID PANEL: CPT

## 2025-06-13 PROCEDURE — 83036 HEMOGLOBIN GLYCOSYLATED A1C: CPT

## 2025-06-13 NOTE — TELEPHONE ENCOUNTER
Patient contacted the office to schedule a follow up visit with provider. Patient is now scheduled for 6/23/25  at 10a virtually.

## 2025-06-23 ENCOUNTER — HOSPITAL ENCOUNTER (OUTPATIENT)
Dept: RADIOLOGY | Facility: HOSPITAL | Age: 40
Discharge: HOME | End: 2025-06-23
Attending: SURGERY
Payer: COMMERCIAL

## 2025-06-23 ENCOUNTER — TELEMEDICINE (OUTPATIENT)
Dept: BEHAVIORAL/MENTAL HEALTH CLINIC | Facility: CLINIC | Age: 40
End: 2025-06-23
Payer: COMMERCIAL

## 2025-06-23 ENCOUNTER — OFFICE VISIT (OUTPATIENT)
Dept: OBGYN CLINIC | Facility: HOSPITAL | Age: 40
End: 2025-06-23
Payer: COMMERCIAL

## 2025-06-23 VITALS — WEIGHT: 174 LBS | BODY MASS INDEX: 26.37 KG/M2 | HEIGHT: 68 IN

## 2025-06-23 DIAGNOSIS — S62.665B OPEN NONDISPLACED FRACTURE OF DISTAL PHALANX OF LEFT RING FINGER, INITIAL ENCOUNTER: ICD-10-CM

## 2025-06-23 DIAGNOSIS — F32.9 MAJOR DEPRESSIVE DISORDER WITH CURRENT ACTIVE EPISODE, UNSPECIFIED DEPRESSION EPISODE SEVERITY, UNSPECIFIED WHETHER RECURRENT: ICD-10-CM

## 2025-06-23 DIAGNOSIS — S62.665B OPEN NONDISPLACED FRACTURE OF DISTAL PHALANX OF LEFT RING FINGER, INITIAL ENCOUNTER: Primary | ICD-10-CM

## 2025-06-23 DIAGNOSIS — F41.1 GENERALIZED ANXIETY DISORDER: Primary | ICD-10-CM

## 2025-06-23 PROCEDURE — 73140 X-RAY EXAM OF FINGER(S): CPT

## 2025-06-23 PROCEDURE — 99243 OFF/OP CNSLTJ NEW/EST LOW 30: CPT | Performed by: SURGERY

## 2025-06-23 PROCEDURE — 90837 PSYTX W PT 60 MINUTES: CPT | Performed by: SOCIAL WORKER
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
## 2025-06-23 NOTE — PROGRESS NOTES
Jennifer Kincaid M.D.  Attending, Orthopaedic Surgery  Hand, Wrist, and Elbow Surgery  [facility]      ORTHOPAEDIC HAND, WRIST, AND ELBOW OFFICE  VISIT       ASSESSMENT/PLAN:        Assessment & Plan  Open nondisplaced fracture of distal phalanx of left ring finger, initial encounter  New x-rays of the left ring finger were obtained today and reviewed with the patient  On exam patient has some mild redness on the distal phalanx and middle phalanx with no pain with a healing lack.  Patient does have some stiffness at the DIP joint.  Instructed the patient to perform place and holds with the DIP joint to ensure that she can make a full composite fist.  Informed the patient that if the redness increases associated with pain to come back in sooner.   We will follow up in 3 weeks with xrays  Orders:    Ambulatory referral to Orthopedic Surgery    XR finger left fourth digit-ring; Future      The patient verbalized understanding of exam findings and treatment plan. We engaged in the shared decision-making process and treatment options were discussed at length with the patient. Surgical and conservative management discussed today along with risks and benefits.    Diagnoses and all orders for this visit:    Open nondisplaced fracture of distal phalanx of left ring finger, initial encounter  -     Ambulatory referral to Orthopedic Surgery  -     XR finger left fourth digit-ring; Future        Follow Up:  Return in about 3 weeks (around 7/14/2025) for left ring finger.    To Do Next Visit:  Re-evaluation of current issue      General Discussions:  Fracture - Nonoperative Care: The physiology of a fractured bone was discussed with the patient today.  With non-displaced or minimally displaced fractures, conservative treatment such as casting or splinting often results in a functional recovery.  Typically, these fractures are immobilized in either a cast or splint depending on the pattern.  Radiographs  are typically taken at intervals throughout the fracture healing to ensure that reduction or alignment is not lost.  If the fracture loses its alignment, surgical intervention may be required to stabilize it.  Medical conditions such as diabetes, osteoporosis, vitamin D deficiency, and a history of or exposure to smoking may delay or prevent fracture healing. Options between cast/splint immobilization and surgical treatment were offered and the risks and benefits of both were discussed.     ____________________________________________________________________________________________________________________________________________      CHIEF COMPLAINT:  Chief Complaint   Patient presents with    Left Ring Finger - Fracture, Pain     Patient broke up a fight between her dogs having issues with ROM       SUBJECTIVE:  Candi Marshall is a 40 y.o. year old RHD female who presents today for consultation for a dog bite to the left ring finger occurred 6/2/2025.  Patient broke up her 2 dogs that were fighting over her food. She was placed on Augmentin for 7 days. She had 2 sutures placed that are now out.   She has no pain just stiffness at the DIP joint.       Pain/symptom timing:  Worse during the day when active  Pain/symptom context:  Worse with activites and work  Pain/symptom modifying factors:  Rest makes better, activities make worse  Pain/symptom associated signs/symptoms: none    Prior treatment   NSAIDsNo   Injections No   Bracing/Orthotics No    Physical Therapy No     I have personally reviewed all the relevant PMH, PSH, SH, FH, Medications and allergies      PAST MEDICAL HISTORY:  Past Medical History[1]    PAST SURGICAL HISTORY:  Past Surgical History[2]    FAMILY HISTORY:  Family History[3]    SOCIAL HISTORY:  Social History[4]    MEDICATIONS:  Current Medications[5]    ALLERGIES:  Allergies[6]        REVIEW OF SYSTEMS:  Review of Systems   Constitutional:  Negative for chills, fever and unexpected  weight change.   HENT:  Negative for hearing loss, nosebleeds and sore throat.    Eyes:  Negative for pain, redness and visual disturbance.   Respiratory:  Negative for cough, shortness of breath and wheezing.    Cardiovascular:  Negative for chest pain, palpitations and leg swelling.   Gastrointestinal:  Negative for abdominal pain and nausea.   Genitourinary:  Negative for dyspareunia, dysuria and frequency.   Skin:  Negative for rash and wound.   Neurological:  Negative for dizziness, numbness and headaches.   Psychiatric/Behavioral:  Negative for decreased concentration and suicidal ideas. The patient is not nervous/anxious.        VITALS:  There were no vitals filed for this visit.    LABS:      _____________________________________________________  PHYSICAL EXAMINATION:  General: well developed and well nourished, alert, oriented times 3, and appears comfortable  Psychiatric: Normal  HEENT: Normocephalic, Atraumatic Trachea Midline, No torticollis  Pulmonary: No audible wheezing or respiratory distress   Abdomen/GI: Non tender, non distended   Cardiovascular: No pitting edema, 2+ radial pulse   Skin: No masses, erythema, lacerations, fluctation, ulcerations  Neurovascular: Sensation Intact to the Median, Ulnar, Radial Nerve, Motor Intact to the Median, Ulnar, Radial Nerve, and Pulses Intact  Musculoskeletal: Normal, except as noted in detailed exam and in HPI.      MUSCULOSKELETAL EXAMINATION:    Left ring finger:   Redness noted over the distal phalanx and middle phalanx  No pain associated with it on palpation  Healed laceration that is distal to the DIP joint  Full Motion at the Mp, PIP and decreased at DIP  Opposition intact to light touch distally  Brisk capillary     ___________________________________________________  STUDIES REVIEWED:  I have personally reviewed and interpreted  AP lateral and oblique radiographs of xrays left ring finger 2 views  which demonstrate distal phalanx fx, stable      LABS  REVIEWED:    HgA1c:   Lab Results   Component Value Date    HGBA1C 5.5 06/13/2025     BMP:   Lab Results   Component Value Date    CALCIUM 8.6 05/14/2025    K 4.0 05/14/2025    CO2 22 05/14/2025    CL 99 05/14/2025    BUN 17 05/14/2025    CREATININE 0.66 05/14/2025           PROCEDURES PERFORMED:  Procedures  No Procedures performed today    _____________________________________________________      Scribe Attestation      I,:  Darya Doe am acting as a scribe while in the presence of the attending physician.:       I,:  Jennifer Kincaid MD personally performed the services described in this documentation    as scribed in my presence.:                           [1]   Past Medical History:  Diagnosis Date    Abnormal Pap smear of cervix 2001    Cryosurgery    Alcohol abuse 3/2008    coping mechanism    Allergic     Anxiety     Asthma     hx-infrequent as adult    Back pain     history of sports injuries    BRCA1 negative 2017    BRCA2 negative 2017    Bulimia nervosa 9/2001    treated    Depression     Eating disorder     bulimea- 20's in remission    Fibroid 2012    GERD (gastroesophageal reflux disease)     Panic attack 7/2019    PONV (postoperative nausea and vomiting)     avoids narcotics if able    Sleep difficulties 3/2020   [2]   Past Surgical History:  Procedure Laterality Date    BREAST BIOPSY Right 06/06/2023    BREAST LUMPECTOMY Right 08/04/2023    Procedure: LUMPECTOMY BREAST TONYA LOCALIZED;  Surgeon: Tova Childs MD;  Location: [facility];  Service: Surgical Oncology    DENTAL SURGERY  11/2000    wisdom teeth    EGD      GYNECOLOGIC CRYOSURGERY      US BREAST CLIP NEEDLE LOC RIGHT Right 07/14/2023    US BREAST NEEDLE LOC LEFT Left 05/09/2012    US GUIDED BREAST BIOPSY RIGHT COMPLETE Right 06/06/2023    WISDOM TOOTH EXTRACTION     [3]   Family History  Problem Relation Name Age of Onset    Irregular heart beat Mother          Proxysmal Atrial Tachycardia, PVC    Asthma Father Jong      Hyperlipidemia Father Jack     Hypertension Father Jack     Anxiety disorder Father Jack     Liver disease Father Jong     Diabetes Father Jack     Depression Father Jack     Alcohol abuse Father Jack     No Known Problems Sister      Uterine cancer Maternal Grandmother Mommom 68    Prostate cancer Maternal Grandfather Poppop 67    No Known Problems Paternal Grandmother      Emphysema Paternal Grandfather      Ovarian cancer Maternal Aunt Starla 67    Breast cancer Maternal Aunt Josefina 69        stage 4 metastatic breast cancer    Colon cancer Neg Hx     [4]   Social History  Tobacco Use    Smoking status: Former     Current packs/day: 0.00     Types: Cigarettes     Quit date:      Years since quittin.4     Passive exposure: Past    Smokeless tobacco: Never   Vaping Use    Vaping status: Never Used   Substance Use Topics    Alcohol use: Yes     Alcohol/week: 7.0 standard drinks of alcohol     Types: 7 Glasses of wine per week     Comment: social    Drug use: No   [5]   Current Outpatient Medications:     albuterol (PROVENTIL HFA,VENTOLIN HFA) 90 mcg/act inhaler, Inhale 2 puffs every 4 (four) hours as needed for wheezing or shortness of breath, Disp: 18 g, Rfl: 11    azelastine (ASTELIN) 0.1 % nasal spray, 2 sprays into each nostril as needed, Disp: , Rfl:     escitalopram (LEXAPRO) 20 mg tablet, Take 1 tablet (20 mg total) by mouth daily, Disp: 90 tablet, Rfl: 1    famotidine (PEPCID) 20 mg tablet, Take 1 tablet (20 mg total) by mouth daily, Disp: 90 tablet, Rfl: 3    hydrocortisone (ANUSOL-HC) 2.5 % rectal cream, Apply topically 2 (two) times a day, Disp: 28 g, Rfl: 0    Lidocaine, Anorectal, 5 % CREA, Apply topically 3 (three) times a day as needed (pain), Disp: 113 g, Rfl: 0    metoprolol succinate (TOPROL-XL) 50 mg 24 hr tablet, TAKE ONE TABLET BY MOUTH EVERY DAY, Disp: 90 tablet, Rfl: 1    multivitamin (THERAGRAN) TABS, Take 1 tablet by mouth in the morning., Disp: , Rfl:     omeprazole (PriLOSEC) 20 mg  delayed release capsule, Take 1 capsule (20 mg total) by mouth daily, Disp: 90 capsule, Rfl: 3    ondansetron (ZOFRAN) 4 mg tablet, Take 1 tablet (4 mg total) by mouth every 6 (six) hours, Disp: 20 tablet, Rfl: 0    cetirizine (ZyrTEC) 10 mg tablet, Take 1 tablet (10 mg total) by mouth daily (Patient taking differently: Take 10 mg by mouth as needed), Disp: 90 tablet, Rfl: 3    methocarbamol (ROBAXIN) 500 mg tablet, Take 1 tablet (500 mg total) by mouth 2 (two) times a day, Disp: 20 tablet, Rfl: 0    naproxen (NAPROSYN) 250 mg tablet, Take 1 tablet (250 mg total) by mouth 3 (three) times a day with meals for 7 days, Disp: 21 tablet, Rfl: 0  [6]   Allergies  Allergen Reactions    Bee Venom Rash     Cellulitis    Codeine      Other reaction(s): Nausea and/or vomiting    Oxycodone-Acetaminophen Vomiting

## 2025-06-29 NOTE — PROGRESS NOTES
A/P     Dysmenorrhea    Pelvic pain has gotten so bad that it makes it impossible to do anything     2.  Family history of cancer   ovarian cancer -Maternal aunt  Breast cancer - another maternal aunt  Uterine - maternal grandmother -     She very much desires a hysterectomy and salpingectomy  We went over the procedure and the risk of the surgery     She is aware that will reduce her risk but should still get all scheduled exams    All questions asked and answered   Consent signed.     Will return for pre op         Had sonogram     IMPRESSION:     1.  Hypoechoic focus within the posterior uterine fundus measuring 1.2 cm which is most compatible with an intramural fibroid.  2.  Left ovarian cystic structure measuring 1.7 x 1.6 x 1.4 cm with slightly thickened septation. This is favored to represent 2 abutting follicles with intervening parenchyma, however, bilocular septated cyst may demonstrate a similar appearance.   Follow-up ultrasound is recommended in 10 to 12 weeks to ensure resolution.  3.  Thick-walled cyst within the left ovary measuring 2.0 cm which is most compatible with a corpus luteum.

## 2025-07-02 ENCOUNTER — CONSULT (OUTPATIENT)
Dept: OBGYN CLINIC | Facility: MEDICAL CENTER | Age: 40
End: 2025-07-02
Payer: COMMERCIAL

## 2025-07-02 VITALS
DIASTOLIC BLOOD PRESSURE: 70 MMHG | BODY MASS INDEX: 26.67 KG/M2 | WEIGHT: 176 LBS | HEIGHT: 68 IN | SYSTOLIC BLOOD PRESSURE: 120 MMHG

## 2025-07-02 DIAGNOSIS — N94.6 DYSMENORRHEA: ICD-10-CM

## 2025-07-02 DIAGNOSIS — N83.202 LEFT OVARIAN CYST: Primary | ICD-10-CM

## 2025-07-02 PROCEDURE — 99214 OFFICE O/P EST MOD 30 MIN: CPT | Performed by: OBSTETRICS & GYNECOLOGY

## 2025-07-02 RX ORDER — OMEGA-3S/DHA/EPA/FISH OIL/D3 300MG-1000
400 CAPSULE ORAL DAILY
COMMUNITY

## 2025-07-15 ENCOUNTER — TELEPHONE (OUTPATIENT)
Dept: OBGYN CLINIC | Facility: MEDICAL CENTER | Age: 40
End: 2025-07-15

## (undated) DEVICE — ADHESIVE SKIN HIGH VISCOSITY EXOFIN 1ML

## (undated) DEVICE — GLOVE INDICATOR PI UNDERGLOVE SZ 8 BLUE

## (undated) DEVICE — SHEATH, GUIDE, SAVI SCOUT®: Brand: SAVI SCOUT®

## (undated) DEVICE — [place] UNIVERSAL MINOR GEN: Brand: CARDINAL HEALTH

## (undated) DEVICE — SUPER SPONGES,MEDIUM: Brand: KERLIX

## (undated) DEVICE — SUT MONOCRYL 4-0 PS-2 27 IN Y426H

## (undated) DEVICE — SCD SEQUENTIAL COMPRESSION COMFORT SLEEVE MEDIUM KNEE LENGTH: Brand: KENDALL SCD

## (undated) DEVICE — SUT SILK 2-0 SH 30 IN K833H

## (undated) DEVICE — NEEDLE 25G X 1 1/2

## (undated) DEVICE — GLOVE INDICATOR PI UNDERGLOVE SZ 6.5 BLUE

## (undated) DEVICE — DRAPE EQUIPMENT RF WAND

## (undated) DEVICE — GLOVE INDICATOR PI UNDERGLOVE SZ 7.5 BLUE

## (undated) DEVICE — MEDI-VAC YANKAUER SUCTION HANDLE W/BULBOUS AND CONTROL VENT: Brand: CARDINAL HEALTH

## (undated) DEVICE — GLOVE SRG BIOGEL 6

## (undated) DEVICE — PLUMEPEN PRO 10FT

## (undated) DEVICE — WET SKIN PREP TRAY: Brand: MEDLINE INDUSTRIES, INC.

## (undated) DEVICE — GLOVE SRG BIOGEL 8

## (undated) DEVICE — 2000CC GUARDIAN II: Brand: GUARDIAN

## (undated) DEVICE — GLOVE SRG BIOGEL 7

## (undated) DEVICE — HANDPIECE, SINGLE-USE, SAVI SCOUT®: Brand: SAVI SCOUT®

## (undated) DEVICE — TUBING SUCTION 5MM X 12 FT

## (undated) DEVICE — SUT MONOCRYL 3-0 SH 27 IN Y416H

## (undated) DEVICE — DRAPE SHEET THREE QUARTER

## (undated) DEVICE — GLOVE SRG BIOGEL 6.5